# Patient Record
Sex: MALE | Race: WHITE | NOT HISPANIC OR LATINO | Employment: OTHER | URBAN - NONMETROPOLITAN AREA
[De-identification: names, ages, dates, MRNs, and addresses within clinical notes are randomized per-mention and may not be internally consistent; named-entity substitution may affect disease eponyms.]

---

## 2017-06-11 ENCOUNTER — HOSPITAL ENCOUNTER (EMERGENCY)
Facility: HOSPITAL | Age: 62
Discharge: HOME | End: 2017-06-11
Attending: NURSE PRACTITIONER | Admitting: NURSE PRACTITIONER
Payer: MEDICARE

## 2017-06-11 VITALS
SYSTOLIC BLOOD PRESSURE: 110 MMHG | BODY MASS INDEX: 28.48 KG/M2 | OXYGEN SATURATION: 96 % | WEIGHT: 210 LBS | TEMPERATURE: 97.5 F | DIASTOLIC BLOOD PRESSURE: 64 MMHG

## 2017-06-11 DIAGNOSIS — M54.41 ACUTE RIGHT-SIDED LOW BACK PAIN WITH RIGHT-SIDED SCIATICA: ICD-10-CM

## 2017-06-11 LAB
ALBUMIN SERPL-MCNC: 3.7 G/DL (ref 3.4–5)
ALP SERPL-CCNC: 90 U/L (ref 40–150)
ALT SERPL W P-5'-P-CCNC: 21 U/L (ref 0–70)
ANION GAP SERPL CALCULATED.3IONS-SCNC: 6 MMOL/L (ref 3–14)
AST SERPL W P-5'-P-CCNC: 16 U/L (ref 0–45)
BASOPHILS # BLD AUTO: 0.1 10E9/L (ref 0–0.2)
BASOPHILS NFR BLD AUTO: 0.7 %
BILIRUB SERPL-MCNC: 0.4 MG/DL (ref 0.2–1.3)
BUN SERPL-MCNC: 15 MG/DL (ref 7–30)
CALCIUM SERPL-MCNC: 9.3 MG/DL (ref 8.5–10.1)
CHLORIDE SERPL-SCNC: 107 MMOL/L (ref 94–109)
CO2 SERPL-SCNC: 29 MMOL/L (ref 20–32)
CREAT SERPL-MCNC: 0.85 MG/DL (ref 0.66–1.25)
CRP SERPL-MCNC: 5.4 MG/L (ref 0–8)
DIFFERENTIAL METHOD BLD: ABNORMAL
EOSINOPHIL # BLD AUTO: 0.2 10E9/L (ref 0–0.7)
EOSINOPHIL NFR BLD AUTO: 1.5 %
ERYTHROCYTE [DISTWIDTH] IN BLOOD BY AUTOMATED COUNT: 14.4 % (ref 10–15)
GFR SERPL CREATININE-BSD FRML MDRD: ABNORMAL ML/MIN/1.7M2
GLUCOSE SERPL-MCNC: 133 MG/DL (ref 70–99)
HCT VFR BLD AUTO: 45 % (ref 40–53)
HGB BLD-MCNC: 15.6 G/DL (ref 13.3–17.7)
IMM GRANULOCYTES # BLD: 0.1 10E9/L (ref 0–0.4)
IMM GRANULOCYTES NFR BLD: 0.6 %
LYMPHOCYTES # BLD AUTO: 2.9 10E9/L (ref 0.8–5.3)
LYMPHOCYTES NFR BLD AUTO: 29.3 %
MCH RBC QN AUTO: 33.9 PG (ref 26.5–33)
MCHC RBC AUTO-ENTMCNC: 34.7 G/DL (ref 31.5–36.5)
MCV RBC AUTO: 98 FL (ref 78–100)
MONOCYTES # BLD AUTO: 0.8 10E9/L (ref 0–1.3)
MONOCYTES NFR BLD AUTO: 7.7 %
NEUTROPHILS # BLD AUTO: 5.9 10E9/L (ref 1.6–8.3)
NEUTROPHILS NFR BLD AUTO: 60.2 %
NRBC # BLD AUTO: 0 10*3/UL
NRBC BLD AUTO-RTO: 0 /100
PLATELET # BLD AUTO: 192 10E9/L (ref 150–450)
POTASSIUM SERPL-SCNC: 4.4 MMOL/L (ref 3.4–5.3)
PROT SERPL-MCNC: 7.2 G/DL (ref 6.8–8.8)
RBC # BLD AUTO: 4.6 10E12/L (ref 4.4–5.9)
SODIUM SERPL-SCNC: 142 MMOL/L (ref 133–144)
WBC # BLD AUTO: 9.8 10E9/L (ref 4–11)

## 2017-06-11 PROCEDURE — 36415 COLL VENOUS BLD VENIPUNCTURE: CPT | Performed by: NURSE PRACTITIONER

## 2017-06-11 PROCEDURE — 85025 COMPLETE CBC W/AUTO DIFF WBC: CPT | Performed by: NURSE PRACTITIONER

## 2017-06-11 PROCEDURE — 72100 X-RAY EXAM L-S SPINE 2/3 VWS: CPT | Mod: TC

## 2017-06-11 PROCEDURE — 99213 OFFICE O/P EST LOW 20 MIN: CPT | Performed by: NURSE PRACTITIONER

## 2017-06-11 PROCEDURE — 86140 C-REACTIVE PROTEIN: CPT | Performed by: NURSE PRACTITIONER

## 2017-06-11 PROCEDURE — 25000128 H RX IP 250 OP 636: Performed by: NURSE PRACTITIONER

## 2017-06-11 PROCEDURE — 80053 COMPREHEN METABOLIC PANEL: CPT | Performed by: NURSE PRACTITIONER

## 2017-06-11 PROCEDURE — 96372 THER/PROPH/DIAG INJ SC/IM: CPT

## 2017-06-11 PROCEDURE — 99214 OFFICE O/P EST MOD 30 MIN: CPT | Mod: 25

## 2017-06-11 PROCEDURE — 25000131 ZZH RX MED GY IP 250 OP 636 PS 637: Performed by: NURSE PRACTITIONER

## 2017-06-11 RX ORDER — KETOROLAC TROMETHAMINE 30 MG/ML
60 INJECTION, SOLUTION INTRAMUSCULAR; INTRAVENOUS ONCE
Status: COMPLETED | OUTPATIENT
Start: 2017-06-11 | End: 2017-06-11

## 2017-06-11 RX ORDER — NAPROXEN 500 MG/1
500 TABLET ORAL 2 TIMES DAILY WITH MEALS
Qty: 30 TABLET | Refills: 0 | Status: ON HOLD | OUTPATIENT
Start: 2017-06-11 | End: 2020-12-14

## 2017-06-11 RX ORDER — DEXAMETHASONE SODIUM PHOSPHATE 10 MG/ML
10 INJECTION, SOLUTION INTRAMUSCULAR; INTRAVENOUS ONCE
Status: COMPLETED | OUTPATIENT
Start: 2017-06-11 | End: 2017-06-11

## 2017-06-11 RX ORDER — CYCLOBENZAPRINE HCL 10 MG
10 TABLET ORAL 3 TIMES DAILY PRN
Qty: 20 TABLET | Refills: 0 | Status: SHIPPED | OUTPATIENT
Start: 2017-06-11 | End: 2017-06-17

## 2017-06-11 RX ADMIN — DEXAMETHASONE SODIUM PHOSPHATE 10 MG: 10 INJECTION, SOLUTION INTRAMUSCULAR; INTRAVENOUS at 14:16

## 2017-06-11 RX ADMIN — KETOROLAC TROMETHAMINE 60 MG: 30 INJECTION, SOLUTION INTRAMUSCULAR at 13:20

## 2017-06-11 ASSESSMENT — ENCOUNTER SYMPTOMS
VOMITING: 0
FEVER: 0
FATIGUE: 0
CHILLS: 0
NECK STIFFNESS: 0
NECK PAIN: 0
NUMBNESS: 1
CONSTIPATION: 0
BACK PAIN: 1
ABDOMINAL PAIN: 0
NAUSEA: 0
DYSURIA: 0
APPETITE CHANGE: 0
DIARRHEA: 0
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
## 2017-06-11 NOTE — DISCHARGE INSTRUCTIONS
Back Care Tips    Caring for your back  These are things you can do to prevent a recurrence of acute back pain and to reduce symptoms from chronic back pain:    Maintain a healthy weight. If you are overweight, losing weight will help most types of back pain.    Exercise is an important part of recovery from most types of back pain. The back is supported by the muscles behind and in front of the spine. This means both the back muscles and the abdominal muscles must be strengthened to provide better support for your spine.     Swimming and brisk walking are good overall exercises to improve your fitness level.    Practice safe lifting methods (below).    Practice good posture when sitting, standing and walking. Avoid prolonged sitting. This puts more stress on the lower back than standing or walking.    Wear quality shoes with sufficient arch support. Foot and ankle alignment can affect back symptoms. Women should avoid high heels.    Therapeutic massage can help  relax the back muscles without stretching them.    During the first 24 to 72 hours after an acute injury or flare-up of chronic back pain, apply an ice pack to the painful area for 20 minutes and then remove it for 20 minutes over a period of 60 to 90 minutes or several times a day. As a safety precaution, do not use a heating pad at bedtime. Sleeping on a heating pad can lead to skin burns or tissue damage.    Ice and heat therapies can be alternated.  Medications  Talk to your doctor before using medications, especially if you have other medical problems or are taking other medicines.    You may use acetaminophen or ibuprofen to control pain, unless other pain medicine was prescribed. If you have chronic conditions like diabetes, liver or kidney disease, stomach ulcers or gastrointestinal bleeding, or are taking blood thinners, talk with your doctor before taking any meidcations.    Be careful if you are given prescription pain medicines, narcotics, or  medication for muscle spasm. They can cause drowsiness, affect your coordination, reflexes and judgment. Do not drive or operate heavy machinery.  Lumbar stretch  Here is a simple stretching exercise that will help relax muscle spasm and keep your back more limber. If exercise makes your back pain worse, don t do it.    Lie on your back with your knees bent and both feet on the ground.    Slowly raise your left knee to your chest as you flatten your lower back against the floor. Hold for 5 seconds.    Relax and repeat the exercise with your right knee.    Do 10 of these exercises for each leg.  Safe lifting method    Don t bend over at the waist to lift an object off the floor.  Instead, bend your knees and hips in a squat.     Keep your back and head upright    Hold the object close to your body, directly in front of you.    Straighten your legs to lift the object.     Lower the object to the floor in the reverse fashion.    If you must slide something across the floor, push it.  Posture tips  Sitting  Sit in chairs with straight backs or low-back support. rKeep your k nees lower than your hip, with your feet flat on the floor.  When driving, sit up straight. Adjust the seat forward so you are not leaning toward the steering wheel.  A small pillow or rolled towel behind your lower back may help if you are driving long distances.   Standing  When standing for long periods, shift most of your weight to one leg at a time. Alternate legs every few minutes.   Sleeping  The best way to sleep is on your side with your knees bent. Put a low pillow under your head to support your neck in a neutral spine position. Avoid thick pillows that bend your neck to one side. Put a pillow between your legs to further relax your lower back. If you sleep on your back, put pillows under your knees to support your legs in a slightly flexed position. Use a firm mattress. If your mattress sags, replace it, or use a 1/2-inch plywood board  under the mattress to add support.  Follow-up care  Follow up with your health care provider or as directed by our staff.  If X-rays, a CT scan or an MRI scan were taken, they will be reviewed by a radiologist. You will be notified of any new findings that may affect your care.  Call 911  Seek emergency medical care if any of the following occur:    Trouble breathing    Confusion    Very drowsy or trouble breathing    Fainting or loss of consciousness    Rapid or very slow heart rate    Loss of  bwel or bladder control  When to seek medical care  Call your health care provider if any of the following occur:    Pain becomes worse or spreads to your arms or legs    Weakness or numbness in one or both arms or legs    Numbness in the groin area    3827-6931 [employer]. [address] [place] [address]. All rights reserved. This information is not intended as a substitute for professional medical care. Always follow your healthcare professional's instructions.          Possible Causes of Low Back or Leg Pain    The symptoms in your back or leg may be due to pressure on a nerve. This pressure may be caused by a damaged disk or by abnormal bone growth. Either way, you may feel pain, burning, tingling, or numbness. If you have pressure on a nerve that connects to the sciatic nerve, pain may shoot down your leg.    Pressure from the disk  Constant wear and tear can weaken a disk over time and cause back pain. The disk can then be damaged by a sudden movement or injury. If its soft center begins to bulge, the disk may press on a nerve. Or the outside of the disk may tear, and the soft center may squeeze through and pinch a nerve.    Pressure from bone  As a disk wears out, the vertebrae right above and below the disk begin to touch. This can put pressure on a nerve. Often, abnormal bone (called bone spurs) grows where the vertebrae rub against each other. This can cause the foramen or the spinal canal  to narrow (called stenosis) and press against a nerve.    9074-1582 [employer]. [address] [place] [address], [place] [address]. All rights reserved. This information is not intended as a substitute for professional medical care. Always follow your healthcare professional's instructions.

## 2017-06-11 NOTE — ED AVS SNAPSHOT
[facility]    [address] [address]    Phone:  [phone]                                       Cuco Cooper   MRN: [id number]    Department:  [facility]   Date of Visit:  6/11/2017           After Visit Summary Signature Page     I have received my discharge instructions, and my questions have been answered. I have discussed any challenges I see with this plan with the nurse or doctor.    ..........................................................................................................................................  Patient/Patient Representative Signature      ..........................................................................................................................................  Patient Representative Print Name and Relationship to Patient    ..................................................               ................................................  Date                                            Time    ..........................................................................................................................................  Reviewed by Signature/Title    ...................................................              ..............................................  Date                                                            Time
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
## 2017-06-11 NOTE — ED AVS SNAPSHOT
[facility]    [address] [address]    Phone:  [phone]                                       Cuco Cooper   MRN: [id number]    Department:  [facility]   Date of Visit:  6/11/2017           Patient Information     Date Of Birth          1955        Your diagnoses for this visit were:     Acute right-sided low back pain with right-sided sciatica        You were seen by Mandy Mcfarland NP.      Follow-up Information     Follow up with [facility].    Specialty:  EMERGENCY MEDICINE    Why:  As needed, If symptoms worsen, or concerns develop    Contact information:    [address] [address] [phone]    Additional information:    From [facility]: Take [address]. Turn left at [address]. Turn left at the first stoplight on [address]. At the first stop sign, take a right onto [address]. Take a left into the parking lot and continue through until you reach the North enterance of the building.       From [place]: Take [address]. Take the [address] ramp towards [place]. Turn left onto [address]. Take a slight right onto [address] [facility]. Turn left at the first stoplight on [address]. At the first stop sign, take a right onto [address]. Take a left into the parking lot and continue through until you reach the North enterance of the building.       From [place]: Take [address]. Take a right at [address]. At the first stop sign, take a right onto [address]. Take a left into the parking lot and continue through until you reach the North enterance of the building.         Follow up with Alexi Unger MD. Call on 6/12/2017.    Specialty:  Family Practice    Why:  to schedule an appointment for follow up to discuss Physical therapy    Contact information:    [facility]  [address]  [address]  [phone]          Discharge Instructions         Back Care Tips    Caring for your back  These are things you can do to prevent a recurrence of acute back pain and to reduce symptoms from chronic back pain:    Maintain a healthy weight. If you are overweight, losing weight will help most types of back pain.    Exercise is an important part of recovery from most types of back pain. The back is supported by the muscles behind and in front of the spine. This means both the back muscles and the abdominal muscles must be strengthened to provide better support for your spine.     Swimming and brisk walking are good overall exercises to improve your fitness level.    Practice safe lifting methods (below).    Practice good posture when sitting, standing and walking. Avoid prolonged sitting. This puts more stress on the lower back than standing or walking.    Wear quality shoes with sufficient arch support. Foot and ankle alignment can affect back symptoms. Women should avoid high heels.    Therapeutic massage can help  relax the back muscles without stretching them.    During the first 24 to 72 hours after an acute injury or flare-up of chronic back pain, apply an ice pack to the painful area for 20 minutes and then remove it for 20 minutes over a period of 60 to 90 minutes or several times a day. As a safety precaution, do not use a heating pad at bedtime. Sleeping on a heating pad can lead to skin burns or tissue damage.    Ice and heat therapies can be alternated.  Medications  Talk to your doctor before using medications, especially if you have other medical problems or are taking other medicines.    You may use acetaminophen or ibuprofen to control pain, unless other pain medicine was prescribed. If you have chronic conditions like diabetes, liver or kidney disease, stomach ulcers or gastrointestinal bleeding, or are taking blood thinners, talk with your doctor before taking any meidcations.    Be careful if  you are given prescription pain medicines, narcotics, or medication for muscle spasm. They can cause drowsiness, affect your coordination, reflexes and judgment. Do not drive or operate heavy machinery.  Lumbar stretch  Here is a simple stretching exercise that will help relax muscle spasm and keep your back more limber. If exercise makes your back pain worse, don t do it.    Lie on your back with your knees bent and both feet on the ground.    Slowly raise your left knee to your chest as you flatten your lower back against the floor. Hold for 5 seconds.    Relax and repeat the exercise with your right knee.    Do 10 of these exercises for each leg.  Safe lifting method    Don t bend over at the waist to lift an object off the floor.  Instead, bend your knees and hips in a squat.     Keep your back and head upright    Hold the object close to your body, directly in front of you.    Straighten your legs to lift the object.     Lower the object to the floor in the reverse fashion.    If you must slide something across the floor, push it.  Posture tips  Sitting  Sit in chairs with straight backs or low-back support. rKeep your k nees lower than your hip, with your feet flat on the floor.  When driving, sit up straight. Adjust the seat forward so you are not leaning toward the steering wheel.  A small pillow or rolled towel behind your lower back may help if you are driving long distances.   Standing  When standing for long periods, shift most of your weight to one leg at a time. Alternate legs every few minutes.   Sleeping  The best way to sleep is on your side with your knees bent. Put a low pillow under your head to support your neck in a neutral spine position. Avoid thick pillows that bend your neck to one side. Put a pillow between your legs to further relax your lower back. If you sleep on your back, put pillows under your knees to support your legs in a slightly flexed position. Use a firm mattress. If your  mattress sags, replace it, or use a 1/2-inch plywood board under the mattress to add support.  Follow-up care  Follow up with your health care provider or as directed by our staff.  If X-rays, a CT scan or an MRI scan were taken, they will be reviewed by a radiologist. You will be notified of any new findings that may affect your care.  Call 911  Seek emergency medical care if any of the following occur:    Trouble breathing    Confusion    Very drowsy or trouble breathing    Fainting or loss of consciousness    Rapid or very slow heart rate    Loss of  bwel or bladder control  When to seek medical care  Call your health care provider if any of the following occur:    Pain becomes worse or spreads to your arms or legs    Weakness or numbness in one or both arms or legs    Numbness in the groin area    3246-0848 [employer]. [address] [place], [place] [id number]. All rights reserved. This information is not intended as a substitute for professional medical care. Always follow your healthcare professional's instructions.          Possible Causes of Low Back or Leg Pain    The symptoms in your back or leg may be due to pressure on a nerve. This pressure may be caused by a damaged disk or by abnormal bone growth. Either way, you may feel pain, burning, tingling, or numbness. If you have pressure on a nerve that connects to the sciatic nerve, pain may shoot down your leg.    Pressure from the disk  Constant wear and tear can weaken a disk over time and cause back pain. The disk can then be damaged by a sudden movement or injury. If its soft center begins to bulge, the disk may press on a nerve. Or the outside of the disk may tear, and the soft center may squeeze through and pinch a nerve.    Pressure from bone  As a disk wears out, the vertebrae right above and below the disk begin to touch. This can put pressure on a nerve. Often, abnormal bone (called bone spurs) grows where the vertebrae rub against  each other. This can cause the foramen or the spinal canal to narrow (called stenosis) and press against a nerve.    7901-9902 [employer]. [address] [place] [address], [place] [address]. All rights reserved. This information is not intended as a substitute for professional medical care. Always follow your healthcare professional's instructions.             Review of your medicines      START taking        Dose / Directions Last dose taken    cyclobenzaprine 10 MG tablet   Commonly known as:  FLEXERIL   Dose:  10 mg   Quantity:  20 tablet        Take 1 tablet (10 mg) by mouth 3 times daily as needed for muscle spasms   Refills:  0        naproxen 500 MG tablet   Commonly known as:  NAPROSYN   Dose:  500 mg   Quantity:  30 tablet        Take 1 tablet (500 mg) by mouth 2 times daily (with meals)   Refills:  0          Our records show that you are taking the medicines listed below. If these are incorrect, please call your family doctor or clinic.        Dose / Directions Last dose taken    ABILIFY PO   Dose:  5 mg        Take 5 mg by mouth daily   Refills:  0        ACTOS PO   Dose:  15 mg        Take 15 mg by mouth daily.   Refills:  0        CELEXA PO   Dose:  60 mg        Take 60 mg by mouth daily.   Refills:  0        DEPAKOTE ER PO   Dose:  1000 mg        Take 1,000 mg by mouth Last rx filled in July 2105   Refills:  0        FOLIC ACID PO   Dose:  1 mg        Take 1 mg by mouth daily   Refills:  0        FUROSEMIDE PO   Dose:  20 mg        Take 20 mg by mouth daily   Refills:  0        KEPPRA PO   Dose:  500 mg        Take 500 mg by mouth 2 times daily   Refills:  0        lisinopril 20 MG tablet   Commonly known as:  PRINIVIL/ZESTRIL   Dose:  20 mg   Quantity:  90 tablet        Take 1 tablet by mouth daily.   Refills:  3        OMEPRAZOLE PO   Dose:  20 mg        Take 20 mg by mouth every morning   Refills:  0        SEROQUEL PO   Dose:  100 mg        Take 100 mg by mouth At Bedtime.   Refills:  0      "   sucralfate 1 GM tablet   Commonly known as:  CARAFATE   Dose:  1 g        Take 1 g by mouth 4 times daily   Refills:  0        ZOCOR PO   Dose:  20 mg        Take 20 mg by mouth every evening   Refills:  0          STOP taking        Dose Reason for stopping Comments    PREDNISONE PO                      Prescriptions were sent or printed at these locations (2 Prescriptions)                   [facility] [address]   [address] [address]    Telephone:  [phone]   Fax:  [phone]   Hours:                  E-Prescribed (2 of 2)         naproxen (NAPROSYN) 500 MG tablet               cyclobenzaprine (FLEXERIL) 10 MG tablet                Procedures and tests performed during your visit     CBC with platelets differential    CRP inflammation    Comprehensive metabolic panel    Lumbar spine XR, 2-3 views      Orders Needing Specimen Collection     None      Pending Results     Date and Time Order Name Status Description    6/11/2017 1320 Lumbar spine XR, 2-3 views Preliminary             Pending Culture Results     No orders found from 6/9/2017 to 6/12/2017.            Thank you for choosing [facility]       Thank you for choosing [facility] for your care. Our goal is always to provide you with excellent care. Hearing back from our patients is one way we can continue to improve our services. Please take a few minutes to complete the written survey that you may receive in the mail after you visit with us. Thank you!        [employer] Information     [employer] lets you send messages to your doctor, view your test results, renew your prescriptions, schedule appointments and more. To sign up, go to [email or web address] . Click on \"Log in\" on the left side of the screen, which will take you to the Welcome page. Then click on \"Sign up Now\" on the right side of the page.     You will be asked to enter the access code listed below, as well as some personal information. Please follow " the directions to create your username and password.     Your access code is: [email or web address]  Expires: 2017  3:15 PM     Your access code will  in 90 days. If you need help or a new code, please call your [facility] clinic or [phone].        Care EveryWhere ID     This is your Care EveryWhere ID. This could be used by other organizations to access your [facility] medical records  [id number]        After Visit Summary       This is your record. Keep this with you and show to your community pharmacist(s) and doctor(s) at your next visit.

## 2017-06-13 NOTE — PROGRESS NOTES
Lumbar Spine XR report routed to PCP, Dr. ARABELLA Unger at [facility].   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

## 2017-06-18 ENCOUNTER — HOSPITAL ENCOUNTER (EMERGENCY)
Facility: HOSPITAL | Age: 62
Discharge: HOME | End: 2017-06-18
Attending: NURSE PRACTITIONER | Admitting: NURSE PRACTITIONER
Payer: MEDICARE

## 2017-06-18 VITALS — OXYGEN SATURATION: 98 % | SYSTOLIC BLOOD PRESSURE: 122 MMHG | DIASTOLIC BLOOD PRESSURE: 77 MMHG | TEMPERATURE: 97.8 F

## 2017-06-18 DIAGNOSIS — M54.50 ACUTE BILATERAL LOW BACK PAIN WITHOUT SCIATICA: ICD-10-CM

## 2017-06-18 PROCEDURE — 99213 OFFICE O/P EST LOW 20 MIN: CPT | Performed by: NURSE PRACTITIONER

## 2017-06-18 PROCEDURE — 99211 OFF/OP EST MAY X REQ PHY/QHP: CPT

## 2017-06-18 RX ORDER — CYCLOBENZAPRINE HCL 10 MG
10 TABLET ORAL 3 TIMES DAILY PRN
Qty: 20 TABLET | Refills: 0 | Status: SHIPPED | OUTPATIENT
Start: 2017-06-18 | End: 2017-06-24

## 2017-06-18 NOTE — DISCHARGE INSTRUCTIONS
Exercises to Strengthen Your Lower Back  Strong lower back and abdominal muscles work together to support your spine. The exercises below will help strengthen the lower back. It is important that you begin exercising slowly and increase levels gradually.  Always begin any exercise program with stretching. If you feel pain while doing any of these exercises, stop and talk to your doctor about a more specific exercise program that better suits your condition.   Low back stretch  The point of stretching is to make you more flexible and increase your range of motion. Stretch only as much as you are able. Stretch slowly. Do not push your stretch to the limit. If at any point you feel pain while stretching, this is your (temporary) limit.    Lie on your back with your knees bent and both feet on the ground.    Slowly raise your left knee to your chest as you flatten your lower back against the floor. Hold for 5 seconds.    Relax and repeat the exercise with your right knee.    Do 10 of these exercises for each leg.    Repeat hugging both knees to your chest at the same time.  Building lower back strength  Start your exercise routine with 10 to 30 minutes a day, 1 to 3 times a day.  Initial exercises  Lying on your back:  1. Ankle pumps: Move your foot up and down, towards your head, and then away. Repeat 10 times with each foot.  2. Heel slides: Slowly bend your knee, drawing the heel of your foot towards you. Then slide your heel/foot from you, straightening your knee. Do not lift your foot off the floor (this is not a leg lift).  3. Abdominal contraction: Bend your knees and put your hands on your stomach. Tighten your stomach muscles. Hold for 5 seconds, then relax. Repeat 10 times.  4. Straight leg raise: Bend one leg at the knee and keep the other leg straight. Tighten your stomach muscles. Slowly lift your straight leg 6 to 12 inches off the floor and hold for up to 5 seconds. Repeat 10 times on each  side.  Standin. Wall squats: Stand with your back against the wall. Move your feet about 12 inches away from the wall. Tighten your stomach muscles, and slowly bend your knees until they are at about a 45 degree angle. Do not go down too far. Hold about 5 seconds. Then slowly return to your starting position. Repeat 10 times.  2. Heel raises: Stand facing the wall. Slowly raise the heels of your feet up and down, while keeping your toes on the floor. If you have trouble balancing, you can touch the wall with your hands. Repeat 10 times.  More advanced exercises  When you feel comfortable enough, try these exercises.  1. Kneeling lumbar extension: Begin on your hands and knees. At the same time, raise and straighten your right arm and left leg until they are parallel to the ground. Hold for 2 seconds and come back slowly to a starting position. Repeat with left arm and right leg, alternating 10 times.  2. Prone lumbar extension: Lie face down, arms extended overhead, palms on the floor. At the same time, raise your right arm and left leg as high as comfortably possible. Hold for 10 seconds and slowly return to start. Repeat with left arm and right leg, alternating 10 times. Gradually build up to 20 times. (Advanced: Repeat this exercise raising both arms and both legs a few inches off the floor at the same time. Hold for 5 seconds and release.)  3. Pelvic tilt: Lie on the floor on your back with your knees bent at 90 degrees. Your feet should be flat on the floor. Inhale, exhale, then slowly contract your abdominal muscles bringing your navel toward your spine. Let your pelvis rock back until your lower back is flat on the floor. Hold for 10 seconds while breathing smoothly.  4. Abdominal crunch: Perform a pelvic tilt (above) flattening your lower back against the floor. Holding the tension in your abdominal muscles, take another breath and raise your shoulder blades off the ground (this is not a full sit-up).  Keep your head in line with your body (don t bend your neck forward). Hold for 2 seconds, then slowly lower.  Date Last Reviewed: 6/1/2016 2000-2017 [employer]. [address] [place] [address]. All rights reserved. This information is not intended as a substitute for professional medical care. Always follow your healthcare professional's instructions.          Possible Causes of Low Back or Leg Pain    The symptoms in your back or leg may be due to pressure on a nerve. This pressure may be caused by a damaged disk or by abnormal bone growth. Either way, you may feel pain, burning, tingling, or numbness. If you have pressure on a nerve that connects to the sciatic nerve, pain may shoot down your leg.    Pressure from the disk  Constant wear and tear can weaken a disk over time and cause back pain. The disk can then be damaged by a sudden movement or injury. If its soft center begins to bulge, the disk may press on a nerve. Or the outside of the disk may tear, and the soft center may squeeze through and pinch a nerve.    Pressure from bone  As a disk wears out, the vertebrae right above and below the disk begin to touch. This can put pressure on a nerve. Often, abnormal bone (called bone spurs) grows where the vertebrae rub against each other. This can cause the foramen or the spinal canal to narrow (called stenosis) and press against a nerve.  Date Last Reviewed: 10/4/2015    1618-5283 [employer]. [address] [place] [address]. All rights reserved. This information is not intended as a substitute for professional medical care. Always follow your healthcare professional's instructions.

## 2017-06-18 NOTE — ED AVS SNAPSHOT
[facility]    [address] [address]    Phone:  [phone]                                       Cuco Cooper   MRN: [id number]    Department:  [facility]   Date of Visit:  6/18/2017           After Visit Summary Signature Page     I have received my discharge instructions, and my questions have been answered. I have discussed any challenges I see with this plan with the nurse or doctor.    ..........................................................................................................................................  Patient/Patient Representative Signature      ..........................................................................................................................................  Patient Representative Print Name and Relationship to Patient    ..................................................               ................................................  Date                                            Time    ..........................................................................................................................................  Reviewed by Signature/Title    ...................................................              ..............................................  Date                                                            Time

## 2017-06-18 NOTE — ED AVS SNAPSHOT
[facility]    [address] [address]    Phone:  [phone]                                       Cuco Cooper   MRN: [id number]    Department:  [facility]   Date of Visit:  6/18/2017           Patient Information     Date Of Birth          1955        Your diagnoses for this visit were:     Acute bilateral low back pain without sciatica        You were seen by Krystin Bustos NP.      Follow-up Information     Follow up with Alexi Unger MD In 1 day.    Specialty:  Family Practice    Contact information:    [facility]  [address] [address] [phone]          Follow up with [facility].    Specialty:  EMERGENCY MEDICINE    Why:  As needed, If symptoms worsen    Contact information:    [address] [address] [phone]    Additional information:    From [facility]: Take [address]. Turn left at [address]. Turn left at the first stoplight on [address]. At the first stop sign, take a right onto [address]. Take a left into the parking lot and continue through until you reach the North enterance of the building.       From [place]: Take [address]. Take the [address] ramp towards [place]. Turn left onto [address]. Take a slight right onto [address]. Turn left at the first stoplight on [address]. At the first stop sign, take a right onto [address]. Take a left into the parking lot and continue through until you reach the North enterance of the building.       From [place]: Take [address]. Take a right at [address]. At the first stop sign, take a right onto [address]. Take a left into the parking lot and continue through until you reach the North enterance of the building.         Discharge Instructions         Exercises to Strengthen Your Lower Back  Strong lower back and abdominal muscles work  together to support your spine. The exercises below will help strengthen the lower back. It is important that you begin exercising slowly and increase levels gradually.  Always begin any exercise program with stretching. If you feel pain while doing any of these exercises, stop and talk to your doctor about a more specific exercise program that better suits your condition.   Low back stretch  The point of stretching is to make you more flexible and increase your range of motion. Stretch only as much as you are able. Stretch slowly. Do not push your stretch to the limit. If at any point you feel pain while stretching, this is your (temporary) limit.    Lie on your back with your knees bent and both feet on the ground.    Slowly raise your left knee to your chest as you flatten your lower back against the floor. Hold for 5 seconds.    Relax and repeat the exercise with your right knee.    Do 10 of these exercises for each leg.    Repeat hugging both knees to your chest at the same time.  Building lower back strength  Start your exercise routine with 10 to 30 minutes a day, 1 to 3 times a day.  Initial exercises  Lying on your back:  1. Ankle pumps: Move your foot up and down, towards your head, and then away. Repeat 10 times with each foot.  2. Heel slides: Slowly bend your knee, drawing the heel of your foot towards you. Then slide your heel/foot from you, straightening your knee. Do not lift your foot off the floor (this is not a leg lift).  3. Abdominal contraction: Bend your knees and put your hands on your stomach. Tighten your stomach muscles. Hold for 5 seconds, then relax. Repeat 10 times.  4. Straight leg raise: Bend one leg at the knee and keep the other leg straight. Tighten your stomach muscles. Slowly lift your straight leg 6 to 12 inches off the floor and hold for up to 5 seconds. Repeat 10 times on each side.  Standin. Wall squats: Stand with your back against the wall. Move your feet about 12  inches away from the wall. Tighten your stomach muscles, and slowly bend your knees until they are at about a 45 degree angle. Do not go down too far. Hold about 5 seconds. Then slowly return to your starting position. Repeat 10 times.  2. Heel raises: Stand facing the wall. Slowly raise the heels of your feet up and down, while keeping your toes on the floor. If you have trouble balancing, you can touch the wall with your hands. Repeat 10 times.  More advanced exercises  When you feel comfortable enough, try these exercises.  1. Kneeling lumbar extension: Begin on your hands and knees. At the same time, raise and straighten your right arm and left leg until they are parallel to the ground. Hold for 2 seconds and come back slowly to a starting position. Repeat with left arm and right leg, alternating 10 times.  2. Prone lumbar extension: Lie face down, arms extended overhead, palms on the floor. At the same time, raise your right arm and left leg as high as comfortably possible. Hold for 10 seconds and slowly return to start. Repeat with left arm and right leg, alternating 10 times. Gradually build up to 20 times. (Advanced: Repeat this exercise raising both arms and both legs a few inches off the floor at the same time. Hold for 5 seconds and release.)  3. Pelvic tilt: Lie on the floor on your back with your knees bent at 90 degrees. Your feet should be flat on the floor. Inhale, exhale, then slowly contract your abdominal muscles bringing your navel toward your spine. Let your pelvis rock back until your lower back is flat on the floor. Hold for 10 seconds while breathing smoothly.  4. Abdominal crunch: Perform a pelvic tilt (above) flattening your lower back against the floor. Holding the tension in your abdominal muscles, take another breath and raise your shoulder blades off the ground (this is not a full sit-up). Keep your head in line with your body (don t bend your neck forward). Hold for 2 seconds, then  slowly lower.  Date Last Reviewed: 6/1/2016 2000-2017 [employer]. [address] [place] [address]277. All rights reserved. This information is not intended as a substitute for professional medical care. Always follow your healthcare professional's instructions.          Possible Causes of Low Back or Leg Pain    The symptoms in your back or leg may be due to pressure on a nerve. This pressure may be caused by a damaged disk or by abnormal bone growth. Either way, you may feel pain, burning, tingling, or numbness. If you have pressure on a nerve that connects to the sciatic nerve, pain may shoot down your leg.    Pressure from the disk  Constant wear and tear can weaken a disk over time and cause back pain. The disk can then be damaged by a sudden movement or injury. If its soft center begins to bulge, the disk may press on a nerve. Or the outside of the disk may tear, and the soft center may squeeze through and pinch a nerve.    Pressure from bone  As a disk wears out, the vertebrae right above and below the disk begin to touch. This can put pressure on a nerve. Often, abnormal bone (called bone spurs) grows where the vertebrae rub against each other. This can cause the foramen or the spinal canal to narrow (called stenosis) and press against a nerve.  Date Last Reviewed: 10/4/2015    6670-2739 [employer]. [address] [place] [address]41. All rights reserved. This information is not intended as a substitute for professional medical care. Always follow your healthcare professional's instructions.             Review of your medicines      START taking        Dose / Directions Last dose taken    * cyclobenzaprine 10 MG tablet   Commonly known as:  FLEXERIL   Dose:  10 mg   Quantity:  20 tablet        Take 1 tablet (10 mg) by mouth 3 times daily as needed for muscle spasms   Refills:  0        * Notice:  This list has 1 medication(s) that are the same as other  medications prescribed for you. Read the directions carefully, and ask your doctor or other care provider to review them with you.      Our records show that you are taking the medicines listed below. If these are incorrect, please call your family doctor or clinic.        Dose / Directions Last dose taken    ABILIFY PO   Dose:  5 mg        Take 5 mg by mouth daily   Refills:  0        ACTOS PO   Dose:  15 mg        Take 15 mg by mouth daily.   Refills:  0        CELEXA PO   Dose:  60 mg        Take 60 mg by mouth daily.   Refills:  0        DEPAKOTE ER PO   Dose:  1000 mg        Take 1,000 mg by mouth Last rx filled in July 2105   Refills:  0        FOLIC ACID PO   Dose:  1 mg        Take 1 mg by mouth daily   Refills:  0        FUROSEMIDE PO   Dose:  20 mg        Take 20 mg by mouth daily   Refills:  0        KEPPRA PO   Dose:  500 mg        Take 500 mg by mouth 2 times daily   Refills:  0        lisinopril 20 MG tablet   Commonly known as:  PRINIVIL/ZESTRIL   Dose:  20 mg   Quantity:  90 tablet        Take 1 tablet by mouth daily.   Refills:  3        naproxen 500 MG tablet   Commonly known as:  NAPROSYN   Dose:  500 mg   Quantity:  30 tablet        Take 1 tablet (500 mg) by mouth 2 times daily (with meals)   Refills:  0        OMEPRAZOLE PO   Dose:  20 mg        Take 20 mg by mouth every morning   Refills:  0        SEROQUEL PO   Dose:  100 mg        Take 100 mg by mouth At Bedtime.   Refills:  0        sucralfate 1 GM tablet   Commonly known as:  CARAFATE   Dose:  1 g        Take 1 g by mouth 4 times daily   Refills:  0        ZOCOR PO   Dose:  20 mg        Take 20 mg by mouth every evening   Refills:  0          ASK your doctor about these medications        Dose / Directions Last dose taken    * cyclobenzaprine 10 MG tablet   Commonly known as:  FLEXERIL   Dose:  10 mg   Quantity:  20 tablet   Ask about: Should I take this medication?        Take 1 tablet (10 mg) by mouth 3 times daily as needed for muscle  "spasms   Refills:  0        * Notice:  This list has 1 medication(s) that are the same as other medications prescribed for you. Read the directions carefully, and ask your doctor or other care provider to review them with you.            Prescriptions were sent or printed at these locations (1 Prescription)                   [facility] [address] [id number]    [address] [place] [address]    Telephone:  [phone]   Fax:  [phone]   Hours:                  E-Prescribed (1 of 1)         cyclobenzaprine (FLEXERIL) 10 MG tablet                Orders Needing Specimen Collection     None      Pending Results     No orders found from 2017 to 2017.            Pending Culture Results     No orders found from 2017 to 2017.            Thank you for choosing [facility]       Thank you for choosing [facility] for your care. Our goal is always to provide you with excellent care. Hearing back from our patients is one way we can continue to improve our services. Please take a few minutes to complete the written survey that you may receive in the mail after you visit with us. Thank you!        [employer] Information     [employer] lets you send messages to your doctor, view your test results, renew your prescriptions, schedule appointments and more. To sign up, go to [email or web address] . Click on \"Log in\" on the left side of the screen, which will take you to the Welcome page. Then click on \"Sign up Now\" on the right side of the page.     You will be asked to enter the access code listed below, as well as some personal information. Please follow the directions to create your username and password.     Your access code is: [email or web address]  Expires: 2017  3:15 PM     Your access code will  in 90 days. If you need help or a new code, please call your [facility] clinic or [phone].        Care EveryWhere ID     This is your Care EveryWhere ID. This could be used by other organizations " to access your [place] medical records  [id number]        After Visit Summary       This is your record. Keep this with you and show to your community pharmacist(s) and doctor(s) at your next visit.
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
## 2017-06-18 NOTE — ED NOTES
Patient presents with muscle spasm in lower back.  Patient is seeing primary on Monday at 1145.  Patient was seen in urgent care for this problem on the 11th of this month.

## 2017-06-22 ASSESSMENT — ENCOUNTER SYMPTOMS
BACK PAIN: 1
NUMBNESS: 0
FEVER: 0
WEAKNESS: 0
GASTROINTESTINAL NEGATIVE: 1
RESPIRATORY NEGATIVE: 1
CONSTITUTIONAL NEGATIVE: 1
CONSTIPATION: 0
EYES NEGATIVE: 1
NEUROLOGICAL NEGATIVE: 1
MYALGIAS: 1
CARDIOVASCULAR NEGATIVE: 1
COUGH: 0

## 2019-06-14 ENCOUNTER — HOSPITAL ENCOUNTER (EMERGENCY)
Facility: HOSPITAL | Age: 64
Discharge: HOME | End: 2019-06-14
Attending: NURSE PRACTITIONER | Admitting: NURSE PRACTITIONER
Payer: MEDICARE

## 2019-06-14 ENCOUNTER — APPOINTMENT (OUTPATIENT)
Dept: GENERAL RADIOLOGY | Facility: HOSPITAL | Age: 64
End: 2019-06-14
Attending: NURSE PRACTITIONER
Payer: MEDICARE

## 2019-06-14 VITALS
SYSTOLIC BLOOD PRESSURE: 121 MMHG | RESPIRATION RATE: 18 BRPM | TEMPERATURE: 99 F | OXYGEN SATURATION: 93 % | WEIGHT: 210 LBS | BODY MASS INDEX: 28.48 KG/M2 | DIASTOLIC BLOOD PRESSURE: 77 MMHG

## 2019-06-14 DIAGNOSIS — Z29.14 ENCOUNTER FOR PROPHYLACTIC ADMINISTRATION OF RABIES IMMUNE GLOBULIN: ICD-10-CM
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
## 2019-06-14 DIAGNOSIS — S61.412A LACERATION OF LEFT HAND WITHOUT FOREIGN BODY, INITIAL ENCOUNTER: ICD-10-CM

## 2019-06-14 DIAGNOSIS — S61.452A DOG BITE, HAND, LEFT, INITIAL ENCOUNTER: ICD-10-CM

## 2019-06-14 PROCEDURE — 73130 X-RAY EXAM OF HAND: CPT | Mod: TC,LT

## 2019-06-14 PROCEDURE — G0463 HOSPITAL OUTPT CLINIC VISIT: HCPCS | Mod: 25

## 2019-06-14 PROCEDURE — 12042 INTMD RPR N-HF/GENIT2.6-7.5: CPT

## 2019-06-14 PROCEDURE — 96372 THER/PROPH/DIAG INJ SC/IM: CPT

## 2019-06-14 PROCEDURE — 25000132 ZZH RX MED GY IP 250 OP 250 PS 637: Mod: GY | Performed by: NURSE PRACTITIONER

## 2019-06-14 PROCEDURE — 90471 IMMUNIZATION ADMIN: CPT

## 2019-06-14 PROCEDURE — A9270 NON-COVERED ITEM OR SERVICE: HCPCS | Mod: GY | Performed by: NURSE PRACTITIONER

## 2019-06-14 PROCEDURE — 25000128 H RX IP 250 OP 636: Performed by: NURSE PRACTITIONER

## 2019-06-14 PROCEDURE — 90675 RABIES VACCINE IM: CPT | Performed by: NURSE PRACTITIONER

## 2019-06-14 PROCEDURE — 25000128 H RX IP 250 OP 636

## 2019-06-14 PROCEDURE — 12042 INTMD RPR N-HF/GENIT2.6-7.5: CPT | Performed by: NURSE PRACTITIONER

## 2019-06-14 PROCEDURE — 99213 OFFICE O/P EST LOW 20 MIN: CPT | Mod: 25 | Performed by: NURSE PRACTITIONER

## 2019-06-14 PROCEDURE — 90375 RABIES IG IM/SC: CPT

## 2019-06-14 RX ORDER — HYDROCODONE BITARTRATE AND ACETAMINOPHEN 5; 325 MG/1; MG/1
1 TABLET ORAL EVERY 4 HOURS PRN
Qty: 6 TABLET | Refills: 0 | Status: ON HOLD | OUTPATIENT
Start: 2019-06-14 | End: 2020-12-14

## 2019-06-14 RX ORDER — AMOXICILLIN AND CLAVULANATE POTASSIUM 875; 125 MG/1; MG/1
1 TABLET, FILM COATED ORAL ONCE
Status: DISCONTINUED | OUTPATIENT
Start: 2019-06-14 | End: 2019-06-14 | Stop reason: HOSPADM

## 2019-06-14 RX ORDER — IBUPROFEN 800 MG/1
800 TABLET, FILM COATED ORAL ONCE
Status: DISCONTINUED | OUTPATIENT
Start: 2019-06-14 | End: 2019-06-14 | Stop reason: HOSPADM

## 2019-06-14 RX ORDER — IBUPROFEN 800 MG/1
800 TABLET, FILM COATED ORAL EVERY 8 HOURS PRN
Qty: 30 TABLET | Refills: 0 | Status: SHIPPED | OUTPATIENT
Start: 2019-06-14 | End: 2019-06-22

## 2019-06-14 RX ADMIN — AMOXICILLIN AND CLAVULANATE POTASSIUM 1 TABLET: 875; 125 TABLET, FILM COATED ORAL at 19:05

## 2019-06-14 RX ADMIN — RABIES VACCINE 1 ML: KIT at 18:58

## 2019-06-14 NOTE — ED AVS SNAPSHOT
[facility]  [address] [address]  Phone:  [phone]                                    Cuco Cooper   MRN: [id number]    Department:  [facility]   Date of Visit:  6/14/2019           After Visit Summary Signature Page    I have received my discharge instructions, and my questions have been answered. I have discussed any challenges I see with this plan with the nurse or doctor.    ..........................................................................................................................................  Patient/Patient Representative Signature      ..........................................................................................................................................  Patient Representative Print Name and Relationship to Patient    ..................................................               ................................................  Date                                   Time    ..........................................................................................................................................  Reviewed by Signature/Title    ...................................................              ..............................................  Date                                               Time          22EPIC Rev 08/18

## 2019-06-14 NOTE — ED PROVIDER NOTES
History     Chief Complaint   Patient presents with     Dog Bite     HPI  Cuco Cooper is a 64 year old male who presents today ambulatory to the urgent care for an evaluation of a dog bite to the dorsal surface of the left hand.  He is left-hand dominant.  He states he was riding his 3 ochoa out in the woods came across a dog, tried to pet the dog bit him.  Does not know the dog, does not know the vaccination status of the dog, and does not feel like he can find the dog again.  He notes the bite happened in the woods close [place] his [place] acres [place] land that he lives [place], but he has never seen the dog before.  He states the dog looked mangy and like it had been stray for a while.  She is up-to-date with tetanus.  He has never been vaccinated for rabies before.      Allergies:  Allergies   Allergen Reactions     Blood Transfusion Related (Informational Only) Other (See Comments)     Patient has a history of a warm autoantibody.  A delay in compatible RBCs may occur.     Sulfa Drugs        Problem List:    Patient Active Problem List    Diagnosis Date Noted     Seizure disorder (H) 10/29/2015     Priority: High     Class: Acute     Pneumonia, community acquired 10/29/2015     Priority: High     Class: Acute     Diabetes mellitus, type II (H) 10/29/2015     Priority: Medium     Elevated brain natriuretic peptide (BNP) level 10/29/2015     Priority: Medium        Past Medical History:    Past Medical History:   Diagnosis Date     Asthma      Bipolar affective disorder (H)      Cannabis abuse      Chronic lymphatic leukemia (H)      DM (diabetes mellitus) (H)      Dyslipidemia      Hematemesis      HTN (hypertension)      Seizure disorder (H)      Seizures (H)      Status epilepticus (H)      TBI (traumatic brain injury) (H)        Past Surgical History:    Past Surgical History:   Procedure Laterality Date     C REPAIR SHOULDER CAPSULE,ANTERIOR      Left     C TOTAL HIP ARTHROPLASTY      Right     FOREARM SURGERY       Left     TONSILLECTOMY         Family History:    Family History   Problem Relation Age of Onset     Other Cancer Mother        Social History:  Marital Status:   [4]  Social History     Tobacco Use     Smoking status: Current Every Day Smoker     Packs/day: 1.00     Types: Cigarettes     Smokeless tobacco: Never Used   Substance Use Topics     Alcohol use: No     Alcohol/week: 0.0 oz     Drug use: Yes     Types: Marijuana     Comment: marijuana        Medications:      amoxicillin-clavulanate (AUGMENTIN) 875-125 MG tablet   ARIPiprazole (ABILIFY PO)   Citalopram Hydrobromide (CELEXA PO)   Divalproex Sodium (DEPAKOTE ER PO)   FOLIC ACID PO   FUROSEMIDE PO   HYDROcodone-acetaminophen (NORCO) 5-325 MG tablet   ibuprofen (ADVIL/MOTRIN) 800 MG tablet   LevETIRAcetam (KEPPRA PO)   lisinopril (PRINIVIL,ZESTRIL) 20 MG tablet   naproxen (NAPROSYN) 500 MG tablet   OMEPRAZOLE PO   Pioglitazone HCl (ACTOS PO)   QUEtiapine Fumarate (SEROQUEL PO)   Simvastatin (ZOCOR PO)         Review of Systems   Constitutional: Negative for appetite change, chills, fatigue and fever.   Musculoskeletal: Positive for arthralgias.   Skin: Positive for wound.       Physical Exam   BP: 121/77  Heart Rate: 75  Temp: 99  F (37.2  C)  Resp: 18  Weight: 95.3 kg (210 lb)  SpO2: 93 %      Physical Exam   Constitutional: He appears well-developed. He is cooperative. He does not appear ill.   HENT:   Head: Normocephalic and atraumatic.   Cardiovascular: Normal rate.   Pulmonary/Chest: Effort normal.   Musculoskeletal:        Right hand: He exhibits tenderness, bony tenderness, laceration (5 cm laceration across the dorsal suface of the hand diagonally, from over the 1st metacarpal to 4th metacarpal.  The 2nd extensor tendon in visualized in the wound, but is intact.  No FB noted) and swelling (generalized around the wound). He exhibits normal range of motion (He has full ROM of all fingers and good  strength, strength is WNL and overcome  opposed  pressure), normal two-point discrimination, normal capillary refill and no deformity. Normal sensation noted. Normal strength noted.   Neurological: He is alert.   Nursing note and vitals reviewed.      ED Course        Laceration repair  Date/Time: 6/14/2019 7:30 PM  Performed by: Mandy Mcfarland NP  Authorized by: Mandy Mcfarland NP   Consent: Verbal consent obtained. Written consent not obtained.  Risks and benefits: risks, benefits and alternatives were discussed  Consent given by: patient  Required items: required blood products, implants, devices, and special equipment available  Patient identity confirmed: verbally with patient and arm band  Body area: upper extremity  Location details: left hand  Laceration length: 5 cm  Foreign bodies: no foreign bodies  Tendon involvement: showing in wound, but intact.  Anesthesia: local infiltration    Anesthesia:  Local Anesthetic: lidocaine 2% without epinephrine  Anesthetic total: 10 mL    Sedation:  Patient sedated: no    Preparation: Patient was prepped and draped in the usual sterile fashion.  Irrigation solution: sterile water.  Irrigation method: syringe  Amount of cleaning: extensive  Debridement: none  Skin closure: 5-0 nylon  Number of sutures: 6  Technique: simple  Approximation: loose  Approximation difficulty: simple  Dressing: 2 x 2 sterile gauze covered by coban.  Patient tolerance: Patient tolerated the procedure well with no immediate complications        Medications   rabies vaccine, PCEC (chick embryo derived) (RABAVERT) injection 1 mL (1 mL Intramuscular Given 6/14/19 1858)   amoxicillin-clavulanate (AUGMENTIN) 875-125 MG per tablet 1 tablet (1 tablet Oral Given 6/14/19 1905)     Rabies immunoglobulin 1905 units ordered, 10 ml infused around the wound, 2.7 ml given IM right buttocks.\    Results for orders placed or performed during the hospital encounter of 06/14/19   XR Hand Left G/E 3 Views    Narrative    EXAM:XR HAND LT G/E 3  "VW     CLINICAL HISTORY: Patient Age  64 years Additional clinical info: dog  bite, r/o FB and fracture     COMPARISON: None      TECHNIQUE: 3 views      Impression    IMPRESSION: No fracture nor foreign body identified in the left hand.  Marked soft tissue swelling over the metacarpals. 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 Old fracture  deformity of the distal tuft of the fifth finger.    MAINOR GREER MD       Assessments & Plan (with Medical Decision Making)     I have reviewed the nursing notes.    I have reviewed the findings, diagnosis, plan and need for follow up with the patient.  ASSESSMENT / PLAN:  (S61.452A,  W54.0XXA) Dog bite, hand, left, initial encounter  Comment: unknown dog with unknown vaccination status, stray dog, unable to track down  Plan:  Rabies immunoglobulin as given above, Rabies vaccinations started  Follow up here for rabies vaccine on 6/17, 6/21, 6/28  Augmentin as prescribed, first dose given here  Lortab as prescribed, advised will give 2 days worth of pain medications, no further refills here - do not drive or participate in activities that require mental alertness while taking  Ibuprofen as prescribed - take consistently for pain  Keep clean and dry x 24 hours, then may shower as usual. Do not soak or swim   Follow up here for wound check on Monday, can do at the same time as rabies vaccine  Call tomorrow to make a follow up appointment for suture removal in 7-10 days  Watch for signs and symptoms of infection: Increasing redness, pain, warmth, fever, chills, malodor, increased drainage, and follow up here or with PCP if any arise  Patient verbally educated and discharge instructions given, verbalizes understanding, and all questions answered to the best of my ability.  Return to ED/UC if symptoms increase or concerns develop such as those discussed and listed on the \"When to go the Emergency Room\" portion of your discharge instructions.     (Z29.14) " Encounter for prophylactic administration of rabies immune globulin    (S61.412A) Laceration of left hand without foreign body, initial encounter  Comment: closed with 6 sutures  Plan: See above       Medication List      Started    amoxicillin-clavulanate 875-125 MG tablet  Commonly known as:  AUGMENTIN  1 tablet, Oral, 2 TIMES DAILY     HYDROcodone-acetaminophen 5-325 MG tablet  Commonly known as:  NORCO  1 tablet, Oral, EVERY 4 HOURS PRN     ibuprofen 800 MG tablet  Commonly known as:  ADVIL/MOTRIN  800 mg, Oral, EVERY 8 HOURS PRN            Final diagnoses:   Dog bite, hand, left, initial encounter   Encounter for prophylactic administration of rabies immune globulin   Laceration of left hand without foreign body, initial encounter - closed with 6 sutures       6/14/2019   [facility]     Mandy Mcfarland NP  06/15/19 0520

## 2019-06-14 NOTE — DISCHARGE INSTRUCTIONS
Follow up for Rabies Vaccinations on 6/17, 6/21, and 6/28  Watch for fever, chills, increased redness, smelly drainage, induration, increased pain, increased drainage  Keep dressing on tonight, may change dressing tomorrow evening  Take antibiotics as prescribed  Ibuprofen 800 mg every 8 hours for pain  Lortab as prescribed for pain - do not drive or participate in activities that require mental alertness while taking

## 2019-06-14 NOTE — ED NOTES
Pt states he was bit in the left hand by a dog while walking in the woods. Dog is unknown to him. Incident occurred 2 hours ago.

## 2019-06-14 NOTE — ED TRIAGE NOTES
Patient arrives for evaluation of dog bite to left hand. Patient was walking in woods when a dog came and bit him. Patient does not know the dog and was trying to pet it. Laceration noted to the top of left hand 3-4 cm. Reporting pain 9/10 to hand.

## 2019-06-15 ASSESSMENT — ENCOUNTER SYMPTOMS
WOUND: 1
APPETITE CHANGE: 0
CHILLS: 0
ARTHRALGIAS: 1
FATIGUE: 0
FEVER: 0

## 2019-06-17 ENCOUNTER — HOSPITAL ENCOUNTER (EMERGENCY)
Facility: HOSPITAL | Age: 64
Discharge: HOME | End: 2019-06-17
Attending: NURSE PRACTITIONER | Admitting: NURSE PRACTITIONER
Payer: MEDICARE

## 2019-06-17 VITALS
TEMPERATURE: 97.6 F | BODY MASS INDEX: 28.48 KG/M2 | DIASTOLIC BLOOD PRESSURE: 67 MMHG | HEIGHT: 72 IN | SYSTOLIC BLOOD PRESSURE: 119 MMHG | RESPIRATION RATE: 16 BRPM | OXYGEN SATURATION: 95 % | HEART RATE: 75 BPM

## 2019-06-17 DIAGNOSIS — Z51.89 VISIT FOR WOUND CHECK: ICD-10-CM

## 2019-06-17 DIAGNOSIS — Z23 ENCOUNTER FOR IMMUNIZATION: ICD-10-CM

## 2019-06-17 PROCEDURE — 99024 POSTOP FOLLOW-UP VISIT: CPT | Mod: Z6 | Performed by: NURSE PRACTITIONER

## 2019-06-17 PROCEDURE — 96372 THER/PROPH/DIAG INJ SC/IM: CPT

## 2019-06-17 PROCEDURE — 25000128 H RX IP 250 OP 636: Performed by: NURSE PRACTITIONER

## 2019-06-17 PROCEDURE — G0463 HOSPITAL OUTPT CLINIC VISIT: HCPCS | Mod: 25

## 2019-06-17 PROCEDURE — 90471 IMMUNIZATION ADMIN: CPT

## 2019-06-17 PROCEDURE — 90675 RABIES VACCINE IM: CPT | Performed by: NURSE PRACTITIONER

## 2019-06-17 RX ORDER — CEFTRIAXONE SODIUM 1 G
1 VIAL (EA) INJECTION ONCE
Status: COMPLETED | OUTPATIENT
Start: 2019-06-17 | End: 2019-06-17

## 2019-06-17 RX ADMIN — CEFTRIAXONE SODIUM 1 G: 1 INJECTION, POWDER, FOR SOLUTION INTRAMUSCULAR; INTRAVENOUS at 10:30

## 2019-06-17 RX ADMIN — RABIES VACCINE 1 ML: KIT at 10:12

## 2019-06-17 ASSESSMENT — ENCOUNTER SYMPTOMS
WEAKNESS: 0
FEVER: 0
NUMBNESS: 0
NECK STIFFNESS: 0
COUGH: 0
TROUBLE SWALLOWING: 0
NECK PAIN: 0
ACTIVITY CHANGE: 0
WOUND: 1
VOMITING: 0
PSYCHIATRIC NEGATIVE: 1
APPETITE CHANGE: 0
CHILLS: 0
DYSURIA: 0

## 2019-06-17 NOTE — ED TRIAGE NOTES
Pt has not been taking abx as prescribed, and has not changed the dressing to his hand since his initial visit when it was placed here.

## 2019-06-17 NOTE — ED AVS SNAPSHOT
[facility]  [address] [address]  Phone:  [phone]                                    Cuco Cooper   MRN: [id number]    Department:  [facility]   Date of Visit:  6/17/2019           After Visit Summary Signature Page    I have received my discharge instructions, and my questions have been answered. I have discussed any challenges I see with this plan with the nurse or doctor.    ..........................................................................................................................................  Patient/Patient Representative Signature      ..........................................................................................................................................  Patient Representative Print Name and Relationship to Patient    ..................................................               ................................................  Date                                   Time    ..........................................................................................................................................  Reviewed by Signature/Title    ...................................................              ..............................................  Date                                               Time          22EPIC Rev 08/18

## 2019-06-17 NOTE — DISCHARGE INSTRUCTIONS
Take antibiotics as ordered.   Eat a yogurt daily while taking antibiotics.   Take tylenol and/or ibuprofen for pain. Follow dosing on package.   Take Norco for pain not helped by tylenol and ibuprofen. Do not drive or participate in activities that require alertness when taking.   Rabies vaccine needed in 4 days.   Follow up with PCP with any increase in symptoms or concerns.   Return to urgent care or emergency department with any increase in symptoms or concerns.

## 2019-06-17 NOTE — ED PROVIDER NOTES
History     Chief Complaint   Patient presents with     Wound Check     Dog bite seen and evaluated on 6/14. Patient here for dressing change and possible rabies vacc.     The history is provided by the patient. No  was used.     Cuco Cooper is a 64 year old male who presents for rabies vaccine and wound check. He was bit on his left hand by a stray dog on 6-14-19 and was treated prophylacticly with rabies immunoglobulin and rabavert. Denies fever, chills, or night sweats. Eating and drinking well. Bowel and bladder are working well.     He was treated with 7 days of Augmentin. He has 9 tablets left of his antibiotics as he has the pill bottle with him.     He is diabetic, but doesn't check his blood sugar.       Allergies:  Allergies   Allergen Reactions     Blood Transfusion Related (Informational Only) Other (See Comments)     Patient has a history of a warm autoantibody.  A delay in compatible RBCs may occur.     Sulfa Drugs        Problem List:    Patient Active Problem List    Diagnosis Date Noted     Seizure disorder (H) 10/29/2015     Priority: High     Class: Acute     Pneumonia, community acquired 10/29/2015     Priority: High     Class: Acute     Diabetes mellitus, type II (H) 10/29/2015     Priority: Medium     Elevated brain natriuretic peptide (BNP) level 10/29/2015     Priority: Medium        Past Medical History:    Past Medical History:   Diagnosis Date     Asthma      Bipolar affective disorder (H)      Cannabis abuse      Chronic lymphatic leukemia (H)      DM (diabetes mellitus) (H)      Dyslipidemia      Hematemesis      HTN (hypertension)      Seizure disorder (H)      Seizures (H)      Status epilepticus (H)      TBI (traumatic brain injury) (H)        Past Surgical History:    Past Surgical History:   Procedure Laterality Date     C REPAIR SHOULDER CAPSULE,ANTERIOR      Left     C TOTAL HIP ARTHROPLASTY      Right     FOREARM SURGERY      Left     TONSILLECTOMY          Family History:    Family History   Problem Relation Age of Onset     Other Cancer Mother        Social History:  Marital Status:   [4]  Social History     Tobacco Use     Smoking status: Current Every Day Smoker     Packs/day: 1.00     Types: Cigarettes     Smokeless tobacco: Never Used   Substance Use Topics     Alcohol use: No     Alcohol/week: 0.0 oz     Drug use: Yes     Types: Marijuana     Comment: marijuana        Medications:      amoxicillin-clavulanate (AUGMENTIN) 875-125 MG tablet   ARIPiprazole (ABILIFY PO)   Citalopram Hydrobromide (CELEXA PO)   Divalproex Sodium (DEPAKOTE ER PO)   FOLIC ACID PO   FUROSEMIDE PO   HYDROcodone-acetaminophen (NORCO) 5-325 MG tablet   ibuprofen (ADVIL/MOTRIN) 800 MG tablet   LevETIRAcetam (KEPPRA PO)   lisinopril (PRINIVIL,ZESTRIL) 20 MG tablet   naproxen (NAPROSYN) 500 MG tablet   OMEPRAZOLE PO   Pioglitazone HCl (ACTOS PO)   QUEtiapine Fumarate (SEROQUEL PO)   Simvastatin (ZOCOR PO)         Review of Systems   Constitutional: Negative for activity change, appetite change, chills and fever.   HENT: Negative for trouble swallowing.    Respiratory: Negative for cough.    Gastrointestinal: Negative for vomiting.   Genitourinary: Negative for dysuria.   Musculoskeletal: Negative for neck pain and neck stiffness.   Skin: Positive for wound.        Wound to left hand from dog bite.    Neurological: Negative for weakness and numbness.   Psychiatric/Behavioral: Negative.        Physical Exam   BP: 119/67  Pulse: 75  Temp: 97.6  F (36.4  C)  Resp: 16  Height: 182.9 cm (6')  SpO2: 95 %      Physical Exam   Constitutional: He is oriented to person, place, and time. He appears well-developed and well-nourished. No distress.   HENT:   Head: Normocephalic.   Mouth/Throat: Oropharynx is clear and moist.   Neck: Normal range of motion. Neck supple.   Cardiovascular: Normal rate, regular rhythm, normal heart sounds and intact distal pulses.   No murmur  heard.  Pulmonary/Chest: Effort normal. No stridor. No respiratory distress. He has no wheezes. He has no rales.   Abdominal: Soft. He exhibits no distension.   Musculoskeletal: Normal range of motion. He exhibits tenderness. He exhibits no edema or deformity.   CMS and ROM intact to left upper extremity. Left radial pulse +2. Extension and flexion intact to all digits on left hand.    Neurological: He is alert and oriented to person, place, and time. He exhibits normal muscle tone.   Skin: Skin is warm and dry. Capillary refill takes less than 2 seconds. No rash noted. He is not diaphoretic. There is erythema.   Sutured laceration to dorsal side of hand. Stitches intact. Edges are well approximated. Erythema and warmth to the touch to left hand. Clear drainage from incision.    Psychiatric: He has a normal mood and affect. His behavior is normal.   Nursing note and vitals reviewed.      ED Course      Procedures    Medications   cefTRIAXone (ROCEPHIN) injection 1 g (has no administration in time range)   rabies vaccine, PCEC (chick embryo derived) (RABAVERT) injection 1 mL (1 mL Intramuscular Given 6/17/19 1012)     Monitored for 20 minutes and tolerated well.     Assessments & Plan (with Medical Decision Making)     Consulted with ALVARADO Craig to discuss living arrangement and see if further resources are needed. He needs to change dressing at minimum daily on left hand. Gini arranged for Vinny MIRAMONTES RN with [facility] to make a visit to his home for wound check. He will come back to  for further rabies vaccines as directed (6/21 & 6/28).    He has a history of a TBI and has difficulty remembering things.     Left hand wound is infected. No fever here today. He reports taking Augmentin as directed. He picked his prescription up on 6-15-19 and has 9 tablets left. This does equal out that he is taking Augmentin as directed. Rocephin 1 gram IM administered in  today.     Discussed plan of care. He  verbalized understanding. All questions answered.     I have reviewed the nursing notes.    I have reviewed the findings, diagnosis, plan and need for follow up with the patient.  Discharged in stable condition.        Medication List      There are no discharge medications for this visit.         Final diagnoses:   Visit for wound check   Encounter for immunization       Continue Augmentin as directed.   Eat a yogurt daily while taking antibiotics.   Take tylenol and/or ibuprofen for pain. Follow dosing on package.   Take Norco for pain not helped by tylenol and ibuprofen. Do not drive or participate in activities that require alertness when taking.   Rabies vaccine needed in 4 days.   Follow up with PCP with any increase in symptoms or concerns.   Return to urgent care or emergency department with any increase in symptoms or concerns.     RENY Baptiste  6/17/2019  9:41 AM  [facility]       Juana Teresa NP  06/17/19 2245

## 2019-06-17 NOTE — ED TRIAGE NOTES
C/o needing dressing change from a dog bite and another dose of rabies vaccination. Pt confused about written script that he was given for pain medication. Pt instructed to bring written script to pharmacy after his visit today so he can have more pain medications, pt has been using his take home starter package of medication that was given during his initial visit.

## 2019-06-21 ENCOUNTER — HOSPITAL ENCOUNTER (EMERGENCY)
Facility: HOSPITAL | Age: 64
Discharge: HOME | End: 2019-06-21
Attending: PHYSICIAN ASSISTANT | Admitting: PHYSICIAN ASSISTANT
Payer: MEDICARE

## 2019-06-21 VITALS
DIASTOLIC BLOOD PRESSURE: 73 MMHG | RESPIRATION RATE: 16 BRPM | OXYGEN SATURATION: 95 % | HEART RATE: 80 BPM | SYSTOLIC BLOOD PRESSURE: 124 MMHG | TEMPERATURE: 98.2 F

## 2019-06-21 PROCEDURE — G0463 HOSPITAL OUTPT CLINIC VISIT: HCPCS | Mod: 25

## 2019-06-21 PROCEDURE — 90471 IMMUNIZATION ADMIN: CPT

## 2019-06-21 PROCEDURE — 25000128 H RX IP 250 OP 636: Performed by: PHYSICIAN ASSISTANT

## 2019-06-21 PROCEDURE — 90675 RABIES VACCINE IM: CPT | Performed by: PHYSICIAN ASSISTANT

## 2019-06-21 RX ADMIN — RABIES VACCINE 1 ML: KIT at 10:48

## 2019-06-21 NOTE — ED TRIAGE NOTES
"Pt is needing his next dose of rabies vaccine. Pt's hand appears better than the last visit where the whole hand was very red, and swollen, and hot to touch. Minimal area of redness around site, sutures are intact, and no swelling noted to hand. Pt states he has been cleaning it everyday like instructed and taking his oral abx, states his hand \"feels better\" compared to the last visit.  "

## 2019-06-25 ENCOUNTER — HOSPITAL ENCOUNTER (EMERGENCY)
Facility: HOSPITAL | Age: 64
Discharge: HOME | End: 2019-06-25
Attending: NURSE PRACTITIONER | Admitting: NURSE PRACTITIONER
Payer: MEDICARE

## 2019-06-25 VITALS
TEMPERATURE: 98.9 F | SYSTOLIC BLOOD PRESSURE: 140 MMHG | OXYGEN SATURATION: 96 % | DIASTOLIC BLOOD PRESSURE: 103 MMHG | RESPIRATION RATE: 16 BRPM | HEART RATE: 106 BPM
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
## 2019-06-25 PROCEDURE — G0463 HOSPITAL OUTPT CLINIC VISIT: HCPCS

## 2019-06-25 PROCEDURE — 99213 OFFICE O/P EST LOW 20 MIN: CPT | Mod: Z6 | Performed by: NURSE PRACTITIONER

## 2019-06-25 RX ORDER — DOXYCYCLINE 100 MG/1
200 CAPSULE ORAL ONCE
Qty: 2 CAPSULE | Refills: 0 | Status: SHIPPED | OUTPATIENT
Start: 2019-06-25 | End: 2019-06-25

## 2019-06-25 ASSESSMENT — ENCOUNTER SYMPTOMS
NECK STIFFNESS: 0
CHILLS: 0
PSYCHIATRIC NEGATIVE: 1
WOUND: 0
ACTIVITY CHANGE: 0
FEVER: 0
NECK PAIN: 0
COUGH: 0
APPETITE CHANGE: 0
WEAKNESS: 0
DYSURIA: 0

## 2019-06-25 NOTE — ED AVS SNAPSHOT
[facility]  [address] [address]  Phone:  [phone]                                    Cuco Cooper   MRN: [id number]    Department:  [facility]   Date of Visit:  6/25/2019           After Visit Summary Signature Page    I have received my discharge instructions, and my questions have been answered. I have discussed any challenges I see with this plan with the nurse or doctor.    ..........................................................................................................................................  Patient/Patient Representative Signature      ..........................................................................................................................................  Patient Representative Print Name and Relationship to Patient    ..................................................               ................................................  Date                                   Time    ..........................................................................................................................................  Reviewed by Signature/Title    ...................................................              ..............................................  Date                                               Time          22EPIC Rev 08/18

## 2019-06-25 NOTE — ED PROVIDER NOTES
History     Chief Complaint   Patient presents with     Insect Bite     c/o tic bite 2-3 days ago and his dtr told him to come to the hospital because he has lymes disease     The history is provided by the patient. No  was used.     Cuco Cooper is a 64 year old male who presents with a tick bite to left thigh. Tick was on skin for 36 hours or less and was not engorged. He didn't look at the size of the tick before he disposed of it. Denies fever, chills, or night sweats. Eating and drinking well. Bowel and bladder are working well. He's taken Augmentin in past 30 days for a dog bite.       Allergies:  Allergies   Allergen Reactions     Blood Transfusion Related (Informational Only) Other (See Comments)     Patient has a history of a warm autoantibody.  A delay in compatible RBCs may occur.     Sulfa Drugs        Problem List:    Patient Active Problem List    Diagnosis Date Noted     Seizure disorder (H) 10/29/2015     Priority: High     Class: Acute     Pneumonia, community acquired 10/29/2015     Priority: High     Class: Acute     Diabetes mellitus, type II (H) 10/29/2015     Priority: Medium     Elevated brain natriuretic peptide (BNP) level 10/29/2015     Priority: Medium        Past Medical History:    Past Medical History:   Diagnosis Date     Asthma      Bipolar affective disorder (H)      Cannabis abuse      Chronic lymphatic leukemia (H)      DM (diabetes mellitus) (H)      Dyslipidemia      Hematemesis      HTN (hypertension)      Seizure disorder (H)      Seizures (H)      Status epilepticus (H)      TBI (traumatic brain injury) (H)        Past Surgical History:    Past Surgical History:   Procedure Laterality Date     C REPAIR SHOULDER CAPSULE,ANTERIOR      Left     C TOTAL HIP ARTHROPLASTY      Right     FOREARM SURGERY      Left     TONSILLECTOMY         Family History:    Family History   Problem Relation Age of Onset     Other Cancer Mother        Social History:  Marital  Status:   [4]  Social History     Tobacco Use     Smoking status: Current Every Day Smoker     Packs/day: 1.00     Types: Cigarettes     Smokeless tobacco: Never Used   Substance Use Topics     Alcohol use: No     Alcohol/week: 0.0 oz     Drug use: Yes     Types: Marijuana     Comment: marijuana        Medications:      ARIPiprazole (ABILIFY PO)   Citalopram Hydrobromide (CELEXA PO)   Divalproex Sodium (DEPAKOTE ER PO)   doxycycline hyclate (VIBRAMYCIN) 100 MG capsule   FOLIC ACID PO   FUROSEMIDE PO   HYDROcodone-acetaminophen (NORCO) 5-325 MG tablet   LevETIRAcetam (KEPPRA PO)   lisinopril (PRINIVIL,ZESTRIL) 20 MG tablet   naproxen (NAPROSYN) 500 MG tablet   OMEPRAZOLE PO   Pioglitazone HCl (ACTOS PO)   QUEtiapine Fumarate (SEROQUEL PO)   Simvastatin (ZOCOR PO)         Review of Systems   Constitutional: Negative for activity change, appetite change, chills and fever.   Respiratory: Negative for cough.    Genitourinary: Negative for dysuria.   Musculoskeletal: Negative for neck pain and neck stiffness.   Skin: Negative for rash and wound.        Tick bite to left thigh.    Neurological: Negative for weakness.   Psychiatric/Behavioral: Negative.        Physical Exam   BP: (!) 140/103  Temp: 98.9  F (37.2  C)  Resp: 16  SpO2: 96 %  Heart Rate: 106    Physical Exam   Constitutional: He is oriented to person, place, and time. He appears well-developed and well-nourished. No distress.   HENT:   Head: Normocephalic.   Mouth/Throat: Oropharynx is clear and moist.   Neck: Normal range of motion. Neck supple.   Cardiovascular: Normal rate, regular rhythm and normal heart sounds.   Pulmonary/Chest: Effort normal. No respiratory distress.   Abdominal: Soft. He exhibits no distension.   Musculoskeletal: Normal range of motion.        Legs:  Neurological: He is alert and oriented to person, place, and time. He exhibits normal muscle tone.   Skin: Skin is warm and dry. Capillary refill takes less than 2 seconds. No rash  noted. He is not diaphoretic. There is erythema.   0.5 cm area of erythema to left anterior upper thigh. Skin has ecchymosis from bandage being on skin. No drainage. No warmth to the touch. No enlargement palpated to left inguinal lymph nodes.    Psychiatric: He has a normal mood and affect. His behavior is normal.   Nursing note and vitals reviewed.      ED Course      Procedures      Assessments & Plan (with Medical Decision Making)     Discussed plan of care. He verbalized understanding. All questions answered.     I have reviewed the nursing notes.    I have reviewed the findings, diagnosis, plan and need for follow up with the patient.  Discharged in stable condition.        Medication List      Started    doxycycline hyclate 100 MG capsule  Commonly known as:  VIBRAMYCIN  200 mg, Oral, ONCE            Final diagnoses:   Tick bite     Take both pills at once.   Eat a yogurt today.   Watch tick bite site.   Follow up with PCP with any increase in symptoms or concerns.   Return to urgent care or emergency department with any increase in symptoms or concerns.     RENY Baptiste  6/25/2019  4:29 PM  [facility]       Juana Teresa NP  06/25/19 1745       Juana Teresa NP  06/25/19 1745

## 2019-06-25 NOTE — DISCHARGE INSTRUCTIONS
Take both pills at once.   Eat a yogurt today.   Watch tick bite site.   Follow up with PCP with any increase in symptoms or concerns.   Return to urgent care or emergency department with any increase in symptoms or concerns.

## 2019-06-28 ENCOUNTER — HOSPITAL ENCOUNTER (EMERGENCY)
Facility: HOSPITAL | Age: 64
Discharge: HOME | End: 2019-06-28
Attending: NURSE PRACTITIONER | Admitting: NURSE PRACTITIONER
Payer: MEDICARE

## 2019-06-28 VITALS
SYSTOLIC BLOOD PRESSURE: 127 MMHG | RESPIRATION RATE: 18 BRPM | HEART RATE: 102 BPM | TEMPERATURE: 98.8 F | DIASTOLIC BLOOD PRESSURE: 94 MMHG | OXYGEN SATURATION: 96 %

## 2019-06-28 DIAGNOSIS — Z23 ENCOUNTER FOR ADMINISTRATION OF VACCINE: ICD-10-CM

## 2019-06-28 DIAGNOSIS — Z48.02 ENCOUNTER FOR REMOVAL OF SUTURES: ICD-10-CM

## 2019-06-28 PROCEDURE — 25000128 H RX IP 250 OP 636: Performed by: NURSE PRACTITIONER

## 2019-06-28 PROCEDURE — 90675 RABIES VACCINE IM: CPT | Performed by: NURSE PRACTITIONER

## 2019-06-28 PROCEDURE — 99213 OFFICE O/P EST LOW 20 MIN: CPT | Mod: Z6 | Performed by: NURSE PRACTITIONER

## 2019-06-28 PROCEDURE — G0463 HOSPITAL OUTPT CLINIC VISIT: HCPCS | Mod: 25

## 2019-06-28 PROCEDURE — 90471 IMMUNIZATION ADMIN: CPT

## 2019-06-28 RX ADMIN — RABIES VACCINE 1 ML: KIT at 14:30

## 2019-06-28 ASSESSMENT — ENCOUNTER SYMPTOMS
ACTIVITY CHANGE: 0
WOUND: 1
APPETITE CHANGE: 0
WEAKNESS: 0
PSYCHIATRIC NEGATIVE: 1
COUGH: 0
FEVER: 0
DYSURIA: 0
TROUBLE SWALLOWING: 0
CHILLS: 0

## 2019-06-28 NOTE — ED PROVIDER NOTES
History     Chief Complaint   Patient presents with     Suture Removal     Suture removal for dog bite wound and rabies vaccination.     The history is provided by the patient. No  was used.     Cuco Cooper is a 64 year old male who presents for suture removal from left hand and rabies vaccine. He was bit by a stray dog on his left hand on 6-14-19 and had initial treatment done at this facility. He's been feeling well. Denies fever, chills, or night sweats. Eating and drinking well. Bowel and bladder are working well.     He is due for his 4th injection of RABAVERT today.    Allergies:  Allergies   Allergen Reactions     Blood Transfusion Related (Informational Only) Other (See Comments)     Patient has a history of a warm autoantibody.  A delay in compatible RBCs may occur.     Sulfa Drugs        Problem List:    Patient Active Problem List    Diagnosis Date Noted     Seizure disorder (H) 10/29/2015     Priority: High     Class: Acute     Pneumonia, community acquired 10/29/2015     Priority: High     Class: Acute     Diabetes mellitus, type II (H) 10/29/2015     Priority: Medium     Elevated brain natriuretic peptide (BNP) level 10/29/2015     Priority: Medium        Past Medical History:    Past Medical History:   Diagnosis Date     Asthma      Bipolar affective disorder (H)      Cannabis abuse      Chronic lymphatic leukemia (H)      DM (diabetes mellitus) (H)      Dyslipidemia      Hematemesis      HTN (hypertension)      Seizure disorder (H)      Seizures (H)      Status epilepticus (H)      TBI (traumatic brain injury) (H)        Past Surgical History:    Past Surgical History:   Procedure Laterality Date     C REPAIR SHOULDER CAPSULE,ANTERIOR      Left     C TOTAL HIP ARTHROPLASTY      Right     FOREARM SURGERY      Left     TONSILLECTOMY         Family History:    Family History   Problem Relation Age of Onset     Other Cancer Mother        Social History:  Marital Status:    [4]  Social History     Tobacco Use     Smoking status: Current Every Day Smoker     Packs/day: 1.00     Types: Cigarettes     Smokeless tobacco: Never Used   Substance Use Topics     Alcohol use: No     Alcohol/week: 0.0 oz     Drug use: Yes     Types: Marijuana     Comment: marijuana        Medications:      ARIPiprazole (ABILIFY PO)   Citalopram Hydrobromide (CELEXA PO)   Divalproex Sodium (DEPAKOTE ER PO)   FOLIC ACID PO   FUROSEMIDE PO   HYDROcodone-acetaminophen (NORCO) 5-325 MG tablet   LevETIRAcetam (KEPPRA PO)   lisinopril (PRINIVIL,ZESTRIL) 20 MG tablet   naproxen (NAPROSYN) 500 MG tablet   OMEPRAZOLE PO   Pioglitazone HCl (ACTOS PO)   QUEtiapine Fumarate (SEROQUEL PO)   Simvastatin (ZOCOR PO)         Review of Systems   Constitutional: Negative for activity change, appetite change, chills and fever.   HENT: Negative for trouble swallowing.    Respiratory: Negative for cough.    Genitourinary: Negative for dysuria.   Skin: Positive for wound.        Sutures in place in left hand.    Neurological: Negative for weakness.   Psychiatric/Behavioral: Negative.        Physical Exam   BP: 127/94  Pulse: 102  Temp: 98.8  F (37.1  C)  Resp: 18  SpO2: 96 %      Physical Exam   Constitutional: He is oriented to person, place, and time. He appears well-developed and well-nourished. No distress.   HENT:   Head: Normocephalic.   Mouth/Throat: Oropharynx is clear and moist.   Neck: Normal range of motion. Neck supple.   Cardiovascular: Normal rate, regular rhythm and normal heart sounds.   No murmur heard.  Pulmonary/Chest: Effort normal. No stridor. No respiratory distress. He has no wheezes. He has no rales.   Abdominal: Soft. He exhibits no distension.   Musculoskeletal: Normal range of motion.   Neurological: He is alert and oriented to person, place, and time. He exhibits normal muscle tone.   Skin: Skin is warm and dry. Capillary refill takes less than 2 seconds. No rash noted. He is not diaphoretic. No erythema.    Incision C/D/I without drainage or warmth to the touch.    Psychiatric: He has a normal mood and affect. His behavior is normal.   Nursing note and vitals reviewed.      ED Course     Suture removal  Performed by: Juana Teresa NP  Authorized by: Juana Teresa NP   Consent: Verbal consent obtained.  Risks and benefits: risks, benefits and alternatives were discussed  Consent given by: patient  Patient understanding: patient states understanding of the procedure being performed  Patient identity confirmed: verbally with patient  Body area: upper extremity  Location details: left hand  Wound Appearance: clean  Sutures removed: 6.  Post-removal: Steri-Strips applied  Facility: sutures placed in this facility  Patient tolerance: Patient tolerated the procedure well with no immediate complications        Medications   rabies vaccine, PCEC (chick embryo derived) (RABAVERT) injection 1 mL (1 mL Intramuscular Given 6/28/19 1430)     Monitored for 20 minutes and tolerated well.     Assessments & Plan (with Medical Decision Making)     Discussed plan of care. He verbalized understanding. All questions answered.     I have reviewed the nursing notes.    I have reviewed the findings, diagnosis, plan and need for follow up with the patient.  Discharged in stable condition.        Medication List      There are no discharge medications for this visit.         Final diagnoses:   Encounter for removal of sutures   Encounter for administration of vaccine     Keep steri strips on hand until they fall off.   Watch for signs of infection.   We administered your last vaccine for rabies today.   Follow up with PCP with any increase in symptoms or concerns.   Return to urgent care or emergency department with any increase in symptoms or concerns.     RENY Baptiste  6/28/2019  2:04 PM  [facility]       Juana Teresa NP  06/28/19 3451

## 2019-06-28 NOTE — DISCHARGE INSTRUCTIONS
Keep steri strips on hand until they fall off.   Watch for signs of infection.   We administered your last vaccine for rabies today.   Follow up with PCP with any increase in symptoms or concerns.   Return to urgent care or emergency department with any increase in symptoms or concerns.

## 2019-06-28 NOTE — ED AVS SNAPSHOT
[facility]  [address] [address]  Phone:  [phone]                                    Cuco Cooper   MRN: [id number]    Department:  [facility]   Date of Visit:  6/28/2019           After Visit Summary Signature Page    I have received my discharge instructions, and my questions have been answered. I have discussed any challenges I see with this plan with the nurse or doctor.    ..........................................................................................................................................  Patient/Patient Representative Signature      ..........................................................................................................................................  Patient Representative Print Name and Relationship to Patient    ..................................................               ................................................  Date                                   Time    ..........................................................................................................................................  Reviewed by Signature/Title    ...................................................              ..............................................  Date                                               Time          22EPIC Rev 08/18

## 2019-06-28 NOTE — ED NOTES
Six sutures to pt's left hand removed after betadine applied to site. Rabies vaccine given to right deltoid. Pt tolerated well.

## 2020-12-13 ENCOUNTER — HOSPITAL ENCOUNTER (INPATIENT)
Facility: HOSPITAL | Age: 65
LOS: 5 days | Discharge: ANOTHER HOSPITAL | DRG: 840 | End: 2020-12-18
Attending: PHYSICIAN ASSISTANT | Admitting: INTERNAL MEDICINE
Payer: MEDICARE

## 2020-12-13 ENCOUNTER — APPOINTMENT (OUTPATIENT)
Dept: GENERAL RADIOLOGY | Facility: HOSPITAL | Age: 65
DRG: 840 | End: 2020-12-13
Attending: PHYSICIAN ASSISTANT
Payer: MEDICARE

## 2020-12-13 DIAGNOSIS — Z91.89 AT HIGH RISK FOR INFECTION DUE TO CHEMOTHERAPY: ICD-10-CM

## 2020-12-13 DIAGNOSIS — J15.9 COMMUNITY ACQUIRED BACTERIAL PNEUMONIA: ICD-10-CM

## 2020-12-13 DIAGNOSIS — S21.201A: Primary | ICD-10-CM

## 2020-12-13 DIAGNOSIS — C91.10 CLL (CHRONIC LYMPHOCYTIC LEUKEMIA) (H): ICD-10-CM

## 2020-12-13 DIAGNOSIS — D64.9 ANEMIA: ICD-10-CM

## 2020-12-13 DIAGNOSIS — Z92.21 AT HIGH RISK FOR INFECTION DUE TO CHEMOTHERAPY: ICD-10-CM

## 2020-12-13 LAB
ALBUMIN SERPL-MCNC: 2.1 G/DL (ref 3.4–5)
ALBUMIN UR-MCNC: 10 MG/DL
ALP SERPL-CCNC: 235 U/L (ref 40–150)
ALT SERPL W P-5'-P-CCNC: 21 U/L (ref 0–70)
ANION GAP SERPL CALCULATED.3IONS-SCNC: 8 MMOL/L (ref 3–14)
ANISOCYTOSIS BLD QL SMEAR: SLIGHT
APPEARANCE UR: CLEAR
AST SERPL W P-5'-P-CCNC: 40 U/L (ref 0–45)
BACTERIA #/AREA URNS HPF: ABNORMAL /HPF
BASOPHILS # BLD AUTO: 0 10E9/L (ref 0–0.2)
BASOPHILS NFR BLD AUTO: 0 %
BILIRUB SERPL-MCNC: 0.5 MG/DL (ref 0.2–1.3)
BILIRUB UR QL STRIP: NEGATIVE
BLD PROD TYP BPU: NORMAL
BLD PROD TYP BPU: NORMAL
BLD UNIT ID BPU: 0
BLD UNIT ID BPU: 0
BLOOD PRODUCT CODE: NORMAL
BLOOD PRODUCT CODE: NORMAL
BPU ID: NORMAL
BPU ID: NORMAL
BUN SERPL-MCNC: 17 MG/DL (ref 7–30)
CALCIUM SERPL-MCNC: 8.3 MG/DL (ref 8.5–10.1)
CHLORIDE SERPL-SCNC: 104 MMOL/L (ref 94–109)
CO2 SERPL-SCNC: 26 MMOL/L (ref 20–32)
COLOR UR AUTO: YELLOW
CREAT SERPL-MCNC: 0.89 MG/DL (ref 0.66–1.25)
DIFFERENTIAL METHOD BLD: ABNORMAL
EOSINOPHIL # BLD AUTO: 0 10E9/L (ref 0–0.7)
EOSINOPHIL NFR BLD AUTO: 0 %
ERYTHROCYTE [DISTWIDTH] IN BLOOD BY AUTOMATED COUNT: 18.2 % (ref 10–15)
FLUAV+FLUBV RNA SPEC QL NAA+PROBE: NEGATIVE
FLUAV+FLUBV RNA SPEC QL NAA+PROBE: NEGATIVE
GFR SERPL CREATININE-BSD FRML MDRD: 89 ML/MIN/{1.73_M2}
GLUCOSE BLDC GLUCOMTR-MCNC: 127 MG/DL (ref 70–99)
GLUCOSE SERPL-MCNC: 141 MG/DL (ref 70–99)
GLUCOSE UR STRIP-MCNC: NEGATIVE MG/DL
HCT VFR BLD AUTO: 22.5 % (ref 40–53)
HGB BLD-MCNC: 6.6 G/DL (ref 13.3–17.7)
HGB UR QL STRIP: ABNORMAL
KETONES UR STRIP-MCNC: NEGATIVE MG/DL
LABORATORY COMMENT REPORT: NORMAL
LACTATE BLD-SCNC: 1.9 MMOL/L (ref 0.7–2)
LEUKOCYTE ESTERASE UR QL STRIP: NEGATIVE
LYMPHOCYTES # BLD AUTO: 35 10E9/L (ref 0.8–5.3)
LYMPHOCYTES NFR BLD AUTO: 92 %
MCH RBC QN AUTO: 31.3 PG (ref 26.5–33)
MCHC RBC AUTO-ENTMCNC: 29.3 G/DL (ref 31.5–36.5)
MCV RBC AUTO: 107 FL (ref 78–100)
MONOCYTES # BLD AUTO: 0 10E9/L (ref 0–1.3)
MONOCYTES NFR BLD AUTO: 0 %
MUCOUS THREADS #/AREA URNS LPF: PRESENT /LPF
NEUTROPHILS # BLD AUTO: 3 10E9/L (ref 1.6–8.3)
NEUTROPHILS NFR BLD AUTO: 8 %
NITRATE UR QL: NEGATIVE
PH UR STRIP: 6.5 PH (ref 4.7–8)
PLATELET # BLD AUTO: 445 10E9/L (ref 150–450)
PLATELET # BLD EST: ABNORMAL 10*3/UL
POTASSIUM SERPL-SCNC: 3.8 MMOL/L (ref 3.4–5.3)
PROCALCITONIN SERPL-MCNC: 0.08 NG/ML
PROT SERPL-MCNC: 7.1 G/DL (ref 6.8–8.8)
RBC # BLD AUTO: 2.11 10E12/L (ref 4.4–5.9)
RBC #/AREA URNS AUTO: 7 /HPF (ref 0–2)
RBC MORPH BLD: ABNORMAL
RSV RNA SPEC QL NAA+PROBE: NEGATIVE
SARS-COV-2 RNA SPEC QL NAA+PROBE: NEGATIVE
SODIUM SERPL-SCNC: 138 MMOL/L (ref 133–144)
SOURCE: ABNORMAL
SP GR UR STRIP: 1.03 (ref 1–1.03)
SPECIMEN SOURCE: NORMAL
SQUAMOUS #/AREA URNS AUTO: 0 /HPF (ref 0–1)
TRANSFUSION STATUS PATIENT QL: NORMAL
UROBILINOGEN UR STRIP-MCNC: 2 MG/DL (ref 0–2)
WBC # BLD AUTO: 38 10E9/L (ref 4–11)
WBC #/AREA URNS AUTO: 2 /HPF (ref 0–5)

## 2020-12-13 PROCEDURE — 258N000003 HC RX IP 258 OP 636: Performed by: PHYSICIAN ASSISTANT

## 2020-12-13 PROCEDURE — 120N000001 HC R&B MED SURG/OB

## 2020-12-13 PROCEDURE — 84145 PROCALCITONIN (PCT): CPT | Performed by: PHYSICIAN ASSISTANT

## 2020-12-13 PROCEDURE — 99223 1ST HOSP IP/OBS HIGH 75: CPT | Mod: AI | Performed by: INTERNAL MEDICINE

## 2020-12-13 PROCEDURE — 85025 COMPLETE CBC W/AUTO DIFF WBC: CPT | Performed by: PHYSICIAN ASSISTANT

## 2020-12-13 PROCEDURE — 86901 BLOOD TYPING SEROLOGIC RH(D): CPT | Performed by: PHYSICIAN ASSISTANT

## 2020-12-13 PROCEDURE — 96367 TX/PROPH/DG ADDL SEQ IV INF: CPT

## 2020-12-13 PROCEDURE — 99285 EMERGENCY DEPT VISIT HI MDM: CPT | Mod: 25

## 2020-12-13 PROCEDURE — 86850 RBC ANTIBODY SCREEN: CPT | Performed by: PHYSICIAN ASSISTANT

## 2020-12-13 PROCEDURE — 99285 EMERGENCY DEPT VISIT HI MDM: CPT | Performed by: PHYSICIAN ASSISTANT

## 2020-12-13 PROCEDURE — 250N000011 HC RX IP 250 OP 636: Performed by: PHYSICIAN ASSISTANT

## 2020-12-13 PROCEDURE — 96365 THER/PROPH/DIAG IV INF INIT: CPT

## 2020-12-13 PROCEDURE — 87636 SARSCOV2 & INF A&B AMP PRB: CPT | Performed by: PHYSICIAN ASSISTANT

## 2020-12-13 PROCEDURE — 80053 COMPREHEN METABOLIC PANEL: CPT | Performed by: PHYSICIAN ASSISTANT

## 2020-12-13 PROCEDURE — P9016 RBC LEUKOCYTES REDUCED: HCPCS | Performed by: PHYSICIAN ASSISTANT

## 2020-12-13 PROCEDURE — C9803 HOPD COVID-19 SPEC COLLECT: HCPCS

## 2020-12-13 PROCEDURE — 86900 BLOOD TYPING SEROLOGIC ABO: CPT | Performed by: PHYSICIAN ASSISTANT

## 2020-12-13 PROCEDURE — 86922 COMPATIBILITY TEST ANTIGLOB: CPT | Performed by: PHYSICIAN ASSISTANT

## 2020-12-13 PROCEDURE — 87040 BLOOD CULTURE FOR BACTERIA: CPT | Performed by: PHYSICIAN ASSISTANT

## 2020-12-13 PROCEDURE — 81001 URINALYSIS AUTO W/SCOPE: CPT | Performed by: PHYSICIAN ASSISTANT

## 2020-12-13 PROCEDURE — 83605 ASSAY OF LACTIC ACID: CPT | Performed by: PHYSICIAN ASSISTANT

## 2020-12-13 PROCEDURE — 71045 X-RAY EXAM CHEST 1 VIEW: CPT

## 2020-12-13 PROCEDURE — 999N001017 HC STATISTIC GLUCOSE BY METER IP

## 2020-12-13 PROCEDURE — 250N000013 HC RX MED GY IP 250 OP 250 PS 637: Performed by: INTERNAL MEDICINE

## 2020-12-13 PROCEDURE — 30233N1 TRANSFUSION OF NONAUTOLOGOUS RED BLOOD CELLS INTO PERIPHERAL VEIN, PERCUTANEOUS APPROACH: ICD-10-PCS | Performed by: INTERNAL MEDICINE

## 2020-12-13 PROCEDURE — 250N000011 HC RX IP 250 OP 636: Performed by: INTERNAL MEDICINE

## 2020-12-13 RX ORDER — HYDROCODONE BITARTRATE AND ACETAMINOPHEN 5; 325 MG/1; MG/1
1 TABLET ORAL EVERY 4 HOURS PRN
Status: DISCONTINUED | OUTPATIENT
Start: 2020-12-13 | End: 2020-12-14

## 2020-12-13 RX ORDER — LIDOCAINE 40 MG/G
CREAM TOPICAL
Status: DISCONTINUED | OUTPATIENT
Start: 2020-12-13 | End: 2020-12-18 | Stop reason: HOSPADM

## 2020-12-13 RX ORDER — NALOXONE HYDROCHLORIDE 0.4 MG/ML
0.4 INJECTION, SOLUTION INTRAMUSCULAR; INTRAVENOUS; SUBCUTANEOUS
Status: DISCONTINUED | OUTPATIENT
Start: 2020-12-13 | End: 2020-12-18 | Stop reason: HOSPADM

## 2020-12-13 RX ORDER — CEFTRIAXONE SODIUM 2 G/50ML
2 INJECTION, SOLUTION INTRAVENOUS ONCE
Status: COMPLETED | OUTPATIENT
Start: 2020-12-13 | End: 2020-12-13

## 2020-12-13 RX ORDER — QUETIAPINE FUMARATE 100 MG/1
100 TABLET, FILM COATED ORAL AT BEDTIME
Status: DISCONTINUED | OUTPATIENT
Start: 2020-12-13 | End: 2020-12-18 | Stop reason: HOSPADM

## 2020-12-13 RX ORDER — PIOGLITAZONEHYDROCHLORIDE 15 MG/1
15 TABLET ORAL DAILY
Status: DISCONTINUED | OUTPATIENT
Start: 2020-12-14 | End: 2020-12-18 | Stop reason: HOSPADM

## 2020-12-13 RX ORDER — NALOXONE HYDROCHLORIDE 0.4 MG/ML
0.2 INJECTION, SOLUTION INTRAMUSCULAR; INTRAVENOUS; SUBCUTANEOUS
Status: DISCONTINUED | OUTPATIENT
Start: 2020-12-13 | End: 2020-12-18 | Stop reason: HOSPADM

## 2020-12-13 RX ORDER — CITALOPRAM HYDROBROMIDE 20 MG/1
60 TABLET ORAL DAILY
Status: DISCONTINUED | OUTPATIENT
Start: 2020-12-14 | End: 2020-12-18 | Stop reason: HOSPADM

## 2020-12-13 RX ORDER — POLYETHYLENE GLYCOL 3350 17 G/17G
17 POWDER, FOR SOLUTION ORAL DAILY PRN
Status: DISCONTINUED | OUTPATIENT
Start: 2020-12-13 | End: 2020-12-18 | Stop reason: HOSPADM

## 2020-12-13 RX ORDER — ARIPIPRAZOLE 5 MG/1
5 TABLET ORAL DAILY
Status: DISCONTINUED | OUTPATIENT
Start: 2020-12-14 | End: 2020-12-14

## 2020-12-13 RX ORDER — ONDANSETRON 2 MG/ML
4 INJECTION INTRAMUSCULAR; INTRAVENOUS EVERY 6 HOURS PRN
Status: DISCONTINUED | OUTPATIENT
Start: 2020-12-13 | End: 2020-12-18 | Stop reason: HOSPADM

## 2020-12-13 RX ORDER — LISINOPRIL 10 MG/1
20 TABLET ORAL DAILY
Status: DISCONTINUED | OUTPATIENT
Start: 2020-12-14 | End: 2020-12-14

## 2020-12-13 RX ORDER — SIMVASTATIN 10 MG
20 TABLET ORAL EVERY EVENING
Status: DISCONTINUED | OUTPATIENT
Start: 2020-12-13 | End: 2020-12-18 | Stop reason: HOSPADM

## 2020-12-13 RX ORDER — CEFEPIME HYDROCHLORIDE 2 G/50ML
2 INJECTION, SOLUTION INTRAVENOUS EVERY 8 HOURS
Status: DISCONTINUED | OUTPATIENT
Start: 2020-12-13 | End: 2020-12-18 | Stop reason: HOSPADM

## 2020-12-13 RX ORDER — BENZOCAINE/MENTHOL 6 MG-10 MG
LOZENGE MUCOUS MEMBRANE 2 TIMES DAILY
Status: DISCONTINUED | OUTPATIENT
Start: 2020-12-13 | End: 2020-12-15

## 2020-12-13 RX ORDER — ONDANSETRON 4 MG/1
4 TABLET, ORALLY DISINTEGRATING ORAL EVERY 6 HOURS PRN
Status: DISCONTINUED | OUTPATIENT
Start: 2020-12-13 | End: 2020-12-18 | Stop reason: HOSPADM

## 2020-12-13 RX ORDER — LEVETIRACETAM 500 MG/1
500 TABLET ORAL 2 TIMES DAILY
Status: DISCONTINUED | OUTPATIENT
Start: 2020-12-13 | End: 2020-12-18 | Stop reason: HOSPADM

## 2020-12-13 RX ADMIN — LEVETIRACETAM 500 MG: 500 TABLET, FILM COATED ORAL at 19:54

## 2020-12-13 RX ADMIN — CEFEPIME HYDROCHLORIDE 2 G: 2 INJECTION, SOLUTION INTRAVENOUS at 22:33

## 2020-12-13 RX ADMIN — CEFTRIAXONE SODIUM 2 G: 2 INJECTION, SOLUTION INTRAVENOUS at 13:03

## 2020-12-13 RX ADMIN — HYDROCODONE BITARTRATE AND ACETAMINOPHEN 1 TABLET: 5; 325 TABLET ORAL at 15:12

## 2020-12-13 RX ADMIN — CEFEPIME HYDROCHLORIDE 2 G: 2 INJECTION, SOLUTION INTRAVENOUS at 15:12

## 2020-12-13 RX ADMIN — HYDROCODONE BITARTRATE AND ACETAMINOPHEN 1 TABLET: 5; 325 TABLET ORAL at 19:54

## 2020-12-13 RX ADMIN — AZITHROMYCIN 500 MG: 500 INJECTION, POWDER, LYOPHILIZED, FOR SOLUTION INTRAVENOUS at 13:38

## 2020-12-13 RX ADMIN — SIMVASTATIN 20 MG: 10 TABLET, FILM COATED ORAL at 21:40

## 2020-12-13 RX ADMIN — QUETIAPINE 100 MG: 100 TABLET, FILM COATED ORAL at 21:40

## 2020-12-13 ASSESSMENT — ENCOUNTER SYMPTOMS
ACTIVITY CHANGE: 1
VOMITING: 0
LIGHT-HEADEDNESS: 1
SHORTNESS OF BREATH: 1
NAUSEA: 1
HEADACHES: 1
APPETITE CHANGE: 1
SLEEP DISTURBANCE: 0
CHILLS: 1
BACK PAIN: 0
RECTAL PAIN: 1
BLOOD IN STOOL: 1
PHOTOPHOBIA: 0
DIARRHEA: 0
ABDOMINAL PAIN: 1
WEAKNESS: 1
SORE THROAT: 0
NECK PAIN: 0
DIZZINESS: 1
SINUS PRESSURE: 0
FEVER: 1
FATIGUE: 1
NECK STIFFNESS: 0
BRUISES/BLEEDS EASILY: 0
COUGH: 1

## 2020-12-13 ASSESSMENT — ACTIVITIES OF DAILY LIVING (ADL)
ADLS_ACUITY_SCORE: 13
ADLS_ACUITY_SCORE: 13

## 2020-12-13 ASSESSMENT — MIFFLIN-ST. JEOR: SCORE: 1637

## 2020-12-13 NOTE — LETTER
[facility]  [address]  [place] [id number] [phone]    FACSIMILE TRANSMITTAL SHEET    TO: Estela   COMPANY: [employer]  FAX NUMBER: [phone]   PHONE NUMBER: [phone]     FROM: CARMEN Camilo  PHONE: [phone]  DATE: 12/16/20      _____URGENT _____REVIEW ONLY _____PLEASE COMMENT____PLEASE REPLY                                        IF YOU DID NOT RECEIVE THE CORRECT NUMBER OF PAGES OR THE FAX DID NOT COME THROUGH CLEARLY, PLEASE CALL THE SENDER     CONFIDENTIALITY STATEMENT: Confidential information that may accompany this transmission contains protected health information under state and federal law and is legally privileged. This information is intended only for the use of the individual or entity named above and may be used only for carrying out treatment, payment or other healthcare operations. The recipient or person responsible for delivering this information is prohibited by law from disclosing this information without proper authorization to any other party, unless required to do so by law or regulation. If you are not the intended recipient, you are hereby notified that any review, dissemination, distribution, or copying of this message is strictly prohibited. If you have received this communication in error, please destroy the materials and contact us immediately by calling the number listed above. No response indicates that the information was received by the appropriate authorized party

## 2020-12-13 NOTE — ED NOTES
Verbal phone report called to Alex VILCHIS MD in room assessing patient. Pt will be transported to the floor off monitor.

## 2020-12-13 NOTE — ED NOTES
"Pt main complaint is of \"huge\" Hemorids, pain with coughing. Was told by daughter to be seen.  Pt is awake and alert, follows commands. Pain 1/10  "

## 2020-12-13 NOTE — PLAN OF CARE
[facility] Inpatient Admission Note:    Patient admitted to 3214/3214-1 at approximately 1500 via cart accompanied by nurse from emergency room . Report received from ARIAS Estevez in SBAR format at 1450 via telephone. Patient transferred to bed via self.. Patient is alert and oriented X 3, reports pain; rates at 10 on 0-10 scale.  Patient oriented to room, unit, hourly rounding, and plan of care. Explained admission packet and patient handbook with patient bill of rights brochure. Will continue to monitor and document as needed.     Inpatient Nursing criteria listed below was met:    Health care directives status obtained and documented: Yes    Care Everywhere authorization obtained No    MRSA swab completed for patient 65 years and older: N/A    Patient identifies a surrogate decision maker: No If yes, who: Contact Information:     If initial lactic acid >2.0, repeat lactic acid drawn within one hour of arrival to unit: NA. If no, state reason:     Vaccination assessment and education completed: Yes   Vaccinations received prior to admission: Pneumovax yes  Influenza(seasonal)  YES      Clergy visit ordered if patient requests: N/A    Skin issues/needs documented: Yes    Isolation Patient: no Education given, correct sign in place and documentation row added to PCS:  No    Fall Prevention Yes: Care plan updated, education given and documented, sticker and magnet in place: Yes    Care Plan initiated: Yes    Education Documented (including assessment): Yes    Patient has discharge needs : No If yes, please explain:

## 2020-12-13 NOTE — ED NOTES
Spoke with Daughter Lashell, verbal consent from patient to provide update. Update provided to family.  Pt remains awake and alert and will be admitted to the floor. MD here seeing patient.

## 2020-12-13 NOTE — PLAN OF CARE
Pt A&O x4, VSS on RA. C/o pain in left upper back and rectum, prn norco given. Pt up SBA in room. HRR and lungs have coarse crackles. Pt does have a congested cough. BS active, denies any N&V. IV rocephin, azithromax and cefepime in use. Pts hgb was 6.6 on admit, 2 units of PRBCs ordered and the first unit is currently running and pt is tolerating it well. No other significant events. Pt is resting.

## 2020-12-13 NOTE — ED PROVIDER NOTES
History     Chief Complaint   Patient presents with     Chest Wall Pain     Rectal/perineal Pain     The history is provided by the patient.     Cuco Cooper is a 65 year old male who presented to the emergency department via wheelchair for evaluation of approximate 1 month history of fatigue, weakness, cough, dyspnea, rectal pain, and abdominal discomfort.  Reports left-sided anterior chest discomfort with coughing and breathing.  Denies any cough production.  On arrival he did have a fever.  Reported some intermittent chills but no fevers at home.  Reports intermittent hematochezia as well.  He does smoke.  History of CLL. no significant headaches.  No known Covid exposure.  Denies any lower urinary tract symptoms.    Allergies:  Allergies   Allergen Reactions     Blood Transfusion Related (Informational Only) Other (See Comments)     Patient has a history of a warm autoantibody.  A delay in compatible RBCs may occur.     Sulfa Drugs        Problem List:    Patient Active Problem List    Diagnosis Date Noted     Seizure disorder (H) 10/29/2015     Priority: High     Class: Acute     Pneumonia, community acquired 10/29/2015     Priority: High     Class: Acute     Anemia 12/13/2020     Priority: Medium     CLL (chronic lymphocytic leukemia) (H) 12/13/2020     Priority: Medium     Community acquired bacterial pneumonia 12/13/2020     Priority: Medium     At high risk for infection due to chemotherapy 12/13/2020     Priority: Medium     Diabetes mellitus, type II (H) 10/29/2015     Priority: Medium     Elevated brain natriuretic peptide (BNP) level 10/29/2015     Priority: Medium        Past Medical History:    Past Medical History:   Diagnosis Date     Asthma      Bipolar affective disorder (H)      Cannabis abuse      Chronic lymphatic leukemia (H)      DM (diabetes mellitus) (H)      Dyslipidemia      Hematemesis      HTN (hypertension)      Seizure disorder (H)      Seizures (H)      Status epilepticus (H)       TBI (traumatic brain injury) (H)        Past Surgical History:    Past Surgical History:   Procedure Laterality Date     C REPAIR SHOULDER CAPSULE,ANTERIOR      Left     C TOTAL HIP ARTHROPLASTY      Right     FOREARM SURGERY      Left     TONSILLECTOMY         Family History:    Family History   Problem Relation Age of Onset     Other Cancer Mother        Social History:  Marital Status:   [4]  Social History     Tobacco Use     Smoking status: Current Every Day Smoker     Packs/day: 1.00     Types: Cigarettes     Smokeless tobacco: Never Used   Substance Use Topics     Alcohol use: No     Alcohol/week: 0.0 standard drinks     Drug use: Yes     Types: Marijuana     Comment: marijuana        Medications:         ARIPiprazole (ABILIFY PO)       Citalopram Hydrobromide (CELEXA PO)       Divalproex Sodium (DEPAKOTE ER PO)       FOLIC ACID PO       FUROSEMIDE PO       HYDROcodone-acetaminophen (NORCO) 5-325 MG tablet       LevETIRAcetam (KEPPRA PO)       lisinopril (PRINIVIL,ZESTRIL) 20 MG tablet       naproxen (NAPROSYN) 500 MG tablet       OMEPRAZOLE PO       Pioglitazone HCl (ACTOS PO)       QUEtiapine Fumarate (SEROQUEL PO)       Simvastatin (ZOCOR PO)          Review of Systems   Constitutional: Positive for activity change, appetite change, chills, fatigue and fever.   HENT: Negative for congestion, sinus pressure and sore throat.    Eyes: Negative for photophobia.   Respiratory: Positive for cough and shortness of breath.    Cardiovascular: Positive for chest pain.   Gastrointestinal: Positive for abdominal pain, blood in stool, nausea and rectal pain. Negative for diarrhea and vomiting.   Genitourinary: Negative.    Musculoskeletal: Negative for back pain, neck pain and neck stiffness.   Skin: Negative.    Allergic/Immunologic: Positive for immunocompromised state.   Neurological: Positive for dizziness, weakness, light-headedness and headaches.   Hematological: Does not bruise/bleed easily.    Psychiatric/Behavioral: Negative for sleep disturbance.       Physical Exam   BP: 140/87  Pulse: 98  Temp: (!) 101.5  F (38.6  C)  Resp: 16  Weight: 86.2 kg (190 lb)  SpO2: 95 %      Physical Exam  Vitals signs and nursing note reviewed.   Constitutional:       General: He is not in acute distress.     Appearance: Normal appearance. He is normal weight. He is ill-appearing. He is not toxic-appearing or diaphoretic.   Cardiovascular:      Rate and Rhythm: Normal rate and regular rhythm.   Pulmonary:      Effort: Pulmonary effort is normal.      Comments: Bilateral expiratory rhonchi.  Chest:      Chest wall: No tenderness.   Abdominal:      General: There is no distension.      Palpations: Abdomen is soft.      Tenderness: There is no guarding.      Comments: Reports generalized mild tenderness upon deep palpation.  There is no focality.  There is no guarding.  There is no distention.  There is no evidence of acute surgical abdomen.   Musculoskeletal:      Right lower leg: No edema.      Left lower leg: No edema.   Skin:     General: Skin is warm and dry.      Capillary Refill: Capillary refill takes less than 2 seconds.   Neurological:      General: No focal deficit present.      Mental Status: He is alert and oriented to person, place, and time.   Psychiatric:         Mood and Affect: Mood normal.         ED Course     ED Course as of Dec 13 1333   Sun Dec 13, 2020   1224 Hemoglobin 6.        Procedures               Critical Care time:  none               Results for orders placed or performed during the hospital encounter of 12/13/20 (from the past 24 hour(s))   CBC with platelets differential   Result Value Ref Range    WBC 38.0 (H) 4.0 - 11.0 10e9/L    RBC Count 2.11 (L) 4.4 - 5.9 10e12/L    Hemoglobin 6.6 (LL) 13.3 - 17.7 g/dL    Hematocrit 22.5 (L) 40.0 - 53.0 %     (H) 78 - 100 fl    MCH 31.3 26.5 - 33.0 pg    MCHC 29.3 (L) 31.5 - 36.5 g/dL    RDW 18.2 (H) 10.0 - 15.0 %    Platelet Count 445 150 -  450 10e9/L    Diff Method Manual Differential     % Neutrophils 8.0 %    % Lymphocytes 92.0 %    % Monocytes 0.0 %    % Eosinophils 0.0 %    % Basophils 0.0 %    Absolute Neutrophil 3.0 1.6 - 8.3 10e9/L    Absolute Lymphocytes 35.0 (H) 0.8 - 5.3 10e9/L    Absolute Monocytes 0.0 0.0 - 1.3 10e9/L    Absolute Eosinophils 0.0 0.0 - 0.7 10e9/L    Absolute Basophils 0.0 0.0 - 0.2 10e9/L    Anisocytosis Slight     RBC Morphology Consistent with reported results     Platelet Estimate       Automated count confirmed.  Platelet morphology is normal.   Comprehensive metabolic panel   Result Value Ref Range    Sodium 138 133 - 144 mmol/L    Potassium 3.8 3.4 - 5.3 mmol/L    Chloride 104 94 - 109 mmol/L    Carbon Dioxide 26 20 - 32 mmol/L    Anion Gap 8 3 - 14 mmol/L    Glucose 141 (H) 70 - 99 mg/dL    Urea Nitrogen 17 7 - 30 mg/dL    Creatinine 0.89 0.66 - 1.25 mg/dL    GFR Estimate 89 >60 mL/min/[1.73_m2]    GFR Estimate If Black >90 >60 mL/min/[1.73_m2]    Calcium 8.3 (L) 8.5 - 10.1 mg/dL    Bilirubin Total 0.5 0.2 - 1.3 mg/dL    Albumin 2.1 (L) 3.4 - 5.0 g/dL    Protein Total 7.1 6.8 - 8.8 g/dL    Alkaline Phosphatase 235 (H) 40 - 150 U/L    ALT 21 0 - 70 U/L    AST 40 0 - 45 U/L   Lactic acid whole blood   Result Value Ref Range    Lactic Acid 1.9 0.7 - 2.0 mmol/L   Procalcitonin   Result Value Ref Range    Procalcitonin 0.08 ng/ml   ABO/Rh type and screen   Result Value Ref Range    ABO O     RH(D) Pos     Antibody Screen Neg     Test Valid Only At [facility]        Specimen Expires 12/16/2020    XR Chest Port 1 View    Narrative    PROCEDURE:  XR CHEST PORT 1 VW    HISTORY: chest pain and fever. .    COMPARISON:  10/31/2015    FINDINGS:    The cardiomediastinal contours are partially obscured. There is  calcific aortic atherosclerosis.   There is new consolidative opacity at the left lung base associated  with some pleural fluid. Mild patchy right medial base opacity is also  questioned. No pneumothorax.       Impression    IMPRESSION:  Left pleural effusion. Left greater than right base  pulmonary opacities. Left lower lobe/multifocal pneumonia with a left  parapneumonic effusion is in the differential. Recommend follow-up.      CELESTE TURK MD   Symptomatic Influenza A/B & SARS-CoV2 (COVID-19) Virus PCR Multiplex    Specimen: Nasopharyngeal   Result Value Ref Range    Flu A/B & SARS-COV-2 PCR Source Nasopharyngeal     SARS-CoV-2 PCR Result NEGATIVE     Influenza A PCR Negative NEG^Negative    Influenza B PCR Negative NEG^Negative    Respiratory Syncytial Virus PCR Negative NEG^Negative    Flu A/B & SARS-CoV-2 PCR Comment       Testing was performed using the Xpert Xpress SARS-CoV2/Flu/RSV Assay on the [employer]   GeneXpert Instrument. Additional information about the Emergency Use Authorization (EUA)   assay can be found via the Lab Guide.     Blood culture    Specimen: Blood   Result Value Ref Range    Specimen Description Blood     Special Requests FULL AER BOTTLE, 1 ML IN IRAM BOTTLE     Culture Micro PENDING        Medications   azithromycin (ZITHROMAX) 500 mg in sodium chloride 0.9 % 250 mL intermittent infusion (has no administration in time range)   cefTRIAXone IN D5W (ROCEPHIN) intermittent infusion 2 g (2 g Intravenous New Bag 12/13/20 1303)       Assessments & Plan (with Medical Decision Making)   Findings as above.  Current oral chemotherapy for CLL.  White count has drastically reduced from April of this year.  Hemoglobin 6.6.  Fever, weakness, and fatigue.  Chest x-ray consistent with focal findings of possible effusion versus pneumonia.  He does have left-sided chest discomfort, fever, and respiratory symptoms.  This would obviously argue towards infectious process.  He would fit any reasonable indication for admission.  Started on Rocephin and azithromycin after blood cultures.  COVID-19 negative.  Patient was discussed with and graciously accepted by Dr. Markham.     This document was prepared using  a combination of typing and voice generated software.  While every attempt was made for accuracy, spelling and grammatical errors may exist.    I have reviewed the nursing notes.    I have reviewed the findings, diagnosis, plan and need for follow up with the patient.       New Prescriptions    No medications on file       Final diagnoses:   Community acquired bacterial pneumonia   Anemia   CLL (chronic lymphocytic leukemia) (H)   At high risk for infection due to chemotherapy       12/13/2020   [facility]     Christiane Holm PA-C  12/13/20 [id number]

## 2020-12-13 NOTE — H&P
[facility]    History and Physical  Hospitalist       Date of Admission:  12/13/2020  Date of Service (when I saw the patient): 12/13/20    Assessment & Plan   Cuco Cooper is a 65 year old male with history of CLL currently on ibrutinib who presents with fatigue, weakness, cough, and fever.  Chest x-ray suggests left lower lobe pneumonia.    Community-acquired pneumonia: Complicated by immunocompromise, patient is on ibrutinib for CLL.  WBC has historically been as high as 200 K, ibrutinib started earlier 2020, patient's leukocytosis has decreased, now 38K.  Very likely dysfunctional blood cells however, resulting in immunocompromise.  -We will treat with cefepime as well as azithromycin  -Blood cultures pending  -Patient does not have respiratory failure currently, satting high 90s on room air    Anemia: Complicated by CLL and ibrutinib.  Most likely related to bone marrow suppression by either/both of these entities.  Hemoglobin on 10/27/2020 was 8.7.  Currently hemoglobin 6.6, no history of clinical blood loss.  -We will transfuse 2 units packed red blood cells    CLL: Currently on ibrutinib via Dr. Sarah with [facility] hematology/oncology.  -Stable, currently no issues    Hypertension: BP and heart rate currently within normal limits and stable  -Home medications include Lasix 20 mg daily, lisinopril 20 mg, will continue as able    Seizure disorder: Patient states he has not had a seizure in a long time.  -Continue outpatient     Non-insulin-dependent diabetes mellitus type 2: Patient is on Actos.  -Continue as able  -Sliding scale coverage as needed    Bipolar disorder: Patient is on Abilify, Celexa, and Seroquel  -Continue psych meds as able    DVT Prophylaxis: Pneumatic Compression Devices    Code Status: DNR    Disposition: Expected discharge in 2-3 days once clinically improved.    Sean Markham MD    Primary Care Physician   Alexi Unger        Chief Complaint   Pleuritic chest pain,  weakness, fatigue, rectal pain    History is obtained from the patient    History of Present Illness   Cuco Cooper is a 65 year old male with history of CLL currently on ibrutinib via Dr. Sarah with [facility], seizure disorder on Keppra, non-insulin-dependent diabetes mellitus, bipolar disorder who presents with left-sided pleuritic chest pain, occasional coughing, weakness and fatigue that has been progressive for about a month.  He also complains of rectal pain associated with hemorrhoid.  He does complain of chills, and he had a measured fever of 101.5 in the emergency department.  His chest pain is worsened with coughing, however his cough has been nonproductive.  He denies any sick contacts.  He lives with daughter and son-in-law, both of which are healthy.  Chest x-ray in emergency department shows left-sided infiltrate with associated small effusion.  No evidence of hypoxia however.  COVID-19 swab is negative, as well as influenza a, B, and RSV.  Patient is also anemic at 6.6, last hemoglobin available in care everywhere from October is greater than 8.  No history of blood loss.  With history of CLL that is active, patient will be admitted for likely pneumonia in the setting of immunocompromise.      Past Medical History    I have reviewed this patient's medical history and updated it with pertinent information if needed.   Past Medical History:   Diagnosis Date     Asthma      Bipolar affective disorder (H)      Cannabis abuse      Chronic lymphatic leukemia (H)      DM (diabetes mellitus) (H)      Dyslipidemia      Hematemesis      HTN (hypertension)      Seizure disorder (H)      Seizures (H)      Status epilepticus (H)      TBI (traumatic brain injury) (H)        Past Surgical History   I have reviewed this patient's surgical history and updated it with pertinent information if needed.  Past Surgical History:   Procedure Laterality Date     C REPAIR SHOULDER CAPSULE,ANTERIOR      Left     C TOTAL HIP  ARTHROPLASTY      Right     FOREARM SURGERY      Left     TONSILLECTOMY         Prior to Admission Medications   Prior to Admission Medications   Prescriptions Last Dose Informant Patient Reported? Taking?   ARIPiprazole (ABILIFY PO)   Yes No   Sig: Take 5 mg by mouth daily   Citalopram Hydrobromide (CELEXA PO)   Yes No   Sig: Take 60 mg by mouth daily.   Divalproex Sodium (DEPAKOTE ER PO)   Yes No   Sig: Take 1,000 mg by mouth Last rx filled in July 2105   FOLIC ACID PO   Yes No   Sig: Take 1 mg by mouth daily   FUROSEMIDE PO   Yes No   Sig: Take 20 mg by mouth daily   HYDROcodone-acetaminophen (NORCO) 5-325 MG tablet   No No   Sig: Take 1 tablet by mouth every 4 hours as needed for pain   LevETIRAcetam (KEPPRA PO)   Yes No   Sig: Take 500 mg by mouth 2 times daily   OMEPRAZOLE PO   Yes No   Sig: Take 20 mg by mouth every morning   Pioglitazone HCl (ACTOS PO)   Yes No   Sig: Take 15 mg by mouth daily.   QUEtiapine Fumarate (SEROQUEL PO)   Yes No   Sig: Take 100 mg by mouth At Bedtime.   Simvastatin (ZOCOR PO)   Yes No   Sig: Take 20 mg by mouth every evening    lisinopril (PRINIVIL,ZESTRIL) 20 MG tablet   No No   Sig: Take 1 tablet by mouth daily.   naproxen (NAPROSYN) 500 MG tablet   No No   Sig: Take 1 tablet (500 mg) by mouth 2 times daily (with meals)      Facility-Administered Medications: None     Allergies   Allergies   Allergen Reactions     Blood Transfusion Related (Informational Only) Other (See Comments)     Patient has a history of a warm autoantibody.  A delay in compatible RBCs may occur.     Sulfa Drugs        Social History   I have reviewed this patient's social history and updated it with pertinent information if needed. Cuco Cooper  reports that he has been smoking cigarettes. He has been smoking about 1.00 pack per day. He has never used smokeless tobacco. He reports current drug use. Drug: Marijuana. He reports that he does not drink alcohol.    Family History   I have reviewed this  patient's family history and updated it with pertinent information if needed.   Family History   Problem Relation Age of Onset     Other Cancer Mother        Review of Systems   The 10 point Review of Systems is negative other than noted in the HPI or here.     Physical Exam   Temp: (!) 101.5  F (38.6  C) Temp src: Tympanic BP: 118/71 Pulse: 73   Resp: 12 SpO2: 99 % O2 Device: None (Room air)    Vital Signs with Ranges  Temp:  [101.5  F (38.6  C)] 101.5  F (38.6  C)  Pulse:  [73-98] 73  Resp:  [11-18] 12  BP: (116-140)/(65-87) 118/71  SpO2:  [95 %-99 %] 99 %  190 lbs 0 oz    Constitutional: AA, NAD, disheveled, unkempt  Eyes: PERRLA, no injection, no icterus  HEENT: atraumatic, normocephalic  Respiratory: relatively CTA b/l  Cardiovascular: S1 S2 RRR  GI: soft, NT, ND, + bowel sounds  Lymph/Hematologic: no palpable lymphadenopathy  Skin: no rashes, no lesions  Musculoskeletal: No edema, good tone, no deformities  Neurologic: oriented x 3, no focal deficits  Psychiatric: bizarre affect    Data   Data reviewed today:  I personally reviewed imaging reports.  Recent Labs   Lab 12/13/20  1205   WBC 38.0*   HGB 6.6*   *         POTASSIUM 3.8   CHLORIDE 104   CO2 26   BUN 17   CR 0.89   ANIONGAP 8   IVON 8.3*   *   ALBUMIN 2.1*   PROTTOTAL 7.1   BILITOTAL 0.5   ALKPHOS 235*   ALT 21   AST 40     Lactic Acid   Date Value Ref Range Status   12/13/2020 1.9 0.7 - 2.0 mmol/L Final   10/31/2015 1.1 0.4 - 2.0 mmol/L Final   10/29/2015 4.4 (H) 0.4 - 2.0 mmol/L Final       Recent Results (from the past 24 hour(s))   XR Chest Port 1 View    Narrative    PROCEDURE:  XR CHEST PORT 1 VW    HISTORY: chest pain and fever. .    COMPARISON:  10/31/2015    FINDINGS:    The cardiomediastinal contours are partially obscured. There is  calcific aortic atherosclerosis.   There is new consolidative opacity at the left lung base associated  with some pleural fluid. Mild patchy right medial base opacity is  also  questioned. No pneumothorax.      Impression    IMPRESSION:  Left pleural effusion. Left greater than right base  pulmonary opacities. Left lower lobe/multifocal pneumonia with a left  parapneumonic effusion is in the differential. Recommend follow-up.      CELESTE TURK MD

## 2020-12-14 ENCOUNTER — APPOINTMENT (OUTPATIENT)
Dept: OCCUPATIONAL THERAPY | Facility: HOSPITAL | Age: 65
DRG: 840 | End: 2020-12-14
Attending: INTERNAL MEDICINE
Payer: MEDICARE

## 2020-12-14 ENCOUNTER — APPOINTMENT (OUTPATIENT)
Dept: PHYSICAL THERAPY | Facility: HOSPITAL | Age: 65
DRG: 840 | End: 2020-12-14
Attending: INTERNAL MEDICINE
Payer: MEDICARE

## 2020-12-14 LAB
ANION GAP SERPL CALCULATED.3IONS-SCNC: 5 MMOL/L (ref 3–14)
BUN SERPL-MCNC: 17 MG/DL (ref 7–30)
CALCIUM SERPL-MCNC: 7.8 MG/DL (ref 8.5–10.1)
CHLORIDE SERPL-SCNC: 109 MMOL/L (ref 94–109)
CO2 SERPL-SCNC: 26 MMOL/L (ref 20–32)
CREAT SERPL-MCNC: 0.73 MG/DL (ref 0.66–1.25)
ERYTHROCYTE [DISTWIDTH] IN BLOOD BY AUTOMATED COUNT: 19.8 % (ref 10–15)
GFR SERPL CREATININE-BSD FRML MDRD: >90 ML/MIN/{1.73_M2}
GLUCOSE BLDC GLUCOMTR-MCNC: 100 MG/DL (ref 70–99)
GLUCOSE BLDC GLUCOMTR-MCNC: 115 MG/DL (ref 70–99)
GLUCOSE BLDC GLUCOMTR-MCNC: 95 MG/DL (ref 70–99)
GLUCOSE SERPL-MCNC: 121 MG/DL (ref 70–99)
HCT VFR BLD AUTO: 25.7 % (ref 40–53)
HGB BLD-MCNC: 7.5 G/DL (ref 13.3–17.7)
HGB BLD-MCNC: 7.9 G/DL (ref 13.3–17.7)
LACTATE BLD-SCNC: 0.7 MMOL/L (ref 0.7–2)
MCH RBC QN AUTO: 30.9 PG (ref 26.5–33)
MCHC RBC AUTO-ENTMCNC: 30.7 G/DL (ref 31.5–36.5)
MCV RBC AUTO: 100 FL (ref 78–100)
PLATELET # BLD AUTO: 286 10E9/L (ref 150–450)
POTASSIUM SERPL-SCNC: 3.8 MMOL/L (ref 3.4–5.3)
RBC # BLD AUTO: 2.56 10E12/L (ref 4.4–5.9)
SODIUM SERPL-SCNC: 140 MMOL/L (ref 133–144)
WBC # BLD AUTO: 24.3 10E9/L (ref 4–11)

## 2020-12-14 PROCEDURE — 83605 ASSAY OF LACTIC ACID: CPT | Performed by: INTERNAL MEDICINE

## 2020-12-14 PROCEDURE — 85027 COMPLETE CBC AUTOMATED: CPT | Performed by: INTERNAL MEDICINE

## 2020-12-14 PROCEDURE — 36415 COLL VENOUS BLD VENIPUNCTURE: CPT | Performed by: INTERNAL MEDICINE

## 2020-12-14 PROCEDURE — 97166 OT EVAL MOD COMPLEX 45 MIN: CPT | Mod: GO

## 2020-12-14 PROCEDURE — 258N000003 HC RX IP 258 OP 636: Performed by: INTERNAL MEDICINE

## 2020-12-14 PROCEDURE — 99232 SBSQ HOSP IP/OBS MODERATE 35: CPT | Performed by: INTERNAL MEDICINE

## 2020-12-14 PROCEDURE — 250N000013 HC RX MED GY IP 250 OP 250 PS 637: Performed by: INTERNAL MEDICINE

## 2020-12-14 PROCEDURE — 999N001017 HC STATISTIC GLUCOSE BY METER IP

## 2020-12-14 PROCEDURE — 120N000001 HC R&B MED SURG/OB

## 2020-12-14 PROCEDURE — 250N000011 HC RX IP 250 OP 636: Performed by: INTERNAL MEDICINE

## 2020-12-14 PROCEDURE — 97161 PT EVAL LOW COMPLEX 20 MIN: CPT | Mod: GP | Performed by: PHYSICAL THERAPIST

## 2020-12-14 PROCEDURE — 97530 THERAPEUTIC ACTIVITIES: CPT | Mod: GP | Performed by: PHYSICAL THERAPIST

## 2020-12-14 PROCEDURE — 80048 BASIC METABOLIC PNL TOTAL CA: CPT | Performed by: INTERNAL MEDICINE

## 2020-12-14 PROCEDURE — 85018 HEMOGLOBIN: CPT | Performed by: INTERNAL MEDICINE

## 2020-12-14 RX ORDER — ALBUTEROL SULFATE 0.83 MG/ML
2.5 SOLUTION RESPIRATORY (INHALATION) EVERY 4 HOURS PRN
COMMUNITY

## 2020-12-14 RX ORDER — ACETAMINOPHEN 325 MG/1
650 TABLET ORAL EVERY 4 HOURS PRN
Status: DISCONTINUED | OUTPATIENT
Start: 2020-12-14 | End: 2020-12-18 | Stop reason: HOSPADM

## 2020-12-14 RX ORDER — LISINOPRIL 20 MG/1
20 TABLET ORAL DAILY
Status: ON HOLD | COMMUNITY
Start: 2020-08-15 | End: 2020-12-14

## 2020-12-14 RX ORDER — PROCHLORPERAZINE MALEATE 10 MG
10 TABLET ORAL EVERY 6 HOURS PRN
COMMUNITY
Start: 2020-01-27

## 2020-12-14 RX ADMIN — OMEPRAZOLE 20 MG: 20 CAPSULE, DELAYED RELEASE ORAL at 10:22

## 2020-12-14 RX ADMIN — PIOGLITAZONE 15 MG: 15 TABLET ORAL at 10:22

## 2020-12-14 RX ADMIN — CEFEPIME HYDROCHLORIDE 2 G: 2 INJECTION, SOLUTION INTRAVENOUS at 16:56

## 2020-12-14 RX ADMIN — HYDROCODONE BITARTRATE AND ACETAMINOPHEN 1 TABLET: 5; 325 TABLET ORAL at 03:24

## 2020-12-14 RX ADMIN — CEFEPIME HYDROCHLORIDE 2 G: 2 INJECTION, SOLUTION INTRAVENOUS at 06:43

## 2020-12-14 RX ADMIN — LEVETIRACETAM 500 MG: 500 TABLET, FILM COATED ORAL at 21:21

## 2020-12-14 RX ADMIN — SIMVASTATIN 20 MG: 10 TABLET, FILM COATED ORAL at 21:21

## 2020-12-14 RX ADMIN — AZITHROMYCIN 250 MG: 500 INJECTION, POWDER, LYOPHILIZED, FOR SOLUTION INTRAVENOUS at 14:51

## 2020-12-14 RX ADMIN — QUETIAPINE 100 MG: 100 TABLET, FILM COATED ORAL at 21:21

## 2020-12-14 RX ADMIN — LEVETIRACETAM 500 MG: 500 TABLET, FILM COATED ORAL at 10:22

## 2020-12-14 RX ADMIN — CITALOPRAM HYDROBROMIDE 60 MG: 20 TABLET ORAL at 10:21

## 2020-12-14 ASSESSMENT — ACTIVITIES OF DAILY LIVING (ADL)
ADLS_ACUITY_SCORE: 13
ADLS_ACUITY_SCORE: 16

## 2020-12-14 NOTE — PLAN OF CARE
Spoke with daughter who read off all medications from their pill bottles. Deleted lisinopril which was listed as active on his [facility] medlist, but per daughter it was stopped due to low blood pressure by Dr. Sarah which I did find in chart review and removed it from his PTA medlist (not ordered)    Discontinued Medications  - documented as of this encounter  Medication Sig Discontinue Reason Start Date End Date   lisinopril (PRINIVIL, ZESTRIL) 20 MG tablet   Take 1 Tab by mouth one time a day.   03/20/2013 10/27/2020     She stated she isn't sure if he took yesterday's doses at home so I marked all last times as past week.     No other concerns/issues.     Bhavna Jacobs on 12/14/2020 at 3:06 PM

## 2020-12-14 NOTE — PROGRESS NOTES
Inpatient Occupational Therapy Evaluation    Name: Cuco Cooper MRN# [id number]   Age: 65 year old YOB: 1955     Date of Consultation: 2020  Consultation is requested by: Dr. Markham  Specific Consultation Request: weakness  Primary care provider: Alexi Unger    General Information:   Onset Date: 2020    History of Current Problem/Admission: Anemia [D64.9]  CLL (chronic lymphocytic leukemia) (H) [C91.10]  Community acquired bacterial pneumonia [J15.9]  At high risk for infection due to chemotherapy [Z91.89, Z92.21]    Prior Level of Function: Pt previously independent in ADLs, states he gets occasional assist from daughter for LB dressing.  Ambulation: 0 - Independent   Transferrin - Independent   Toiletin - Independent    Bathin - Independent   Dressin - Independent   Eatin - Independent   Communication: 0 - Understands/communicates without difficulty  Swallowin - swallows foods/liquids without difficulty  Cognition: 0 - no cognitive issues reported    Fall history within the last 6 months: No    Current Living Situation: Pt lives with his daughter, son-in-law, their baby, and a friend named Gagan. 0 steps to enter home, 0 steps inside home. Bathroom has a shower/tub combo, regular toilet, no grab bars.     Current Equipment Used at Home: none     Patient & Family Goals: return home     Past Medical History:   Past Medical History:   Diagnosis Date     Asthma      Bipolar affective disorder (H)      Cannabis abuse      Chronic lymphatic leukemia (H)      DM (diabetes mellitus) (H)      Dyslipidemia      Hematemesis      HTN (hypertension)      Seizure disorder (H)      Seizures (H)      Status epilepticus (H)      TBI (traumatic brain injury) (H)        Past Surgical History:  Past Surgical History:   Procedure Laterality Date     C REPAIR SHOULDER CAPSULE,ANTERIOR      Left     C TOTAL HIP ARTHROPLASTY      Right     FOREARM SURGERY      Left      TONSILLECTOMY         Medications:   Current Facility-Administered Medications   Medication     azithromycin (ZITHROMAX) 250 mg in sodium chloride 0.9 % 250 mL intermittent infusion     ceFEPIme (MAXIPIME) 2-5 GM-%(50ML) intermittent infusion 2 g     citalopram (celeXA) tablet 60 mg     hydrocortisone (CORTAID) 1 % cream     levETIRAcetam (KEPPRA) tablet 500 mg     lidocaine (LMX4) kit     lidocaine 1 % 0.1-1 mL     melatonin tablet 1 mg     naloxone (NARCAN) injection 0.2 mg    Or     naloxone (NARCAN) injection 0.4 mg    Or     naloxone (NARCAN) injection 0.2 mg    Or     naloxone (NARCAN) injection 0.4 mg     omeprazole (priLOSEC) CR capsule 20 mg     ondansetron (ZOFRAN-ODT) ODT tab 4 mg    Or     ondansetron (ZOFRAN) injection 4 mg     pioglitazone (ACTOS) tablet 15 mg     polyethylene glycol (MIRALAX) Packet 17 g     QUEtiapine (SEROquel) tablet 100 mg     simvastatin (ZOCOR) tablet 20 mg     sodium chloride (PF) 0.9% PF flush 3 mL     sodium chloride (PF) 0.9% PF flush 3 mL       Weight Bearing Status: N/A     Cognitive Status Examination:  Orientation: lethargic  Level of Consciousness: became more alert as session progressed  Follows Commands and Answers Questions: 50% of the time  Personal Safety and Judgement: Impulsive  Memory: Difficult assessing. Pt very lethargic at start of eval needing most questions repeated. Unknown if this was cognition related or just being tired    Pain:   Currently in pain? No    Occupational Therapy Evaluation:   Integumentary/Edema: WNL  Range of Motion: BUE's WFL   Strength: Unable to perform MMT due to impulsiveness   Muscle Tone Assessment: no issues observed  Coordination: NT    Mobility:   Bed Mobility: Supine to sit SBA  Transfer Skills: SBA sit to stand from EOB  Toilet Transfer: SBA-CGA due to extremely low height of toilet   Balance: Fair without support. Pt ambulated into the BR using FWW with cues and SBA-CGA. Pt did not use walker when he left BR     ADLs:    Lower Body Dressing: SBA to Mod (I) donning socks  Toileting: Independent urinating, but CGA when standing to pull up underpants   Grooming: Pt did not stop at sink to wash his h-ands    Pt on RA, SpO2 after transferring supine to sit 88-90%. Then after using the BR, 90-91%.    IADLs:   Previous Responsibilities of the Patient: Medication Management, Finances  and Driving   Comments: Family/friends completes the remaining IADLs     Activities of Daily Living Analysis:   Impairments Contributing to Impaired Activities of Daily Living: Balance impaired , Cognition impaired , strength decreased and activity tolerance decreased    Occupational Therapy Interventions: ADL Retraining , cognition , strengthening , progressive activity/exercise and risk factor education     Clinical Impressions:  Criteria for Skilled Therapeutic Intervention Met: Yes, treatment indicated  OT Diagnosis: Deficits with ADLs  Influenced by the following impairments: impaired balance, decreased cognition/impulsive, weakness, deconditioning  Functional limitations due to impairment: Decreased safety with ADLs  Clinical presentation: Evolving/Changing  Clinical presentation rationale: Clinical judgement  Clinical Decision making (complexity): Moderate Complexity  Frequency: 5 times/week  Predicted Duration of Therapy Intervention (days/wks): 5 days    Anticipated Discharge Disposition: Home with Assist and Home with Home Therapy  Anticipated Equipment Needs at Discharge: tbd  Risks and Benefits of therapy have been explained: Yes  Patient, Family & other staff in agreement with plan of care: Yes  Clinical Impression Comments: Pt very impulsive but mainly required SBA for ADLs/functional mobility. He needs encouragement to get out of bed and sit in the chair. He does demonstrate decreased endurance/activity tolerance. Pt likely able to return home with assist and possible home OT depending on progress during hospital stay. Plan to treat pt and  will continue making discharge recommendations.     Total Eval Time: 20

## 2020-12-14 NOTE — PROGRESS NOTES
12/14/20 7078   Signing Clinician's Name / Credentials   Signing clinician's name / credentials Bev Tomas DPLISA   Quick Adds   Rehab Discipline PT   Therapeutic Activity   Minutes of Treatment 12 minutes   Symptoms Noted During/After Treatment Fatigue;Shortness of breath   Treatment Detail After brief attempt to urinate, pt impulsively transferred up off of toilet and ambulated without AD back to chair in room, significant dyspnea noted.  After approx 1 minute, pt impulsively stood back up and ambulated back into bathroom, pt straining to try to have BM.  Pt unsuccessfull and again ambulated back to room chair due to dyspnea, all mobility completed with CGA -SBA for safety, when asked about going for additional walk pt refused due to fatigue.  Pt wanting to get back to bed but encouraged to stay up, discussed it being good for his lungs.  Clip alarm on and call light in place.   PT Discharge Planning    PT Discharge Recommendation (DC Rec) home with home care physical therapy;home with outpatient physical therapy   PT Rationale for DC Rec Pt demonstrates impulsivity during evaluation and assessment limited due to his frequent trips to bathroom but not able to tolerate further once finished.  Pt appears relatively steady on feet with assistive device and some increased instability without however assessment limited to short distance to/from bathroom.  Will need further assessment but anticipate home with home PT vs outpatient PT may be appropriate, may require an assistive device for safety upon discharge if pt agreeable.     PT Brief overview of current status  After brief attempt to urinate, pt impulsively transferred up off of toilet and ambulated without AD back to chair in room, significant dyspnea noted.  After approx 1 minute, pt impulsively stood back up and ambulated back into bathroom, pt straining to try to have BM.  Pt unsuccessfull and again ambulated back to room chair due to dyspnea, all mobility  completed with CGA -SBA for safety, when asked about going for additional walk pt refused due to fatigue.  Pt wanting to get back to bed but encouraged to stay up, discussed it being good for his lungs.  Clip alarm on and call light in place.   Additional Documentation   Rehab Comments impulsive, limited assessment due to frequent need to use bathroom   PT Plan Further assess mobility outside of room   Total Session Time   Total Session Time (minutes) 12 minutes

## 2020-12-14 NOTE — PLAN OF CARE
Prior to Admission Medication Reconciliation:     Medications added:   [] None  [x] As listed below:    albuterol    Medications deleted:   [] None  [x] As listed below:    Folic acid    Furosemide    omeprazole- ordered    abilify- ordered    norco- ordered    naproxen    Changes made to existing medications:   [] None  [x] As listed below: per [facility] and pharmacy records    Citalopram from 60 mg (ordered) to 40 mg    Divalproex er from 750 mg BID to 1000 mg BID    Quetiapine from 100 mg (ordered) to 200 mg    Last times/dates taken verified with patient:  [] Yes- completed myself  [] Nurse completed no changes made  [x] Unable to review with patient:  [] Nurse completed/changes made:     Allergy modifications:    [x]Did not review  []Patient verified NKA  []Patient verified current existing allergies: no changes made  []New allergies: listed below    Medication reconciliation sources:   []Patient  []Patient family member/emergency contact: **  [x][facility] Report Review:  [x]Albuterol sulfate 2.5 mg q4h prn   [x]Citalopram 40 mg daily  [x]Divalproex er 500 mg 1000 mg BID  [x]levetiracetam 500 mg q12h   [x]Lisinopril 20 mg daily   [x]Pioglitazone 15 mg daily   [x]Quetiapine 200 mg daily   [x]Simvastatin 20 mg qpm     []Epic Chart Review  [x]Care Everywhere review:  Medication Sig Dispensed Refills Start Date End Date Status   simvastatin (ZOCOR) 20 MG tablet   TAKE ONE TABLET BY MOUTH EVERY DAY 30 Tab   11 09/18/2012   Active   citalopram (CELEXA) 40 MG tablet   Take 1.5 Tabs by mouth one time a day. 135 Tab   1 11/26/2012   Active   divalproex ER (DEPAKOTE-ER) 250 MG 24 hour tablet   TAKE THREE TABLETS BY MOUTH TWICE DAILY-SWALLOW WHOLE, DO NOT CRUSH OR CHEW 180 Tab   10 01/02/2013   Active   QUEtiapine (SEROQUEL) 100 MG tablet   Take 1 Tab by mouth at bedtime. 90 Tab   1 04/01/2013   Active   ONE TOUCH ULTRA TEST STRIPS strip   USE ONE STRIP EVERY  Strip   1 05/25/2013   Active   levETIRAcetam (KEPPRA)  500 MG tablet   Take 1 Tab by mouth two times a day. Do not crush.   0 02/03/2015   Active   pioglitazone (ACTOS) 15 MG tablet   Take 1 Tab by mouth one time a day.   0 10/19/2015   Active   aspirin EC 81 MG tablet   Take 1 Tab by mouth one time a day. Do not split or crush. 100 Tab   0 01/30/2018   Active   prochlorperazine (Compazine) 10 MG tablet    Indications: CLL (chronic lymphocytic leukemia) (HCC) Take 1 Tab by mouth every six hours as needed for Nausea. 30 Tab   2 01/27/2020   Active   IBRUtinib (Imbruvica) 420 MG tablet    Indications: CLL (chronic lymphocytic leukemia) (HCC) Take 1 Tab = 420 mg total by mouth one time a day 28 Each   1 10/27/2020   Active   IBRUtinib (Imbruvica) 140 MG capsule    Indications: CLL (chronic lymphocytic leukemia) (HCC) Take 3 Caps = 420 mg total by mouth one time a day 84 Cap   0 11/25/2020   Active       [x]Pharmacy med list: [facility]   Name Strength Instructions Last Fill Date QTY/DS Notes   [x] Albuterol  0.083% 1 vial q4h prn  11/27/20 225    [x] Divalproex er 500 mg 2 tabs BID 11/10/20 120    [x] levetiracetam  500 mg 1 tab q12h  11/10/20 180    [x] pioglitazone 15 mg Daily  11/10/20 90    [x] citalopram 40 mg 1 tab daily 10/02/20 90    [x] quetiapine 200 mg Daily  10/2/20 90    [x] simvastatin 20 mg Daily at bedtime  9/8/20 90    [x] lisinopril 20 mg Daily  8/15/20 90    []Pharmacy phone call  []Outside meds dispense report  []Nursing home or Assisted Living MAR:  []Other: **    Pharmacy desired at discharge: [facility]    Is patient on coumadin?  [x]No    Requests for consultation by provider or pharmacist:   [] Patient understands why all of their meds were prescribed and how to take them. No questions.   [x] Fill dates coincide with compliancy for all maintenance meds.   [] Fill dates do not coincide with compliancy with maintenance meds. See notes in PTA medlist about how patient is taking.   [] Patient has questions about the following:    Comments: unable to go  through with patient at this time. Daughter did not answer phone. Fill dates align with compliancy for all maintenance meds aside from lisinopril. Will update last times taken when/if daughter calls back.       Bhavna Jacobs on 12/14/2020 at 8:09 AM       Discrepancies: [x] No []Not Applicable []Yes: listed below    Time spent on medication reconciliation:   []5-20 mins  []20-40 mins  [x]> 40 mins    Issues completing PTA medication reconciliation:  [] On hold for a long time  [] Waited for a call back  [] Fax didn't come through  [] Fax took a long time  [] Other:    Notifying appropriate party of changes/additions/discrepancies:  []No pertinent changes made, notification not necessary.   [x] Notified attending provider via text page  [] Notified attending provider in person  [] Notified pharmacy  [] Notified nurse  [] Attending provider not available, left detailed notes  [] Changes/additions made don't need provider notification because provider has not seen patient or input any orders  [] Changes/additions made don't need provider notification because changes made are to medications not ordered  Medications Prior to Admission   Medication Sig Dispense Refill Last Dose     albuterol (PROVENTIL) (2.5 MG/3ML) 0.083% neb solution Take 2.5 mg by nebulization every 4 hours as needed for shortness of breath / dyspnea or wheezing   Unknown at Unknown time     aspirin (ASA) 81 MG EC tablet Take 81 mg by mouth daily   Unknown at Unknown time     citalopram (CELEXA) 40 MG tablet Take 40 mg by mouth daily    Unknown at Unknown time     divalproex sodium extended-release (DEPAKOTE ER) 500 MG 24 hr tablet Take 1,000 mg by mouth 2 times daily    Unknown at Unknown time     ibrutinib (IMBRUVICA) 140 MG tablet Take 420 mg by mouth daily    Unknown at Unknown time     levETIRAcetam (KEPPRA) 500 MG tablet Take 500 mg by mouth every 12 hours    Unknown at Unknown time     lisinopril (ZESTRIL) 20 MG tablet Take 20 mg by mouth daily    Unknown at Unknown time     pioglitazone (ACTOS) 15 MG tablet Take 15 mg by mouth daily    Unknown at Unknown time     prochlorperazine (COMPAZINE) 10 MG tablet Take 10 mg by mouth every 6 hours as needed   Unknown at Unknown time     QUEtiapine (SEROQUEL) 200 MG tablet Take 200 mg by mouth daily    Unknown at Unknown time     simvastatin (ZOCOR) 20 MG tablet Take 20 mg by mouth every evening    Unknown at Unknown time

## 2020-12-14 NOTE — PROGRESS NOTES
12/14/20 1300   Signing Clinician's Name / Credentials   Signing clinician's name / credentials Marlen Carson, OTR/L   Quick Adds   Rehab Discipline OT   OT Discharge Planning    OT Discharge Recommendation (DC Rec) Home with assist;home with home care occupational therapy   OT Rationale for DC Rec Currently requiring mainly SBA for ADLs. Pt fatigues easy though.   OT Brief overview of current status  SBA supine>sit, sit>stand from EOB, SBA-CGA ambulating to/from BR with and without walker, SBA-CGA on/off toilet, CGA in standing to pull up boxers. SpO2 88-91% on RA. Increased from 88% quickly with rest and instruction in pursed lip breathing   Additional Documentation   Rehab Comments Pt impulsive but mainly completed tasks with SBA. Needs cues to use walker   OT Plan Progress safety and independence in ADLs

## 2020-12-14 NOTE — PHARMACY-MEDICATION REGIMEN REVIEW
Pharmacy Antimicrobial Stewardship Assessment     Current Antimicrobial Therapy:  Anti-infectives (From now, onward)    Start     Dose/Rate Route Frequency Ordered Stop    12/14/20 1500  azithromycin (ZITHROMAX) 250 mg in sodium chloride 0.9 % 250 mL intermittent infusion      250 mg  over 1 Hours Intravenous EVERY 24 HOURS 12/13/20 1440      12/13/20 1500  ceFEPIme (MAXIPIME) 2-5 GM-%(50ML) intermittent infusion 2 g      2 g  over 30 Minutes Intravenous EVERY 8 HOURS 12/13/20 1440            Indication: CAP    Days of Therapy: 2     Pertinent Labs:  Creatinine   Date Value Ref Range Status   12/14/2020 0.73 0.66 - 1.25 mg/dL Final   12/13/2020 0.89 0.66 - 1.25 mg/dL Final     Patient has CLL so WBC may not be helpful for monitoring  WBC   Date Value Ref Range Status   12/14/2020 24.3 (H) 4.0 - 11.0 10e9/L Final   12/13/2020 38.0 (H) 4.0 - 11.0 10e9/L Final     Procalcitonin   Date Value Ref Range Status   12/13/2020 0.08 ng/ml Final     Comment:     0.05-0.24 ng/ml Low risk of systemic bacterial infection. Local bacterial   infection possible.  Recommendation: Assess other clinical features of   infection. Discourage antibiotics unless strong clinical suspicion for serious   infection.       Culture Results:   (12/13/20) COVID = negative  (12/13/20) Blood     Recommendations/Interventions:  1. Obtain sputum culture      Henrry Oliver, [facility]  December 14, 2020

## 2020-12-14 NOTE — PROGRESS NOTES
Inpatient Physical Therapy Evaluation    Name: Cuco Cooper MRN# [id number]   Age: 65 year old YOB: 1955     Date of Consultation: 2020  Consultation is requested by:  Dr. Markham  Specific Consultation Request:  weakness  Primary care provider: Alexi Unger    General Information:   Onset Date: 2020    History of Current Problem/Admission: Anemia [D64.9]  CLL (chronic lymphocytic leukemia) (H) [C91.10]  Community acquired bacterial pneumonia [J15.9]  At high risk for infection due to chemotherapy [Z91.89, Z92.21]    Prior Level of Function: Pt states he was independent with all ADLs at home.  Pt states he ambulates without assistive device.    Ambulation: 0 - Independent   Transferrin - Independent   Toiletin - Independent    Bathin - Independent   Dressin - Independent   Eatin - Independent   Communication: 0 - Understands/communicates without difficulty  Swallowin - swallows foods/liquids without difficulty  Cognition: 0 - no cognitive issues reported    Fall history within the last 6 months: Yes, states maybe 1    Current Living Situation: Patient has 3 stairs to enter the home with rails. Pt lives with his family, states they are home during the days    Current Equipment Used at Home: none     Patient & Family Goals: to go home     Past Medical History:   Past Medical History:   Diagnosis Date     Asthma      Bipolar affective disorder (H)      Cannabis abuse      Chronic lymphatic leukemia (H)      DM (diabetes mellitus) (H)      Dyslipidemia      Hematemesis      HTN (hypertension)      Seizure disorder (H)      Seizures (H)      Status epilepticus (H)      TBI (traumatic brain injury) (H)        Past Surgical History:  Past Surgical History:   Procedure Laterality Date     C REPAIR SHOULDER CAPSULE,ANTERIOR      Left     C TOTAL HIP ARTHROPLASTY      Right     FOREARM SURGERY      Left     TONSILLECTOMY         Medications:   Current  Facility-Administered Medications   Medication     azithromycin (ZITHROMAX) 250 mg in sodium chloride 0.9 % 250 mL intermittent infusion     ceFEPIme (MAXIPIME) 2-5 GM-%(50ML) intermittent infusion 2 g     citalopram (celeXA) tablet 60 mg     hydrocortisone (CORTAID) 1 % cream     levETIRAcetam (KEPPRA) tablet 500 mg     lidocaine (LMX4) kit     lidocaine 1 % 0.1-1 mL     melatonin tablet 1 mg     naloxone (NARCAN) injection 0.2 mg    Or     naloxone (NARCAN) injection 0.4 mg    Or     naloxone (NARCAN) injection 0.2 mg    Or     naloxone (NARCAN) injection 0.4 mg     omeprazole (priLOSEC) CR capsule 20 mg     ondansetron (ZOFRAN-ODT) ODT tab 4 mg    Or     ondansetron (ZOFRAN) injection 4 mg     pioglitazone (ACTOS) tablet 15 mg     polyethylene glycol (MIRALAX) Packet 17 g     QUEtiapine (SEROquel) tablet 100 mg     simvastatin (ZOCOR) tablet 20 mg     sodium chloride (PF) 0.9% PF flush 3 mL     sodium chloride (PF) 0.9% PF flush 3 mL       Weight Bearing Status: FWB LEs     Cognitive Status Examination:  Orientation: disoriented to month (February)  Level of Consciousness: alert  Follows Commands and Answers Questions: 50% of the time  Personal Safety and Judgement: Impulsive  Memory: Short-term memory intact  Comments:     Pain:   Currently in pain? No  Pain Location?   Pain Rating:     Physical Therapy Evaluation:   Integumentary/Edema: unremarkable  ROM: LE AROM WFL  Strength: unable to MMT due to pt's impulsivity however strength appears WFL for mobility  Bed Mobility: sup>sit mod I  Transfers: sit<>stand SBA but impulsive in nature  Gait: Ambulated into bathroom in room with FWW part of way then no assistive device once in bathroom due to pace, SBA-CGA with some instability noted without assistive device.  Dyspnea noted with sats 92% with activity on RA.  Stairs: NT  Balance: fair- without support  Sensory: NT  Coordination: NT    Physical Therapy Interventions: Balance, Bed Mobility, Gait Training ,  Neuro-muscular re-education, Strengthening, Transfer Training, Risk Factor Education, Home Programming Guidelines and Progressive Activity/Exercise     Clinical Impressions:  Criteria for Skilled Therapeutic Intervention Met: Yes, treatment indicated  PT Diagnosis: gait disturbance  Influenced by the following impairments: weakness, impulsivity, impaired balance  Functional limitations due to impairment: decreased safety with transfers, ambulation, and stairs  Clinical presentation: Evolving/Changing  Clinical presentation rationale: clinical judgement  Clinical Decision making (complexity): Moderate Complexity  Frequency: 1-2x/day, 5-6x/week  Predicted Duration of Therapy Intervention (days/wks): 5 days  Anticipated Discharge Disposition: Home with Home Therapy, Home with Outpatient Therapy, but TBD with further assessment of mobility  Anticipated Equipment Needs at Discharge: may benefit from FWW or SEC at discharge  Risks and Benefits of therapy have been explained: Yes  Patient, Family & other staff in agreement with plan of care: Yes  Clinical Impression Comments: Pt demonstrates impulsivity during evaluation and assessment limited due to his frequent trips to bathroom but not able to tolerate further once finished.  Pt appears relatively steady on feet with assistive device and some increased instability without however assessment limited to short distance to/from bathroom.  Will need further assessment but anticipate home with home PT vs outpatient PT may be appropriate, may require an assistive device for safety upon discharge if pt agreeable.      Total Eval Time: 18

## 2020-12-14 NOTE — PROGRESS NOTES
[facility]    Hospitalist Progress Note    Date of Service (when I saw the patient): 12/14/2020    Assessment & Plan     Cuco Cooper is a 65 year old male with history of CLL currently on ibrutinib who presents with fatigue, weakness, cough, and fever.  Chest x-ray suggests left lower lobe pneumonia.     Community-acquired pneumonia: Complicated by immunocompromise, patient is on ibrutinib for CLL.  WBC has historically been as high as 200 K, ibrutinib started earlier 2020, patient's leukocytosis has decreased, now 24K.  Very likely dysfunctional blood cells however, resulting in immunocompromise.  -Continue cefepime as well as azithromycin  -Blood cultures pending, NGTD  -Patient does not have respiratory failure currently, satting high 90s on room air     Anemia: Complicated by CLL and ibrutinib.  Most likely related to bone marrow suppression by either/both of these entities.  Hemoglobin on 10/27/2020 was 8.7.  Currently hemoglobin 6.6, no history of clinical blood loss.  -transfused 2 units packed red blood cells on 12/13  -hgb at 7.9, will monitor     CLL: Currently on ibrutinib via Dr. Sarah with [facility] hematology/oncology.  -Stable, currently no issues     Hypertension: BP and heart rate currently within normal limits and stable  -Home medications include Lasix 20 mg daily, lisinopril 20 mg, will continue as able     Seizure disorder: Patient states he has not had a seizure in a long time.  -Continue outpatient      Non-insulin-dependent diabetes mellitus type 2: Patient is on Actos.  -Continue as able  -Sliding scale coverage as needed     Bipolar disorder: Patient is on Abilify, Celexa, and Seroquel  -Continue psych meds as able     DVT Prophylaxis: Pneumatic Compression Devices     Code Status: DNR     Disposition: Expected discharge in 1-2 days once clinically improved.  PT/OT eval.    Sean Markham MD        Interval History   Patient seen in room.  Patient endorses modest improvement.   No acute events overnight, no new symptoms.  Tolerated transfusion without difficulty.  Persistent weakness.    -Data reviewed today: I reviewed all new labs and imaging results over the last 24 hours. I personally reviewed imaging reports.    Physical Exam   Temp: 99.7  F (37.6  C) Temp src: Tympanic BP: 119/73 Pulse: 85   Resp: 20 SpO2: 92 % O2 Device: None (Room air) Oxygen Delivery: 1/2 LPM  Vitals:    12/13/20 1159 12/13/20 1411   Weight: 86.2 kg (190 lb) 81.9 kg (180 lb 8.9 oz)     Vital Signs with Ranges  Temp:  [98.1  F (36.7  C)-101.5  F (38.6  C)] 99.7  F (37.6  C)  Pulse:  [64-98] 85  Resp:  [11-22] 20  BP: (108-140)/(65-87) 119/73  SpO2:  [89 %-99 %] 92 %    Intake/Output Summary (Last 24 hours) at 12/14/2020 0955  Last data filed at 12/14/2020 0625  Gross per 24 hour   Intake 1250 ml   Output 420 ml   Net 830 ml       Peripheral IV 12/13/20 Right Upper forearm (Active)   Site Assessment WDL 12/14/20 0010   Line Status Saline locked 12/14/20 0010   Phlebitis Scale 0-->no symptoms 12/14/20 0010   Infiltration Scale 0 12/14/20 0010   Number of days: 1       Peripheral IV 12/13/20 Anterior;Left Lower forearm (Active)   Site Assessment [facility] 12/14/20 0010   Line Status Saline locked 12/14/20 0010   Dressing Intervention New dressing  12/13/20 1449   Phlebitis Scale 0-->no symptoms 12/14/20 0010   Infiltration Scale 0 12/14/20 0010   Number of days: 1     Line/device assessment(s) completed for medical necessity    Constitutional: AA, NAD, disheveled, unkempt  Eyes: PERRLA, no injection, no icterus  HEENT: atraumatic, normocephalic  Respiratory: relatively CTA b/l  Cardiovascular: S1 S2 RRR  GI: soft, NT, ND, + bowel sounds  Lymph/Hematologic: no palpable lymphadenopathy  Skin: no rashes, no lesions  Musculoskeletal: No edema, good tone, no deformities  Neurologic: oriented x 3, no focal deficits  Psychiatric: bizarre affect      Medications       ARIPiprazole  5 mg Oral Daily     azithromycin  250 mg  Intravenous Q24H     ceFEPIme (MAXIPIME) IV  2 g Intravenous Q8H     citalopram  60 mg Oral Daily     hydrocortisone   Topical BID     levETIRAcetam  500 mg Oral BID     lisinopril  20 mg Oral Daily     omeprazole  20 mg Oral QAM     pioglitazone  15 mg Oral Daily     QUEtiapine  100 mg Oral At Bedtime     simvastatin  20 mg Oral QPM     sodium chloride (PF)  3 mL Intracatheter Q8H       Data   Recent Labs   Lab 12/14/20  0520 12/14/20  0004 12/13/20  1205   WBC 24.3*  --  38.0*   HGB 7.9* 7.5* 6.6*     --  107*     --  445     --  138   POTASSIUM 3.8  --  3.8   CHLORIDE 109  --  104   CO2 26  --  26   BUN 17  --  17   CR 0.73  --  0.89   ANIONGAP 5  --  8   IVON 7.8*  --  8.3*   *  --  141*   ALBUMIN  --   --  2.1*   PROTTOTAL  --   --  7.1   BILITOTAL  --   --  0.5   ALKPHOS  --   --  235*   ALT  --   --  21   AST  --   --  40     Lactic Acid   Date Value Ref Range Status   12/13/2020 1.9 0.7 - 2.0 mmol/L Final   10/31/2015 1.1 0.4 - 2.0 mmol/L Final   10/29/2015 4.4 (H) 0.4 - 2.0 mmol/L Final       Recent Results (from the past 24 hour(s))   XR Chest Port 1 View    Narrative    PROCEDURE:  XR CHEST PORT 1 VW    HISTORY: chest pain and fever. .    COMPARISON:  10/31/2015    FINDINGS:    The cardiomediastinal contours are partially obscured. There is  calcific aortic atherosclerosis.   There is new consolidative opacity at the left lung base associated  with some pleural fluid. Mild patchy right medial base opacity is also  questioned. No pneumothorax.      Impression    IMPRESSION:  Left pleural effusion. Left greater than right base  pulmonary opacities. Left lower lobe/multifocal pneumonia with a left  parapneumonic effusion is in the differential. Recommend follow-up.      MD Sean DON MD

## 2020-12-14 NOTE — PLAN OF CARE
Tolerated blood transfusion without issue. Hgb recheck at midnight. Placed on 1/2 L O2 to maintain sats.

## 2020-12-14 NOTE — PROGRESS NOTES
"Assessment completed by visit  with Neftaly.    LOC: alert, oriented, soft spoken, responses limited     Dx: PNA   Chronic Disease Management: CLL, anemia, HTN, DMII, bipolar     Lives with: daughterLashell and her family   Living at:  home  Transportation: YES Self or family     Primary PCP: Alexi Unger  Insurance:  Medicare and [facility]   Medicare IMM letter reviewed with Neftaly.    Support System:  Family   Homecare/PCA: not connected   /County Services:   Not connected   : NO      How was the [facility] notification completed: n/a    Health Care Directive: no   Guardian: no   POA: no    Pharmacy: [facility]   Meds management: independent     Adequate Resources for needs (housing, utilities, food/med): YES  Household chores: Lah[employer] and friend, Bill   Work/community/social activity: YES as desired     ADLs: independent  Ambulation:independent  Falls: denies   Nutrition: no concerns  Sleep: no concerns     Equipment used: none      Oxygen supplier: no       Does the supplier have valid oxygen orders: n/a    Mental health: bipolar  Substance abuse: smokes \"a lot\"  Exposure to violence/abuse: denies   Stressors: no concerns     Able to Return to Prior Living Arrangements: YES    Choice of Vendor: to be determined     Barriers: no barriers identified     AYO: low     Plan: return home       "

## 2020-12-14 NOTE — PLAN OF CARE
Face to face report given with opportunity to observe patient.    Report given to ARIAS Vazquez   12/13/2020  6:56 PM

## 2020-12-14 NOTE — PLAN OF CARE
/73 (BP Location: Right arm)   Pulse 85   Temp 99.7  F (37.6  C) (Tympanic)   Resp 20   Ht 1.829 m (6')   Wt 81.9 kg (180 lb 8.9 oz)   SpO2 92%   BMI 24.49 kg/m        Pt A&O x4. Disoriented to time at start of shift. Slow to respond, somewhat garbled at times. Impulsive and does not use call light. Alarm on in bed. Up to bathroom multiple times. Straining to void. Bev urine. ROSEN. Sats 92-95% on RA. Fine crackles to LLL. C/o back pain, norco administered x1. Continues on cefepime. IVs SL. Bed in lowest position and call light in reach.     Face to face report given with opportunity to observe patient.    Report given to Jennifer Mijares RN   12/14/2020  7:46 AM

## 2020-12-14 NOTE — PLAN OF CARE
Face to face report given with opportunity to observe patient.    Report given to Nu Lim RN   12/14/2020  12:03 AM

## 2020-12-14 NOTE — PLAN OF CARE
Reason for hospital stay:  Anemia    Most recent vitals: /75 (BP Location: Right arm)   Pulse 76   Temp 99.9  F (37.7  C) (Tympanic)   Resp 18   Ht 1.829 m (6')   Wt 81.9 kg (180 lb 8.9 oz)   SpO2 92%   BMI 24.49 kg/m      Alert, disoriented to time, impulsive, does not use call light appropriately, stand by asst. Gets up to use bathroom without using call light, yells for for help, has to be reminded to use call light. Complains of pain in back but will not give pain a number rating, shifts position in bed frequently, grimaces with movement, but does not ask for pain relief. Patient reluctant to stay up, wants to lay in bed and rest. Lung sounds clear on right side, fine crackles in left base. Has urinary hesitancy, strains to voids, urine dark yellow to leatha in color. Skin warm and dry, flaky, scattered scabs, and bruises in different stages of healing. Zithromax IV Q 24hrs. Cefepime IV Q 8hrs.     Face to face report given with opportunity to observe patient.    Report given to ARIAS Blandon RN   12/14/2020  3:55 PM

## 2020-12-15 ENCOUNTER — APPOINTMENT (OUTPATIENT)
Dept: OCCUPATIONAL THERAPY | Facility: HOSPITAL | Age: 65
DRG: 840 | End: 2020-12-15
Payer: MEDICARE

## 2020-12-15 ENCOUNTER — APPOINTMENT (OUTPATIENT)
Dept: PHYSICAL THERAPY | Facility: HOSPITAL | Age: 65
DRG: 840 | End: 2020-12-15
Payer: MEDICARE

## 2020-12-15 LAB
ANION GAP SERPL CALCULATED.3IONS-SCNC: 6 MMOL/L (ref 3–14)
BUN SERPL-MCNC: 14 MG/DL (ref 7–30)
CALCIUM SERPL-MCNC: 7.9 MG/DL (ref 8.5–10.1)
CHLORIDE SERPL-SCNC: 110 MMOL/L (ref 94–109)
CO2 SERPL-SCNC: 25 MMOL/L (ref 20–32)
CREAT SERPL-MCNC: 0.74 MG/DL (ref 0.66–1.25)
ERYTHROCYTE [DISTWIDTH] IN BLOOD BY AUTOMATED COUNT: 19.4 % (ref 10–15)
GFR SERPL CREATININE-BSD FRML MDRD: >90 ML/MIN/{1.73_M2}
GLUCOSE BLDC GLUCOMTR-MCNC: 132 MG/DL (ref 70–99)
GLUCOSE BLDC GLUCOMTR-MCNC: 136 MG/DL (ref 70–99)
GLUCOSE SERPL-MCNC: 112 MG/DL (ref 70–99)
HCT VFR BLD AUTO: 27 % (ref 40–53)
HGB BLD-MCNC: 8.2 G/DL (ref 13.3–17.7)
MCH RBC QN AUTO: 30.8 PG (ref 26.5–33)
MCHC RBC AUTO-ENTMCNC: 30.4 G/DL (ref 31.5–36.5)
MCV RBC AUTO: 102 FL (ref 78–100)
PLATELET # BLD AUTO: 300 10E9/L (ref 150–450)
POTASSIUM SERPL-SCNC: 3.7 MMOL/L (ref 3.4–5.3)
RBC # BLD AUTO: 2.66 10E12/L (ref 4.4–5.9)
SODIUM SERPL-SCNC: 141 MMOL/L (ref 133–144)
WBC # BLD AUTO: 24.8 10E9/L (ref 4–11)

## 2020-12-15 PROCEDURE — 258N000003 HC RX IP 258 OP 636: Performed by: INTERNAL MEDICINE

## 2020-12-15 PROCEDURE — 80048 BASIC METABOLIC PNL TOTAL CA: CPT | Performed by: INTERNAL MEDICINE

## 2020-12-15 PROCEDURE — 120N000001 HC R&B MED SURG/OB

## 2020-12-15 PROCEDURE — 85027 COMPLETE CBC AUTOMATED: CPT | Performed by: INTERNAL MEDICINE

## 2020-12-15 PROCEDURE — 250N000013 HC RX MED GY IP 250 OP 250 PS 637: Performed by: INTERNAL MEDICINE

## 2020-12-15 PROCEDURE — 36415 COLL VENOUS BLD VENIPUNCTURE: CPT | Performed by: INTERNAL MEDICINE

## 2020-12-15 PROCEDURE — 97110 THERAPEUTIC EXERCISES: CPT | Mod: GO

## 2020-12-15 PROCEDURE — 99232 SBSQ HOSP IP/OBS MODERATE 35: CPT | Performed by: NURSE PRACTITIONER

## 2020-12-15 PROCEDURE — 250N000013 HC RX MED GY IP 250 OP 250 PS 637: Performed by: NURSE PRACTITIONER

## 2020-12-15 PROCEDURE — 250N000011 HC RX IP 250 OP 636: Performed by: INTERNAL MEDICINE

## 2020-12-15 PROCEDURE — 999N001017 HC STATISTIC GLUCOSE BY METER IP

## 2020-12-15 PROCEDURE — 97110 THERAPEUTIC EXERCISES: CPT | Mod: GP

## 2020-12-15 PROCEDURE — 97530 THERAPEUTIC ACTIVITIES: CPT | Mod: GP

## 2020-12-15 RX ADMIN — ACETAMINOPHEN 650 MG: 325 TABLET, FILM COATED ORAL at 14:03

## 2020-12-15 RX ADMIN — PIOGLITAZONE 15 MG: 15 TABLET ORAL at 08:52

## 2020-12-15 RX ADMIN — CITALOPRAM HYDROBROMIDE 60 MG: 20 TABLET ORAL at 08:52

## 2020-12-15 RX ADMIN — CEFEPIME HYDROCHLORIDE 2 G: 2 INJECTION, SOLUTION INTRAVENOUS at 17:33

## 2020-12-15 RX ADMIN — OMEPRAZOLE 20 MG: 20 CAPSULE, DELAYED RELEASE ORAL at 08:52

## 2020-12-15 RX ADMIN — ACETAMINOPHEN 650 MG: 325 TABLET, FILM COATED ORAL at 20:46

## 2020-12-15 RX ADMIN — CEFEPIME HYDROCHLORIDE 2 G: 2 INJECTION, SOLUTION INTRAVENOUS at 00:41

## 2020-12-15 RX ADMIN — AZITHROMYCIN 250 MG: 500 INJECTION, POWDER, LYOPHILIZED, FOR SOLUTION INTRAVENOUS at 13:34

## 2020-12-15 RX ADMIN — LEVETIRACETAM 500 MG: 500 TABLET, FILM COATED ORAL at 08:52

## 2020-12-15 RX ADMIN — ANORECTAL OINTMENT: 15.7; .44; 24; 20.6 OINTMENT TOPICAL at 15:52

## 2020-12-15 RX ADMIN — LEVETIRACETAM 500 MG: 500 TABLET, FILM COATED ORAL at 20:46

## 2020-12-15 RX ADMIN — CEFEPIME HYDROCHLORIDE 2 G: 2 INJECTION, SOLUTION INTRAVENOUS at 08:56

## 2020-12-15 RX ADMIN — QUETIAPINE 100 MG: 100 TABLET, FILM COATED ORAL at 20:46

## 2020-12-15 RX ADMIN — ACETAMINOPHEN 650 MG: 325 TABLET, FILM COATED ORAL at 00:01

## 2020-12-15 RX ADMIN — SIMVASTATIN 20 MG: 10 TABLET, FILM COATED ORAL at 20:46

## 2020-12-15 ASSESSMENT — ACTIVITIES OF DAILY LIVING (ADL)
ADLS_ACUITY_SCORE: 15
ADLS_ACUITY_SCORE: 16
ADLS_ACUITY_SCORE: 15
ADLS_ACUITY_SCORE: 15
ADLS_ACUITY_SCORE: 16
ADLS_ACUITY_SCORE: 15

## 2020-12-15 NOTE — PLAN OF CARE
Face to face report given with opportunity to observe patient.    Report given to Sonia RN and ARIAS Dawson RN   12/15/2020  7:00 AM

## 2020-12-15 NOTE — PHARMACY-MEDICATION REGIMEN REVIEW
Pharmacy Antimicrobial Stewardship Assessment     Current Antimicrobial Therapy:  Anti-infectives (From now, onward)    Start     Dose/Rate Route Frequency Ordered Stop    12/14/20 1400  azithromycin (ZITHROMAX) 250 mg in sodium chloride 0.9 % 250 mL intermittent infusion      250 mg  over 1 Hours Intravenous EVERY 24 HOURS 12/13/20 1440      12/13/20 1500  ceFEPIme (MAXIPIME) 2-5 GM-%(50ML) intermittent infusion 2 g      2 g  over 30 Minutes Intravenous EVERY 8 HOURS 12/13/20 1440               Indication: CAP     Days of Therapy: 3     Pertinent Labs:  Creatinine   Date Value Ref Range Status   12/15/2020 0.74 0.66 - 1.25 mg/dL Final   12/14/2020 0.73 0.66 - 1.25 mg/dL Final   12/13/2020 0.89 0.66 - 1.25 mg/dL Final     Patient has CLL so WBC may not be helpful for monitoring  WBC   Date Value Ref Range Status   12/15/2020 24.8 (H) 4.0 - 11.0 10e9/L Final   12/14/2020 24.3 (H) 4.0 - 11.0 10e9/L Final   12/13/2020 38.0 (H) 4.0 - 11.0 10e9/L Final     Procalcitonin   Date Value Ref Range Status   12/13/2020 0.08 ng/ml Final     Comment:     0.05-0.24 ng/ml Low risk of systemic bacterial infection. Local bacterial   infection possible.  Recommendation: Assess other clinical features of   infection. Discourage antibiotics unless strong clinical suspicion for serious   infection.       Culture Results:   (12/13/20) COVID = negative  (12/13/20) Blood     Recommendations/Interventions:  1. Obtain sputum culture    Henrry Oliver, [facility]  December 15, 2020

## 2020-12-15 NOTE — PROGRESS NOTES
12/15/20 1547   Signing Clinician's Name / Credentials   Signing clinician's name / credentials Gini Gudino PTA   Quick Adds   Rehab Discipline PT   PT Assistant Visit Number 1   Therapeutic Activity   Treatment Detail Pt would not get out of bed to ambulate stated I am tired and refused to get out of bed. Explained to pt the importance on getting out of bed and moving around.  Discussed with pt the doctor wants him up and moving around to get strength and endurance back. Pt refused and stated I do not want to get out of bed this afternoon to walk. Encouraged pt to ask nursing to get him up and walk around with FWW this evening.    Therapeutic Exercise   Minutes of Treatment 15   Symptoms Noted During/After Treatment fatigue   Treatment Detail Supine 10 reps: heel slides, quad sets, ankle pumps glut sets was very fatigued took frequent rest periods.    PT Discharge Planning    PT Discharge Recommendation (DC Rec) home with home care physical therapy   PT Rationale for DC Rec Pt would benefit from home PT for safety assessment and improving activity tolerance   PT Brief overview of current status  Pt supine in bed at start of treatment and needed encouragement to participate. Pt transferred to edge of bed independently. Completed transfers c supervision using FWW. Ambulated 150' c FWW and superivison. Pt was steady and safe with walker but demonstrates decreased activity tolerance. Following ambulated SpO2 95%,  and pt sititng in recliner , OT working with pt   Additional Documentation   PT Plan Progress mobility as tolerated   Total Session Time   Total Session Time (minutes) 15 minutes

## 2020-12-15 NOTE — PLAN OF CARE
VSS with exception of fevers 100.5-101.6. No anti-pyretics available- MD notified at start of shift no new orders placed. Overall unkempt appearance, offered shower or bed bath, patient refused. Disoriented to time, situation. Impulsive behaviors- jumping out of bed, unsure of situation. Alarms on, room near unit station. HR reg, BS hyperactive, LS coarse to bases. No increased respiratory effort, remains on RA. BG as charted. Good appetite and fluid intake. Call light remains in reach.       Face to face report given with opportunity to observe patient.    Report given to ARIAS Parks   12/14/2020  11:30 PM

## 2020-12-15 NOTE — PLAN OF CARE
No acute changes overnight. Impulsive and forgetful; alarms on for safety. Had a 101F temp; PRN tylenol given. Recheck 99.5. Up 5x to void this shift; strains to void and urgency noted. Lungs coarse throughout; nonproductive dry cough noted. Alarms on for safety; call light remains within reach but does not make needs known. Denies pain.

## 2020-12-15 NOTE — PROGRESS NOTES
[facility]    Hospitalist Progress Note    Date of Service (when I saw the patient): 12/15/2020    Assessment & Plan       Community acquired bacterial pneumonia: Left lower opacity plus likely effusion. Symptoms x3-4 weeks prior to presentation. He is still having productive cough. He has not required any oxygen. He has chronic leukocytosis due to CLL, has had a fever, last was 101 yesterday evening. Will continue zithromax and cefepime. Recheck CXR in AM.       Seizure disorder (H): Continue Keppra, denies recent seizure activity.       Hemolytic anemia associated with lymphoproliferative disorder (H): Hgb 6.6 on arrival so received 2 units PRBC's. Hemoglobin has been stable since, no indications of acute blood loss.       Bipolar disorder, unspecified (H): Continue home medications as prescribed.      Diabetes mellitus, type II (H): Controlled on oral Actos, continue same. Will get QID sugars while here, he does not check sugars at home.       CLL (chronic lymphocytic leukemia) (H)    At high risk for infection due to chemotherapy      DVT Prophylaxis: Pneumatic Compression Devices  Code Status: No CPR- Pre-arrest intubation OK    Disposition: Expected discharge in 2-3 days once fevers resolved x24-48 hours.    Astrid Banuelos, CNP    Interval History    C/O continued overall malaise. Denies chest pain this morning, not dyspneic at rest, eating well.      -Data reviewed today: I reviewed all new labs and imaging results over the last 24 hours.     Physical Exam   Temp: 100.3  F (37.9  C) Temp src: Tympanic BP: 134/84 Pulse: 105   Resp: 18 SpO2: 96 % O2 Device: None (Room air)    Vitals:    12/13/20 1159 12/13/20 1411   Weight: 86.2 kg (190 lb) 81.9 kg (180 lb 8.9 oz)     Vital Signs with Ranges  Temp:  [98.5  F (36.9  C)-101.6  F (38.7  C)] 100.3  F (37.9  C)  Pulse:  [] 105  Resp:  [18-20] 18  BP: (103-146)/(53-84) 134/84  SpO2:  [92 %-96 %] 96 %  I/O last 3 completed shifts:  In: -   Out:  1550 [Urine:1550]    Peripheral IV 12/14/20 Anterior;Right Upper forearm (Active)   Site Assessment WDL 12/15/20 1500   Line Status Infusing 12/15/20 1500   Phlebitis Scale 0-->no symptoms 12/15/20 1500   Infiltration Scale 0 12/15/20 1500   Infiltration Site Treatment Method  None 12/15/20 1500   Number of days: 1     Line/device assessment(s) completed for medical necessity    Constitutional: Awake,alert, no acute distress  Respiratory: Diminished bilateral bases, very poor air movement throughout, no crackles or wheezes heard.  Cardiovascular: HRR, no murmurs, rubs,thrills.   GI: Soft,nontender, bowel sounds are positive.   Skin/Integumen: No unusual rashes, open areas or bruising.         Medications       azithromycin  250 mg Intravenous Q24H     ceFEPIme (MAXIPIME) IV  2 g Intravenous Q8H     citalopram  60 mg Oral Daily     levETIRAcetam  500 mg Oral BID     omeprazole  20 mg Oral QAM     pioglitazone  15 mg Oral Daily     QUEtiapine  100 mg Oral At Bedtime     simvastatin  20 mg Oral QPM     sodium chloride (PF)  3 mL Intracatheter Q8H       Data   Recent Labs   Lab 12/15/20  0521 12/14/20  0520 12/14/20  0004 12/13/20  1205   WBC 24.8* 24.3*  --  38.0*   HGB 8.2* 7.9* 7.5* 6.6*   * 100  --  107*    286  --  445    140  --  138   POTASSIUM 3.7 3.8  --  3.8   CHLORIDE 110* 109  --  104   CO2 25 26  --  26   BUN 14 17  --  17   CR 0.74 0.73  --  0.89   ANIONGAP 6 5  --  8   IVON 7.9* 7.8*  --  8.3*   * 121*  --  141*   ALBUMIN  --   --   --  2.1*   PROTTOTAL  --   --   --  7.1   BILITOTAL  --   --   --  0.5   ALKPHOS  --   --   --  235*   ALT  --   --   --  21   AST  --   --   --  40       No results found for this or any previous visit (from the past 24 hour(s)).

## 2020-12-15 NOTE — PLAN OF CARE
Reason for hospital stay:  CAP, Anemia    Most recent vitals: /55 (BP Location: Right arm)   Pulse 89   Temp 99.9  F (37.7  C) (Tympanic)   Resp 20   Ht 1.829 m (6')   Wt 81.9 kg (180 lb 8.9 oz)   SpO2 93%   BMI 24.49 kg/m      A & O, forgetful and impulsive at times, does not always use call light when needed, does not always make needs known. HR 70-80's. Hemoglobin stable- 8.2. Frequent, productive cough, oxygen low 90's on RA, lungs sounds clear in upper lobes, clear but diminished on right, coarse crackles on left. Skin warm dry, scattered bruises and scabs. Saline locked. Cefepime IV, Zithromax IV. Stand by asst. With walker and gait belt. Patient showered today.    Face to face report given with opportunity to observe patient.    Report given to ARIAS Blandon RN   12/15/2020  4:04 PM

## 2020-12-15 NOTE — PROGRESS NOTES
Discussed recommendations for home therapy at discharge.  Neftaly agreeable to this.  [facility] therapy not available until after holidays.  Estela at [facility] looking into therapy's ability to go to Neftaly's address.

## 2020-12-15 NOTE — PROGRESS NOTES
12/15/20 1300   Signing Clinician's Name / Credentials   Signing clinician's name / credentials Marlen Carson, OTR/L   Quick Adds   Rehab Discipline OT   Quick Adds Therapeutic Exercise   Therapeutic Exercise   Minutes of Treatment 23   Symptoms Noted During/After Treatment fatigue;shortness of breath   Treatment Detail Pt sitting in the chair upon OT arrival. He just finished walking with PT. He stated he already performed morning ADLs as he had a shower. Pt declined brushing his teeth at that time. Pt agreeable UB therex to work on endurance/activity tolerance and strength. He completed 2 sets of shoulder flex, punch outs, elbow flex/ext, and forearm pro/sup x 10 reps each, and 1 set of wrist and finger flex/ext x 10 reps each. During second set, pt declined completing any further exercise due to fatigue. He required extra time due to completing exercises slowly. Pt needed rest breaks in between each exercise, too. SpO2 before, during, and after was 94-97% on RA, HR low 100s. Pt transferred sit<>stand from the chair with SBA and OT placed draw sheet and blanket over chair. Pt asked for soda, and OT called cafeteria. Nursing informed.   OT Discharge Planning    OT Discharge Recommendation (DC Rec) Home with assist;home with home care occupational therapy   OT Rationale for DC Rec Currently requiring mainly SBA for ADLs but pt demonstrates decreased endurance/activity tolerance.   OT Brief overview of current status  Pt sitting in the chair upon OT arrival. He just finished walking with PT. He stated he already performed morning ADLs as he had a shower. Pt declined brushing his teeth at that time. Pt agreeable UB therex to work on endurance/activity tolerance and strength. He completed 2 sets of shoulder flex, punch outs, elbow flex/ext, and forearm pro/sup x 10 reps each, and 1 set of wrist and finger flex/ext x 10 reps each. During second set, pt declined completing any further exercise due to fatigue. He  required extra time due to completing exercises slowly. Pt needed rest breaks in between each exercise, too. SpO2 before, during, and after was 94-97% on RA, HR low 100s. Pt transferred sit<>stand from the chair with SBA and OT placed draw sheet and blanket over chair. Pt asked for soda, and OT called cafeteria. Nursing informed.   Additional Documentation   Rehab Comments Impaired activity tolerance   OT Plan Progress endurance and maximize independence in ADLs, as able.   Total Session Time   Total Session Time (minutes) 23 minutes

## 2020-12-16 ENCOUNTER — APPOINTMENT (OUTPATIENT)
Dept: CT IMAGING | Facility: HOSPITAL | Age: 65
DRG: 840 | End: 2020-12-16
Attending: NURSE PRACTITIONER
Payer: MEDICARE

## 2020-12-16 ENCOUNTER — APPOINTMENT (OUTPATIENT)
Dept: PHYSICAL THERAPY | Facility: HOSPITAL | Age: 65
DRG: 840 | End: 2020-12-16
Payer: MEDICARE

## 2020-12-16 ENCOUNTER — APPOINTMENT (OUTPATIENT)
Dept: OCCUPATIONAL THERAPY | Facility: HOSPITAL | Age: 65
DRG: 840 | End: 2020-12-16
Payer: MEDICARE

## 2020-12-16 ENCOUNTER — APPOINTMENT (OUTPATIENT)
Dept: GENERAL RADIOLOGY | Facility: HOSPITAL | Age: 65
DRG: 840 | End: 2020-12-16
Attending: NURSE PRACTITIONER
Payer: MEDICARE

## 2020-12-16 LAB
ANION GAP SERPL CALCULATED.3IONS-SCNC: 5 MMOL/L (ref 3–14)
BUN SERPL-MCNC: 16 MG/DL (ref 7–30)
CALCIUM SERPL-MCNC: 7.8 MG/DL (ref 8.5–10.1)
CHLORIDE SERPL-SCNC: 109 MMOL/L (ref 94–109)
CO2 SERPL-SCNC: 27 MMOL/L (ref 20–32)
CREAT SERPL-MCNC: 0.69 MG/DL (ref 0.66–1.25)
ERYTHROCYTE [DISTWIDTH] IN BLOOD BY AUTOMATED COUNT: 19 % (ref 10–15)
GFR SERPL CREATININE-BSD FRML MDRD: >90 ML/MIN/{1.73_M2}
GLUCOSE BLDC GLUCOMTR-MCNC: 137 MG/DL (ref 70–99)
GLUCOSE BLDC GLUCOMTR-MCNC: 140 MG/DL (ref 70–99)
GLUCOSE BLDC GLUCOMTR-MCNC: 141 MG/DL (ref 70–99)
GLUCOSE BLDC GLUCOMTR-MCNC: 88 MG/DL (ref 70–99)
GLUCOSE SERPL-MCNC: 121 MG/DL (ref 70–99)
HCT VFR BLD AUTO: 25.8 % (ref 40–53)
HGB BLD-MCNC: 7.9 G/DL (ref 13.3–17.7)
MCH RBC QN AUTO: 31 PG (ref 26.5–33)
MCHC RBC AUTO-ENTMCNC: 30.6 G/DL (ref 31.5–36.5)
MCV RBC AUTO: 101 FL (ref 78–100)
PLATELET # BLD AUTO: 306 10E9/L (ref 150–450)
POTASSIUM SERPL-SCNC: 3.6 MMOL/L (ref 3.4–5.3)
RBC # BLD AUTO: 2.55 10E12/L (ref 4.4–5.9)
SODIUM SERPL-SCNC: 141 MMOL/L (ref 133–144)
WBC # BLD AUTO: 20.8 10E9/L (ref 4–11)

## 2020-12-16 PROCEDURE — 250N000013 HC RX MED GY IP 250 OP 250 PS 637: Performed by: INTERNAL MEDICINE

## 2020-12-16 PROCEDURE — 71046 X-RAY EXAM CHEST 2 VIEWS: CPT

## 2020-12-16 PROCEDURE — 97110 THERAPEUTIC EXERCISES: CPT | Mod: GP

## 2020-12-16 PROCEDURE — 120N000001 HC R&B MED SURG/OB

## 2020-12-16 PROCEDURE — 999N001017 HC STATISTIC GLUCOSE BY METER IP

## 2020-12-16 PROCEDURE — 0W9B3ZZ DRAINAGE OF LEFT PLEURAL CAVITY, PERCUTANEOUS APPROACH: ICD-10-PCS | Performed by: RADIOLOGY

## 2020-12-16 PROCEDURE — 71250 CT THORAX DX C-: CPT

## 2020-12-16 PROCEDURE — 97530 THERAPEUTIC ACTIVITIES: CPT | Mod: GO

## 2020-12-16 PROCEDURE — 99222 1ST HOSP IP/OBS MODERATE 55: CPT | Performed by: SURGERY

## 2020-12-16 PROCEDURE — 85027 COMPLETE CBC AUTOMATED: CPT | Performed by: INTERNAL MEDICINE

## 2020-12-16 PROCEDURE — 250N000009 HC RX 250: Performed by: SURGERY

## 2020-12-16 PROCEDURE — 87070 CULTURE OTHR SPECIMN AEROBIC: CPT | Performed by: NURSE PRACTITIONER

## 2020-12-16 PROCEDURE — 74150 CT ABDOMEN W/O CONTRAST: CPT

## 2020-12-16 PROCEDURE — 97530 THERAPEUTIC ACTIVITIES: CPT | Mod: GP

## 2020-12-16 PROCEDURE — 250N000013 HC RX MED GY IP 250 OP 250 PS 637: Performed by: NURSE PRACTITIONER

## 2020-12-16 PROCEDURE — 258N000003 HC RX IP 258 OP 636: Performed by: INTERNAL MEDICINE

## 2020-12-16 PROCEDURE — 99232 SBSQ HOSP IP/OBS MODERATE 35: CPT | Performed by: NURSE PRACTITIONER

## 2020-12-16 PROCEDURE — 250N000011 HC RX IP 250 OP 636: Performed by: INTERNAL MEDICINE

## 2020-12-16 PROCEDURE — 36415 COLL VENOUS BLD VENIPUNCTURE: CPT | Performed by: INTERNAL MEDICINE

## 2020-12-16 PROCEDURE — 80048 BASIC METABOLIC PNL TOTAL CA: CPT | Performed by: INTERNAL MEDICINE

## 2020-12-16 RX ORDER — HYDROCORTISONE ACETATE 25 MG/1
25 SUPPOSITORY RECTAL 2 TIMES DAILY
Status: COMPLETED | OUTPATIENT
Start: 2020-12-16 | End: 2020-12-17

## 2020-12-16 RX ORDER — GINSENG 100 MG
CAPSULE ORAL 2 TIMES DAILY
Status: DISCONTINUED | OUTPATIENT
Start: 2020-12-16 | End: 2020-12-18 | Stop reason: HOSPADM

## 2020-12-16 RX ADMIN — LEVETIRACETAM 500 MG: 500 TABLET, FILM COATED ORAL at 08:33

## 2020-12-16 RX ADMIN — CEFEPIME HYDROCHLORIDE 2 G: 2 INJECTION, SOLUTION INTRAVENOUS at 17:06

## 2020-12-16 RX ADMIN — ACETAMINOPHEN 650 MG: 325 TABLET, FILM COATED ORAL at 04:22

## 2020-12-16 RX ADMIN — HYDROCORTISONE ACETATE 25 MG: 25 SUPPOSITORY RECTAL at 20:35

## 2020-12-16 RX ADMIN — CEFEPIME HYDROCHLORIDE 2 G: 2 INJECTION, SOLUTION INTRAVENOUS at 08:33

## 2020-12-16 RX ADMIN — CEFEPIME HYDROCHLORIDE 2 G: 2 INJECTION, SOLUTION INTRAVENOUS at 01:17

## 2020-12-16 RX ADMIN — CITALOPRAM HYDROBROMIDE 60 MG: 20 TABLET ORAL at 08:32

## 2020-12-16 RX ADMIN — AZITHROMYCIN 250 MG: 500 INJECTION, POWDER, LYOPHILIZED, FOR SOLUTION INTRAVENOUS at 13:46

## 2020-12-16 RX ADMIN — HYDROCORTISONE ACETATE 25 MG: 25 SUPPOSITORY RECTAL at 12:51

## 2020-12-16 RX ADMIN — OMEPRAZOLE 20 MG: 20 CAPSULE, DELAYED RELEASE ORAL at 08:32

## 2020-12-16 RX ADMIN — ACETAMINOPHEN 650 MG: 325 TABLET, FILM COATED ORAL at 08:33

## 2020-12-16 RX ADMIN — PIOGLITAZONE 15 MG: 15 TABLET ORAL at 08:32

## 2020-12-16 RX ADMIN — ACETAMINOPHEN 650 MG: 325 TABLET, FILM COATED ORAL at 17:06

## 2020-12-16 RX ADMIN — ANORECTAL OINTMENT: 15.7; .44; 24; 20.6 OINTMENT TOPICAL at 13:12

## 2020-12-16 RX ADMIN — QUETIAPINE 100 MG: 100 TABLET, FILM COATED ORAL at 20:35

## 2020-12-16 RX ADMIN — BACITRACIN: 500 OINTMENT TOPICAL at 20:35

## 2020-12-16 RX ADMIN — LEVETIRACETAM 500 MG: 500 TABLET, FILM COATED ORAL at 20:35

## 2020-12-16 RX ADMIN — SIMVASTATIN 20 MG: 10 TABLET, FILM COATED ORAL at 20:35

## 2020-12-16 ASSESSMENT — MIFFLIN-ST. JEOR: SCORE: 1649

## 2020-12-16 ASSESSMENT — ACTIVITIES OF DAILY LIVING (ADL)
ADLS_ACUITY_SCORE: 15

## 2020-12-16 NOTE — PLAN OF CARE
Pt calm, cooperative with cares. VS and assessment as charted. Denies pain. T max 100.1- rec'd tylenol per MAR, on RA and lungs coarse t/o. Pt independently positions self in bed. IV SL. . Free from  falls/ injury, call light within reach and bed in lowest position.       Face to face report given with opportunity to observe patient.    Report given to ARIAS Campos RN   12/16/2020  5:30 AM

## 2020-12-16 NOTE — PLAN OF CARE
Face to face report given with opportunity to observe patient.    Report given to Nidia MARTINEZ RN & ARIAS Dumont RN   12/16/2020  7:18 AM

## 2020-12-16 NOTE — PHARMACY
[facility]    Pharmacy      Antimicrobial Stewardship Note     Current antimicrobial therapy:  Anti-infectives (From now, onward)    Start     Dose/Rate Route Frequency Ordered Stop    12/14/20 1400  azithromycin (ZITHROMAX) 250 mg in sodium chloride 0.9 % 250 mL intermittent infusion      250 mg  over 1 Hours Intravenous EVERY 24 HOURS 12/13/20 1440      12/13/20 1500  ceFEPIme (MAXIPIME) 2-5 GM-%(50ML) intermittent infusion 2 g      2 g  over 30 Minutes Intravenous EVERY 8 HOURS 12/13/20 1440            Indication: CAP    Days of Therapy: 4     Pertinent labs:  Creatinine   Creatinine   Date Value Ref Range Status   12/16/2020 0.69 0.66 - 1.25 mg/dL Final   12/15/2020 0.74 0.66 - 1.25 mg/dL Final   12/14/2020 0.73 0.66 - 1.25 mg/dL Final     WBC   WBC   Date Value Ref Range Status   12/16/2020 20.8 (H) 4.0 - 11.0 10e9/L Final   12/15/2020 24.8 (H) 4.0 - 11.0 10e9/L Final   12/14/2020 24.3 (H) 4.0 - 11.0 10e9/L Final     Procalcitonin   Procalcitonin   Date Value Ref Range Status   12/13/2020 0.08 ng/ml Final     Comment:     0.05-0.24 ng/ml Low risk of systemic bacterial infection. Local bacterial   infection possible.  Recommendation: Assess other clinical features of   infection. Discourage antibiotics unless strong clinical suspicion for serious   infection.       CRP   CRP Inflammation   Date Value Ref Range Status   06/11/2017 5.4 0.0 - 8.0 mg/L Final   10/31/2015 5.8 0.0 - 8.0 mg/L Final   08/31/2015 <2.9 0.0 - 8.0 mg/L Final       Culture Results:        Recommendations/Interventions:  1. None at this time.    Mandy Delcid, [facility]  December 16, 2020

## 2020-12-16 NOTE — PROGRESS NOTES
[facility]    Hospitalist Progress Note    Date of Service (when I saw the patient): 12/16/2020    Assessment & Plan       Community acquired bacterial pneumonia: Left lower opacity plus likely effusion. Symptoms x3-4 weeks prior to presentation. He is still having productive cough. He has not required any oxygen. He has chronic leukocytosis due to CLL, has had a fever, last was 101 yesterday evening. Will continue zithromax and cefepime. Recheck CXR in AM.       Seizure disorder (H): Continue Keppra, denies recent seizure activity.       Wound: Right flank 2-3 black eschar noted with wider areas of fluctuance about 3x4 inches consistent with abscess. Eschar had begun to shrink away revealed fibrinous tissue below. Eschar was removed, with some deep palpation did get some thin pustulant material to come out. The area is not particularly painful, patient completely unaware it was there. Culture was taken.Suspect the wider areas will need to be drained as this is possible source of otherwise unfounded fever.     Left upper back pain: Notes x1 month. Not pleuritic or spasmotic  in nature. Tender to touch. He denies any known injury but again is a very poor historian.  Repeat CXR done this morning shows worsening left effusion, consider CT scan but he is not more short of breath, is not hypoxic.       Hemolytic anemia associated with lymphoproliferative disorder (H): Hgb 6.6 on arrival so received 2 units PRBC's. Hemoglobin has been stable since, no indications of acute blood loss.       Bipolar disorder, unspecified (H): Continue home medications as prescribed.      Diabetes mellitus, type II (H): Controlled on oral Actos, continue same. Will get QID sugars while here, he does not check sugars at home.       CLL (chronic lymphocytic leukemia) (H)    At high risk for infection due to chemotherapy      DVT Prophylaxis: Pneumatic Compression Devices  Code Status: No CPR- Pre-arrest intubation  OK    Disposition: Expected discharge in 2-3 days once fevers resolved x24-48 hours.    Astrid Banuelos, CNP    Interval History    C/O continued overall malaise. Denies chest pain this morning, not dyspneic, eating well.      -Data reviewed today: I reviewed all new labs and imaging results over the last 24 hours.     Physical Exam   Temp: 98.3  F (36.8  C) Temp src: Tympanic BP: 112/85 Pulse: 83   Resp: 20 SpO2: 93 % O2 Device: None (Room air)    Vitals:    12/13/20 1159 12/13/20 1411 12/16/20 0548   Weight: 86.2 kg (190 lb) 81.9 kg (180 lb 8.9 oz) 83.1 kg (183 lb 3.2 oz)     Vital Signs with Ranges  Temp:  [98.3  F (36.8  C)-101.4  F (38.6  C)] 98.3  F (36.8  C)  Pulse:  [] 83  Resp:  [20-21] 20  BP: (103-138)/(55-85) 112/85  SpO2:  [91 %-96 %] 93 %  I/O last 3 completed shifts:  In: 1866 [P.O.:480; I.V.:1386]  Out: 1250 [Urine:1250]    Peripheral IV 12/14/20 Anterior;Right Upper forearm (Active)   Site Assessment WDL 12/16/20 0900   Line Status Saline locked 12/16/20 0900   Phlebitis Scale 0-->no symptoms 12/16/20 0900   Infiltration Scale 0 12/15/20 1546   Infiltration Site Treatment Method  None 12/15/20 1500   Number of days: 2     Line/device assessment(s) completed for medical necessity    Constitutional: Awake,alert, no acute distress  Respiratory: Diminished left mid-base, better air movement noted rest of his air fields though still diminished.  Cardiovascular: HRR, no murmurs, rubs,thrills.   GI: Soft,nontender, bowel sounds are positive.   Skin/Integumen: No unusual rashes, open areas or bruising except below.   Other: Right flank eschar removed 2-3sm diameter with fluctuant area below 3-4in in diameter        Medications       azithromycin  250 mg Intravenous Q24H     ceFEPIme (MAXIPIME) IV  2 g Intravenous Q8H     citalopram  60 mg Oral Daily     hydrocortisone  25 mg Rectal BID     levETIRAcetam  500 mg Oral BID     omeprazole  20 mg Oral QAM     pioglitazone  15 mg Oral Daily      QUEtiapine  100 mg Oral At Bedtime     simvastatin  20 mg Oral QPM     sodium chloride (PF)  3 mL Intracatheter Q8H       Data   Recent Labs   Lab 12/16/20  0517 12/15/20  0521 12/14/20  0520 12/13/20  1205 12/13/20  1205   WBC 20.8* 24.8* 24.3*  --  38.0*   HGB 7.9* 8.2* 7.9*   < > 6.6*   * 102* 100  --  107*    300 286  --  445    141 140  --  138   POTASSIUM 3.6 3.7 3.8  --  3.8   CHLORIDE 109 110* 109  --  104   CO2 27 25 26  --  26   BUN 16 14 17  --  17   CR 0.69 0.74 0.73  --  0.89   ANIONGAP 5 6 5  --  8   IVON 7.8* 7.9* 7.8*  --  8.3*   * 112* 121*  --  141*   ALBUMIN  --   --   --   --  2.1*   PROTTOTAL  --   --   --   --  7.1   BILITOTAL  --   --   --   --  0.5   ALKPHOS  --   --   --   --  235*   ALT  --   --   --   --  21   AST  --   --   --   --  40    < > = values in this interval not displayed.       Recent Results (from the past 24 hour(s))   XR Chest 2 Views    Narrative    PROCEDURE: XR CHEST 2 VW 12/16/2020 8:06 AM    HISTORY: recheck left pleural effusion /pneumonia    COMPARISONS: 12/13/2020.    TECHNIQUE: 2 views.    FINDINGS: The amount of fluid in the left pleural space has increased.  There is no fairly large left-sided effusion. There is some adjacent  atelectasis or infiltrate in the left lower lobe.    Heart is stable in size. Mild interstitial prominence is stable. No  right-sided effusion is seen.         Impression    IMPRESSION: Increase in size of left pleural effusion.    DINA GANDHI MD

## 2020-12-16 NOTE — PROGRESS NOTES
12/16/20 1500   Signing Clinician's Name / Credentials   Signing clinician's name / credentials Gini Gudino PTA   Quick Adds   Rehab Discipline PT   PT Assistant Visit Number 3   Therapeutic Activity   Minutes of Treatment 15 minutes   Symptoms Noted During/After Treatment Fatigue;Shortness of breath   Treatment Detail Pt ambulated 100 feet x2 Fww CGA1 with cues when turning around to keep the walker on the ground at all times. Supine to sit SBA1 sit to stand SBA1.    Therapeutic Exercise   Minutes of Treatment 10   Symptoms Noted During/After Treatment fatigue;increased pain   Treatment Detail Seated LAQ'S,marching, heel toe raises, VMO with pillow squeezes.    PT Discharge Planning    PT Rationale for DC Rec Pt would benefit from home PT for safety assessment and improving activity tolerance   PT Brief overview of current status  Pt supine in bed at start of treatment and needed encouragement to participate. Pt transferred to edge of bed independently. Completed transfers c supervision using FWW. Ambulated 150' c FWW and superivison. Pt was steady and safe with walker but demonstrates decreased activity tolerance. Following ambulated SpO2 95%,  and pt sititng in recliner , OT working with pt   Additional Documentation   PT Plan Progress mobility as tolerated   Total Session Time   Total Session Time (minutes) 25 minutes

## 2020-12-16 NOTE — PROGRESS NOTES
12/16/20 1005   Signing Clinician's Name / Credentials   Signing clinician's name / credentials Gini Gudino PTA   Quick Adds   Rehab Discipline PT   PT Assistant Visit Number 2   Therapeutic Activity   Minutes of Treatment 15 minutes   Symptoms Noted During/After Treatment Shortness of breath;Increased pain   Treatment Detail Pt ambulated with FWW w/c behind 80 feet x2 CGa1. Pt attempted to use the bathroom to void but was unable to urinate. Pt was in bed supine to sit SBA1 sit to stand SBA1.    Therapeutic Exercise   Minutes of Treatment 10   Symptoms Noted During/After Treatment fatigue   Treatment Detail Seated exericses 10 reps: LAQ'S heel toe raises, VMO, glut sets.    PT Discharge Planning    PT Discharge Recommendation (DC Rec) home with home care physical therapy   PT Rationale for DC Rec Pt would benefit from home PT for safety assessment and improving activity tolerance   PT Brief overview of current status  Pt supine in bed at start of treatment and needed encouragement to participate. Pt transferred to edge of bed independently. Completed transfers c supervision using FWW. Ambulated 150' c FWW and superivison. Pt was steady and safe with walker but demonstrates decreased activity tolerance. Following ambulated SpO2 95%,  and pt sititng in recliner , OT working with pt   Additional Documentation   PT Plan Progress mobility as tolerated   Total Session Time   Total Session Time (minutes) 25 minutes      INTERNAL MEDICINE RESIDENCY DISCHARGE SUMMARY     [facility] Shameka   71 y o  female  MRN: [id number]  Room/Bed: /-01     [facility]    Encounter: [id number]    Active Problems:    Asthma exacerbation ([address] ) (resolved)    Bipolar disorder     GERD (gastroesophageal reflux disease)    Asthma exacerbation (resolved):  SOB on presentation, likely secondary to asthma exacerbation  Possibly in the setting of influenza versus viral URI  Procalcitonin and chest x-ray negative  Peak flow 240   - continue albuterol PRN  - start prednisone 40 mg daily x 4 days, fluticasone inhaler 2 puffs daily  - patient declines influenza vaccination and pneumovax       Bipolar disorder:  Will controlled this time  - continue home lithium      GERD: chronic stable condition  - continue Kavitha is a 70 y/o female with past medical history of asthma, bipolar disorder, and GERD who presented to the ED for evaluation of SOB and cough  Patient denies history of asthma exacerbation requiring intubation or hospitalization  She reports that she felt well prior to going to bed the day before but then subsequently developed cough, fever, congestion, shortness of breath the following morning  Cough productive of yellow sputum without blood  She tried using her home beta agonist inhaler without significant relief  She does report she smoked a few cigarettes daily during her college years but states that she has not had a cigarette in decades  She has not had a flu shot at this time  She denies nausea, vomiting, diarrhea  States that she feels feverish and has a home temperature of 99°  She does have recent sick contacts at work      In the ED vital signs unremarkable, breathing room air  WBC 10 4  Procalcitonin negative  Chest x-ray without acute issue   Patient was given Atrovent and albuterol neb in the ED was well as IV steroids without significant relief of symptoms  Patient with symptom improvement overnight after multiple breathing treatments  Patient verbalizes wanting to be discharged home  There were no critical events during this hospitalization  Plan to add fluticasone inhaler and a 4 day course of prednisone 40 mg to the patient's regimen on discharge  Discussed the plan of care with the patient and she understands and agrees  She will follow up with the [address] [place] [address] within the next 2 weeks  Physical Exam   Constitutional: She is oriented to person, place, and time  She appears well-developed and well-nourished  No distress  HENT:   Head: Normocephalic and atraumatic  Mouth/Throat: Oropharynx is clear and moist    Eyes: Pupils are equal, round, and reactive to light  EOM are normal    Neck: Normal range of motion  Neck supple  No JVD present  No tracheal deviation present  Cardiovascular: Normal rate, regular rhythm, normal heart sounds and intact distal pulses  No murmur heard  Pulmonary/Chest: Effort normal and breath sounds normal  She has no rales  Minimal faint end-expiratory wheezing, improved after breathing treatment this morning  Abdominal: Soft  Bowel sounds are normal  She exhibits no distension  There is no tenderness  There is no guarding  Musculoskeletal: Normal range of motion  She exhibits no edema  Neurological: She is alert and oriented to person, place, and time  Strength and sensation grossly intact and symmetric  Skin: Skin is warm and dry  Capillary refill takes less than 2 seconds  Nursing note and vitals reviewed        DISCHARGE INFORMATION     PCP at Discharge: mAy Bray MD    Admitting Provider: Ashley Deleon MD  Admission Date: 10/24/2019    Discharge Provider: No att  providers found  Discharge Date: 10/25/2019    Discharge Disposition: Home/Self Care  Discharge Condition: good  Discharge with Lines: no    Discharge Diet: regular diet  Activity Restrictions: none  Test Results Pending at Discharge: TSH, 3rd generation with free T4 reflex    Discharge Diagnoses:  Principal Problem (Resolved): Asthma exacerbation  Active Problems:    Bipolar disorder ([address] )    GERD (gastroesophageal reflux disease)  Resolved Problems:    SOB (shortness of breath)      Consulting Providers:      Diagnostic & Therapeutic Procedures Performed:  Xr Chest 2 Views    Result Date: 10/24/2019  Impression: Stable exam   No acute cardiopulmonary disease  Workstation performed: [id number]       Code Status: Level 1 - Full Code  Advance Directive & Living Will: <no information>  Power of :    POLST:      Medications:  Current Discharge Medication List        Current Discharge Medication List      START taking these medications    Details   albuterol (PROVENTIL HFA,VENTOLIN HFA) 90 mcg/act inhaler Inhale 2 puffs every 4 (four) hours as needed for wheezing  Qty: 18 g, Refills: 2    Comments: Substitution to a formulary equivalent within the same pharmaceutical class is authorized  Associated Diagnoses: Mild intermittent asthma with exacerbation      fluticasone (FLOVENT HFA) 220 mcg/act inhaler Inhale 2 puffs 2 (two) times a day Rinse mouth after use    Qty: 3 Inhaler, Refills: 0    Associated Diagnoses: Mild intermittent asthma with exacerbation      predniSONE 20 mg tablet Take 2 tablets (40 mg total) by mouth daily  Qty: 8 tablet, Refills: 0    Associated Diagnoses: Mild intermittent asthma with exacerbation           Current Discharge Medication List      CONTINUE these medications which have NOT CHANGED    Details   diphenhydrAMINE (BENADRYL) 25 mg tablet Take 25 mg by mouth daily at bedtime      gabapentin (NEURONTIN) 300 mg capsule Take 300 mg by mouth daily      lithium carbonate 300 mg capsule Take 300 mg by mouth daily at bedtime      LORazepam (ATIVAN) 1 mg tablet Take 1 5 mg by mouth daily at bedtime      omeprazole (PriLOSEC) 20 mg delayed release capsule Take 20 mg by mouth daily sertraline (ZOLOFT) 100 mg tablet Take 150 mg by mouth daily      traZODone (DESYREL) 100 mg tablet Take 200 mg by mouth daily at bedtime      ketorolac (TORADOL) 10 mg tablet Take 1 tablet (10 mg total) by mouth every 6 (six) hours as needed for moderate pain for up to 5 days  Qty: 20 tablet, Refills: 0    Associated Diagnoses: Chest pain             Allergies: Allergies   Allergen Reactions    Adderall [Amphetamine-Dextroamphetamine]      Chest pain- was placed on Adderall after son passed away for depression, and she developed chest pain in 1990's; she had full cardiac work up, and was negative, so she has allergy to Adderall    Molds & Smuts     Other      Cats    Sulfa Antibiotics Other (See Comments)       FOLLOW-UP     PCP Outpatient Follow-up:  Anna Yang PA-C   Internal Medicine  Physician Assistant [phone] [address] [place] [address]     Next Steps: Schedule an appointment as soon as possible for a visit in 2 week(s)      Instructions: Please call to make appointment within 2 weeks with Dr Mamie Khanna if possible      [facility]    [phone]             Consulting Providers Follow-up:  none required     Active Issues Requiring Follow-up:   Asthma exacerbation    Discharge Statement:   I spent 30 minutes minutes discharging the patient  This time was spent on the day of discharge  I had direct contact with the patient on the day of discharge  Additional documentation is required if more than 30 minutes were spent on discharge  Portions of the record may have been created with voice recognition software  Occasional wrong word or "sound a like" substitutions may have occurred due to the inherent limitations of voice recognition software    Read the chart carefully and recognize, using context, where substitutions have occurred     ==  [facility]  Internal Medicine Student MS-4

## 2020-12-16 NOTE — CONSULTS
[facility] Surgery Consultation    CC:  Fever, right flank skin lesion     HPI:  This 65 year old year old male is seen at the request of Astrid Banuelos for evaluation of right flank skin lesion. He has known CLL which he is on oral chemotherapy for. He was admitted recently for pneumonia. He denies pain at the site. Bedside debridement performed to remove eschar, there was noted to be some clear drainage noted at that time with some induration.     Past Medical History:   Diagnosis Date     Asthma      Bipolar affective disorder (H)      Cannabis abuse      Chronic lymphatic leukemia (H)      DM (diabetes mellitus) (H)      Dyslipidemia      Hematemesis      HTN (hypertension)      Seizure disorder (H)      Seizures (H)      Status epilepticus (H)      TBI (traumatic brain injury) (H)        Past Surgical History:   Procedure Laterality Date     C REPAIR SHOULDER CAPSULE,ANTERIOR      Left     C TOTAL HIP ARTHROPLASTY      Right     FOREARM SURGERY      Left     TONSILLECTOMY         Allergies   Allergen Reactions     Blood Transfusion Related (Informational Only) Other (See Comments)     Patient has a history of a warm autoantibody.  A delay in compatible RBCs may occur.     Sulfa Drugs        Current Facility-Administered Medications   Medication     acetaminophen (TYLENOL) tablet 650 mg     azithromycin (ZITHROMAX) 250 mg in sodium chloride 0.9 % 250 mL intermittent infusion     ceFEPIme (MAXIPIME) 2-5 GM-%(50ML) intermittent infusion 2 g     citalopram (celeXA) tablet 60 mg     hydrocortisone (ANUSOL-HC) Suppository 25 mg     levETIRAcetam (KEPPRA) tablet 500 mg     lidocaine (LMX4) kit     lidocaine 1 % 0.1-1 mL     melatonin tablet 1 mg     menthol-zinc oxide (CALMOSEPTINE) 0.44-20.6 % ointment OINT     naloxone (NARCAN) injection 0.2 mg    Or     naloxone (NARCAN) injection 0.4 mg    Or     naloxone (NARCAN) injection 0.2 mg    Or     naloxone (NARCAN) injection 0.4 mg     omeprazole (priLOSEC) CR  capsule 20 mg     ondansetron (ZOFRAN-ODT) ODT tab 4 mg    Or     ondansetron (ZOFRAN) injection 4 mg     pioglitazone (ACTOS) tablet 15 mg     polyethylene glycol (MIRALAX) Packet 17 g     QUEtiapine (SEROquel) tablet 100 mg     simvastatin (ZOCOR) tablet 20 mg     sodium chloride (PF) 0.9% PF flush 3 mL     sodium chloride (PF) 0.9% PF flush 3 mL       HABITS:    Social History     Tobacco Use     Smoking status: Current Every Day Smoker     Packs/day: 1.00     Types: Cigarettes     Smokeless tobacco: Never Used   Substance Use Topics     Alcohol use: No     Alcohol/week: 0.0 standard drinks     Drug use: Yes     Types: Marijuana     Comment: marijuana       Family History   Problem Relation Age of Onset     Other Cancer Mother        REVIEW OF SYSTEMS:  Ten point review of systems negative except those mentioned in the HPI.     OBJECTIVE:    /84 (BP Location: Left arm)   Pulse 95   Temp 99.2  F (37.3  C) (Tympanic)   Resp 20   Ht 1.829 m (6')   Wt 83.1 kg (183 lb 3.2 oz)   SpO2 97%   BMI 24.85 kg/m      GENERAL: Generally appears well, in no distress with appropriate affect.  HEENT:   Sclerae anicteric - normocephalic atraumatic   Respiratory:  No acute distress, no splinting   Cardiovascular:  Regular Rate and Rhythm  Abdomen: right flank there is open wound with dry base. Probed at bedside without tracking, mild induration, some erythema.   :  deferred  Extremities:  Extremities normal. No deformities, edema, or skin discoloration.  Skin:  no suspicious lesions or rashes  Neurological: grossly intact  Psych:  Alert, oriented, affect appropriate with normal decision making ability.    IMPRESSION:    65 y.o. male with persistent fever on broad spectrum antibiotics. There was concern about a potential subcutaneous abscess as reason for persistent fevers. I did examine and can not see an obvious infection that is not currently un drained on right flank . He does have a substantial para-pneumonic  effusion on the left side on chest XR. Consider Chest CT and possible IR guided pigtail to try to prevent entrapment of his left lung. Could culture as well for possible source.     PLAN:    Consider large effusion of left lung as source of fever   Will continue to follow   Bacitracin to open wound on right flank     Kedar Tolbert MD,     12/16/2020  3:45 PM

## 2020-12-16 NOTE — PROVIDER NOTIFICATION
Dr. Perez notified of irreg HR upon assessment , pt asymptomatic. No new orders rec'd at this time.

## 2020-12-16 NOTE — PLAN OF CARE
VSS with exception of fever. T-max 101.4. Tylenol given x1. Less confused and impulsive this shift than yesterday but still occasionally jumps up to urinate. Sats low to mid 90s on RA. More frequent congested cough. LS coarse throughout. No increased respiratory effort. HR reg, BS active-hyperactive at times. Good appetite and fluid intake. Skin has many bruises and scabs throughout. Applied calmoseptine to buttocks and rectum for pain/itching with good results. Alarms on, room near unit station, call light in reach.       Face to face report given with opportunity to observe patient.    Report given to ARIAS Brar   12/15/2020  11:16 PM

## 2020-12-16 NOTE — PROGRESS NOTES
12/16/20 1100   Signing Clinician's Name / Credentials   Signing clinician's name / credentials Marlen Carson OTR/L   Quick Adds   Rehab Discipline OT   Therapeutic Activity   Minutes of Treatment 10 minutes   Symptoms Noted During/After Treatment Fatigue;Shortness of breath   Treatment Detail Pt sitting EOB upon OT arrival. He stated he already washed up and got a clean gown. He was agreeable to changing his socks and brushing his teeth. Pt able to manage bilateral socks with TotalA to doff his R sock, pt states he had a hip replacement years back and occasionally has difficulties. His family can assist at home if needed. Pt transferred sit<>stand from EOB with SBA. He ambulated to the BR with the walker with CGA. He stood at the sink with SBA and brushed his teeth/rinse his mouth. He returned to the bed without the walker despite verbal cues with close CGA. Pt sat EOB but then was agreeable to sitting in the chair and he transferred bed>chair again without the walker with CGA-SBA. Pt provided with lunch menu and was left looking through as he wanted a snack. Pt informed the UA would come and assist him with calling. UA was informed.   OT Discharge Planning    OT Discharge Recommendation (DC Rec) Home with assist;home with home care occupational therapy   OT Rationale for DC Rec Currently requiring CGA-SBA for ADLs but pt demonstrates decreased endurance/activity tolerance.   OT Brief overview of current status  Pt sitting EOB upon OT arrival. He stated he already washed up and got a clean gown. He was agreeable to changing his socks and brushing his teeth. Pt able to manage bilateral socks with TotalA to doff his R sock, pt states he had a hip replacement years back and occasionally has difficulties. His family can assist at home if needed. Pt transferred sit<>stand from EOB with SBA. He ambulated to the BR with the walker with CGA. He stood at the sink with SBA and brushed his teeth/rinse his mouth. He  returned to the bed without the walker despite verbal cues with close CGA. Pt sat EOB but then was agreeable to sitting in the chair and he transferred bed>chair again without the walker with CGA-SBA. Pt provided with lunch menu and was left looking through as he wanted a snack. Pt informed the UA would come and assist him with calling. UA was informed.   Additional Documentation   OT Plan Progress endurance and maximize independence in ADLs, as able.   Total Session Time   Total Session Time (minutes) 10 minutes

## 2020-12-16 NOTE — PLAN OF CARE
VSS. Afebrile throughout the shift. Remains on room air saturating above 90%. Lung sounds coarse. Reports ROSEN. Up as stand by assist. Impulsive at times; alarms remained in place. Pain controlled with scheduled and PRN mediations. Wound located on right flank; blanchable redness and no drainage noted. Cultures sent by provider. IV SL. Remained cooperative with assessments and cares. Call light within reach.     Face to face report given with opportunity to observe patient.    Report given to Zonia Gentile RN   12/16/2020  3:29 PM

## 2020-12-17 ENCOUNTER — APPOINTMENT (OUTPATIENT)
Dept: PHYSICAL THERAPY | Facility: HOSPITAL | Age: 65
DRG: 840 | End: 2020-12-17
Payer: MEDICARE

## 2020-12-17 ENCOUNTER — APPOINTMENT (OUTPATIENT)
Dept: GENERAL RADIOLOGY | Facility: HOSPITAL | Age: 65
DRG: 840 | End: 2020-12-17
Attending: RADIOLOGY
Payer: MEDICARE

## 2020-12-17 ENCOUNTER — APPOINTMENT (OUTPATIENT)
Dept: ULTRASOUND IMAGING | Facility: HOSPITAL | Age: 65
DRG: 840 | End: 2020-12-17
Attending: NURSE PRACTITIONER
Payer: MEDICARE

## 2020-12-17 ENCOUNTER — APPOINTMENT (OUTPATIENT)
Dept: OCCUPATIONAL THERAPY | Facility: HOSPITAL | Age: 65
DRG: 840 | End: 2020-12-17
Payer: MEDICARE

## 2020-12-17 LAB
ANION GAP SERPL CALCULATED.3IONS-SCNC: 5 MMOL/L (ref 3–14)
APPEARANCE FLD: NORMAL
BUN SERPL-MCNC: 13 MG/DL (ref 7–30)
CALCIUM SERPL-MCNC: 7.7 MG/DL (ref 8.5–10.1)
CHLORIDE SERPL-SCNC: 111 MMOL/L (ref 94–109)
CO2 SERPL-SCNC: 26 MMOL/L (ref 20–32)
COLOR FLD: YELLOW
CREAT SERPL-MCNC: 0.61 MG/DL (ref 0.66–1.25)
ERYTHROCYTE [DISTWIDTH] IN BLOOD BY AUTOMATED COUNT: 18.9 % (ref 10–15)
GFR SERPL CREATININE-BSD FRML MDRD: >90 ML/MIN/{1.73_M2}
GLUCOSE BLDC GLUCOMTR-MCNC: 152 MG/DL (ref 70–99)
GLUCOSE BLDC GLUCOMTR-MCNC: 193 MG/DL (ref 70–99)
GLUCOSE FLD-MCNC: 115 MG/DL
GLUCOSE SERPL-MCNC: 133 MG/DL (ref 70–99)
HCT VFR BLD AUTO: 24.1 % (ref 40–53)
HGB BLD-MCNC: 7.4 G/DL (ref 13.3–17.7)
INR PPP: 1.27 (ref 0.86–1.14)
LDH FLD L TO P-CCNC: 430 U/L
LDH SERPL L TO P-CCNC: 185 U/L (ref 85–227)
MCH RBC QN AUTO: 31.4 PG (ref 26.5–33)
MCHC RBC AUTO-ENTMCNC: 30.7 G/DL (ref 31.5–36.5)
MCV RBC AUTO: 102 FL (ref 78–100)
MONOS+MACROS NFR FLD MANUAL: 95 %
NEUTS BAND NFR FLD MANUAL: 5 %
PH FLD: 7 PH
PLATELET # BLD AUTO: 293 10E9/L (ref 150–450)
PLATELET # BLD AUTO: 300 10E9/L (ref 150–450)
POTASSIUM SERPL-SCNC: 3.6 MMOL/L (ref 3.4–5.3)
PROT FLD-MCNC: 3.5 G/DL
PROT SERPL-MCNC: 6 G/DL (ref 6.8–8.8)
RBC # BLD AUTO: 2.36 10E12/L (ref 4.4–5.9)
RBC # FLD: 7035 /UL
SODIUM SERPL-SCNC: 142 MMOL/L (ref 133–144)
SPECIMEN SOURCE FLD: NORMAL
WBC # BLD AUTO: 18.9 10E9/L (ref 4–11)
WBC # FLD AUTO: 2082 /UL

## 2020-12-17 PROCEDURE — 36415 COLL VENOUS BLD VENIPUNCTURE: CPT | Performed by: INTERNAL MEDICINE

## 2020-12-17 PROCEDURE — 999N001017 HC STATISTIC GLUCOSE BY METER IP

## 2020-12-17 PROCEDURE — 36415 COLL VENOUS BLD VENIPUNCTURE: CPT | Performed by: NURSE PRACTITIONER

## 2020-12-17 PROCEDURE — 272N000042 US THORACENTESIS

## 2020-12-17 PROCEDURE — 97110 THERAPEUTIC EXERCISES: CPT | Mod: GO

## 2020-12-17 PROCEDURE — 99232 SBSQ HOSP IP/OBS MODERATE 35: CPT | Performed by: NURSE PRACTITIONER

## 2020-12-17 PROCEDURE — 999N000065 XR CHEST 1 VW

## 2020-12-17 PROCEDURE — 250N000011 HC RX IP 250 OP 636: Performed by: INTERNAL MEDICINE

## 2020-12-17 PROCEDURE — 97530 THERAPEUTIC ACTIVITIES: CPT | Mod: GP

## 2020-12-17 PROCEDURE — 88108 CYTOPATH CONCENTRATE TECH: CPT | Mod: TC | Performed by: NURSE PRACTITIONER

## 2020-12-17 PROCEDURE — 84157 ASSAY OF PROTEIN OTHER: CPT | Performed by: NURSE PRACTITIONER

## 2020-12-17 PROCEDURE — 87205 SMEAR GRAM STAIN: CPT | Performed by: NURSE PRACTITIONER

## 2020-12-17 PROCEDURE — 89051 BODY FLUID CELL COUNT: CPT | Performed by: NURSE PRACTITIONER

## 2020-12-17 PROCEDURE — 82945 GLUCOSE OTHER FLUID: CPT | Performed by: NURSE PRACTITIONER

## 2020-12-17 PROCEDURE — 93010 ELECTROCARDIOGRAM REPORT: CPT | Performed by: INTERNAL MEDICINE

## 2020-12-17 PROCEDURE — 999N001014 HC STATISTIC CYTO WRIGHT STAIN TC: Performed by: NURSE PRACTITIONER

## 2020-12-17 PROCEDURE — 87075 CULTR BACTERIA EXCEPT BLOOD: CPT | Performed by: NURSE PRACTITIONER

## 2020-12-17 PROCEDURE — 83986 ASSAY PH BODY FLUID NOS: CPT | Performed by: NURSE PRACTITIONER

## 2020-12-17 PROCEDURE — 258N000003 HC RX IP 258 OP 636: Performed by: NURSE PRACTITIONER

## 2020-12-17 PROCEDURE — 120N000001 HC R&B MED SURG/OB

## 2020-12-17 PROCEDURE — 250N000009 HC RX 250: Performed by: RADIOLOGY

## 2020-12-17 PROCEDURE — 83615 LACTATE (LD) (LDH) ENZYME: CPT | Performed by: NURSE PRACTITIONER

## 2020-12-17 PROCEDURE — 250N000013 HC RX MED GY IP 250 OP 250 PS 637: Performed by: INTERNAL MEDICINE

## 2020-12-17 PROCEDURE — 88305 TISSUE EXAM BY PATHOLOGIST: CPT | Mod: TC | Performed by: NURSE PRACTITIONER

## 2020-12-17 PROCEDURE — 80048 BASIC METABOLIC PNL TOTAL CA: CPT | Performed by: INTERNAL MEDICINE

## 2020-12-17 PROCEDURE — 250N000011 HC RX IP 250 OP 636: Performed by: NURSE PRACTITIONER

## 2020-12-17 PROCEDURE — 85049 AUTOMATED PLATELET COUNT: CPT | Performed by: NURSE PRACTITIONER

## 2020-12-17 PROCEDURE — 85610 PROTHROMBIN TIME: CPT | Performed by: NURSE PRACTITIONER

## 2020-12-17 PROCEDURE — 87070 CULTURE OTHR SPECIMN AEROBIC: CPT | Performed by: NURSE PRACTITIONER

## 2020-12-17 PROCEDURE — 999N001018 HC STATISTIC H-CELL BLOCK W/STAIN: Performed by: NURSE PRACTITIONER

## 2020-12-17 PROCEDURE — 85027 COMPLETE CBC AUTOMATED: CPT | Performed by: INTERNAL MEDICINE

## 2020-12-17 PROCEDURE — 99232 SBSQ HOSP IP/OBS MODERATE 35: CPT | Performed by: SURGERY

## 2020-12-17 PROCEDURE — 93005 ELECTROCARDIOGRAM TRACING: CPT

## 2020-12-17 PROCEDURE — 250N000013 HC RX MED GY IP 250 OP 250 PS 637: Performed by: NURSE PRACTITIONER

## 2020-12-17 PROCEDURE — 84155 ASSAY OF PROTEIN SERUM: CPT | Performed by: NURSE PRACTITIONER

## 2020-12-17 RX ORDER — LIDOCAINE HYDROCHLORIDE 10 MG/ML
INJECTION, SOLUTION EPIDURAL; INFILTRATION; INTRACAUDAL; PERINEURAL
Status: DISCONTINUED
Start: 2020-12-17 | End: 2020-12-17 | Stop reason: HOSPADM

## 2020-12-17 RX ORDER — DEXTROSE MONOHYDRATE 25 G/50ML
25-50 INJECTION, SOLUTION INTRAVENOUS
Status: CANCELLED | OUTPATIENT
Start: 2020-12-17

## 2020-12-17 RX ORDER — NICOTINE POLACRILEX 4 MG
15-30 LOZENGE BUCCAL
Status: CANCELLED | OUTPATIENT
Start: 2020-12-17

## 2020-12-17 RX ORDER — LIDOCAINE HYDROCHLORIDE 10 MG/ML
10 INJECTION, SOLUTION EPIDURAL; INFILTRATION; INTRACAUDAL; PERINEURAL ONCE
Status: DISCONTINUED | OUTPATIENT
Start: 2020-12-17 | End: 2021-03-23 | Stop reason: HOSPADM

## 2020-12-17 RX ADMIN — LEVETIRACETAM 500 MG: 500 TABLET, FILM COATED ORAL at 21:02

## 2020-12-17 RX ADMIN — OMEPRAZOLE 20 MG: 20 CAPSULE, DELAYED RELEASE ORAL at 08:39

## 2020-12-17 RX ADMIN — AZITHROMYCIN 250 MG: 500 INJECTION, POWDER, LYOPHILIZED, FOR SOLUTION INTRAVENOUS at 13:54

## 2020-12-17 RX ADMIN — CEFEPIME HYDROCHLORIDE 2 G: 2 INJECTION, SOLUTION INTRAVENOUS at 01:04

## 2020-12-17 RX ADMIN — CEFEPIME HYDROCHLORIDE 2 G: 2 INJECTION, SOLUTION INTRAVENOUS at 16:47

## 2020-12-17 RX ADMIN — SIMVASTATIN 20 MG: 10 TABLET, FILM COATED ORAL at 21:02

## 2020-12-17 RX ADMIN — LEVETIRACETAM 500 MG: 500 TABLET, FILM COATED ORAL at 08:39

## 2020-12-17 RX ADMIN — QUETIAPINE 100 MG: 100 TABLET, FILM COATED ORAL at 21:02

## 2020-12-17 RX ADMIN — HYDROCORTISONE ACETATE 25 MG: 25 SUPPOSITORY RECTAL at 08:40

## 2020-12-17 RX ADMIN — LIDOCAINE HYDROCHLORIDE 8 ML: 10 INJECTION, SOLUTION EPIDURAL; INFILTRATION; INTRACAUDAL; PERINEURAL at 12:57

## 2020-12-17 RX ADMIN — CITALOPRAM HYDROBROMIDE 60 MG: 20 TABLET ORAL at 08:38

## 2020-12-17 RX ADMIN — CEFEPIME HYDROCHLORIDE 2 G: 2 INJECTION, SOLUTION INTRAVENOUS at 08:40

## 2020-12-17 RX ADMIN — BACITRACIN: 500 OINTMENT TOPICAL at 08:39

## 2020-12-17 RX ADMIN — ACETAMINOPHEN 650 MG: 325 TABLET, FILM COATED ORAL at 13:57

## 2020-12-17 RX ADMIN — ANORECTAL OINTMENT: 15.7; .44; 24; 20.6 OINTMENT TOPICAL at 08:46

## 2020-12-17 RX ADMIN — HYDROCORTISONE ACETATE 25 MG: 25 SUPPOSITORY RECTAL at 21:02

## 2020-12-17 RX ADMIN — BACITRACIN: 500 OINTMENT TOPICAL at 21:00

## 2020-12-17 RX ADMIN — PIOGLITAZONE 15 MG: 15 TABLET ORAL at 08:39

## 2020-12-17 ASSESSMENT — ACTIVITIES OF DAILY LIVING (ADL)
ADLS_ACUITY_SCORE: 15

## 2020-12-17 NOTE — PROGRESS NOTES
"   12/17/20 1300   Signing Clinician's Name / Credentials   Signing clinician's name / credentials Marlen Carson OTR/L   Quick Adds   Rehab Discipline OT   Therapeutic Exercise   Minutes of Treatment 10   Symptoms Noted During/After Treatment fatigue;shortness of breath   Treatment Detail Pt sitting in the chair upon OT arrival. His breakfast tray was on the table but he was finished. Pt needing encouragement to participate and was offered for OT to return later but he stated \"let's just get it done with now\". Pt participated in BUE AROM/strengthening including shoulder flex, punch outs, elbow flex/ext, forearm pro/sup, wrist flex/ext, and finger flex/ext x10 reps each and chair pushups x5. Pt was quite fatigued and SOB. SpO2 92-93%, . Pt left with call light within reach and clip alert attached.   OT Discharge Planning    OT Discharge Recommendation (DC Rec) Home with assist;home with home care occupational therapy   OT Rationale for DC Rec Currently requiring CGA-SBA for ADLs but pt demonstrates decreased endurance/activity tolerance.   OT Brief overview of current status  Pt sitting in the chair upon OT arrival. His breakfast tray was on the table but he was finished. Pt needing encouragement to participate and was offered for OT to return later but he stated \"let's just get it done with now\". Pt participated in BUE AROM/strengthening including shoulder flex, punch outs, elbow flex/ext, forearm pro/sup, wrist flex/ext, and finger flex/ext x10 reps each and chair pushups x5. Pt was quite fatigued and SOB. SpO2 92-93%, . Pt left with call light within reach and clip alert attached.   Additional Documentation   OT Plan Progress endurance and maximize independence in ADLs, as able.   Total Session Time   Total Session Time (minutes) 10 minutes     "

## 2020-12-17 NOTE — PLAN OF CARE
Temp of 100 this afternoon; PRN tylenol given. Otherwise VSS. Remained on room air saturating above 90%. No reports of pain or discomfort. Alert and orientated. Alarms in place and utilized call light throughout the shift. Thoracentesis performed this afternoon. Site clean dry and intact; located on left side of back. Covered with clear dressing.  and 160s.     Face to face report given with opportunity to observe patient.    Report given to Emily Gentile RN   12/17/2020  3:25 PM

## 2020-12-17 NOTE — PLAN OF CARE
Patient without complaints.  Rouses upon entering.   VSS.  T- 99.3 after tylenol.  Denies SOB, Nausea and vomiting. Dressing to thoracentesis site dry and intact. Patient complaining of being tired.   Denies further needs.  Face to face report given with opportunity to observe patient.    Report given to Taylor Cruz RN   12/17/2020  7:25 PM

## 2020-12-17 NOTE — PROGRESS NOTES
Procedure: US Thoracentesis, left    There were  no complications and patient has no symptoms..      Tolerated procedure well.    Patient to Procedure Room.  Sitting for procedure.  Left  lung draining dark leatha fluid.  Total fluid 750 ml.  Fluid will be sent to lab.  Patient to X-ray for post procedure films.      Discharge to home once radiologist has reviewed the films and instructions given.    Radiologist:Dr. Landon    Time Out: Prior to the start of the procedure and with procedural staff participation, I verbally confirmed the patient s identity using two indicators, relevant allergies, that the procedure was appropriate and matched the consent or emergent situation, and that the correct equipment/implants were available. Immediately prior to starting the procedure I conducted the Time Out with the procedural staff and re-confirmed the patient s name, procedure, and site/side. (The Joint Commission universal protocol was followed.)  Yes    Position:  sitting    Pain:  0    Sedation:None. Local Anesthestic used  No sedation    Estimated Blood Loss: Minimal     Condition: Stable    Comments: See dictated procedure note for full details     SONIA BARRETT RN

## 2020-12-17 NOTE — PROGRESS NOTES
Post Thoracentesis - left xray done . Dr Landon - xray ok . Brought patient to med surg [id number]. Report given face to face report to ARIAS Leyva

## 2020-12-17 NOTE — PLAN OF CARE
Pt alert, disoriented to time. VS and assessment as charted. Denies pain. T max 100.8 and rec'd Tylenol per MAR. Lungs remain coarse t/o, on RA with sats >90%, remains ROSEN. IV SL, tolerating a cons cho diet, and encouraging BG 88/137. Open area to R flank with scab forming to wound base and redness surrounding- bacitracin applied per MAR. Free from falls/ injury, call light within reach, alarms activated and bed in lowest position.    Face to face report given with opportunity to observe patient.    Report given to ARIAS Vazquez RN   12/16/2020  10:44 PM

## 2020-12-17 NOTE — PLAN OF CARE
Patient noted to be quite dyspneic after using urinal at bedside -110, sats initially 86% on RA., RR upper 20's. Apical HR irregular. After patient laid down sats did come up to 89-90% on RA. Patient independently performing pursed lip breathing and stated his breathing was ok. EKG obtain because patient's HR was irregular and had been charted as regular since admission. EKG showing SR with PAC's. Will continue to monitor HR and O2.

## 2020-12-17 NOTE — PLAN OF CARE
Highest temp of 100 tonight- down to 99.7 with no intervention. At rest HR 80's and regular. After exertion 's with ectopy per EKG. Sats 92% at rest down to a lowest of 86% with exertion on RA. Lungs dim/coarse left lobes and clear t/o in right lobes. Patient visibly dyspneic with labored breathing after minimal exertion. Urine leatha and odorous. Using urinal at bedside. Hesitancy and strain noted. Fluids encouraged but patient declined. Slept t/o night.

## 2020-12-17 NOTE — PROGRESS NOTES
[facility] Progress Note    S: He is not having pain in his side, fever curve trending down.     # Pain Assessment:  Current Pain Score 2020   Patient currently in pain? denies   Pain score (0-10) -   Pain location -   Pain descriptors -   rFLACC pain score -       O:   Vitals:  BP  Min: 105/92  Max: 149/81  Temp  Av.7  F (37.6  C)  Min: 98.5  F (36.9  C)  Max: 100.8  F (38.2  C)  Pulse  Av.8  Min: 78  Max: 102  I/O last 3 completed shifts:  In: 720 [P.O.:720]  Out: 875 [Urine:875]    Peripheral IV 20 Right Lower forearm (Active)   Site Assessment WDL 20 0103   Line Status Saline locked 20 0205   Phlebitis Scale 0-->no symptoms 20 0103   Infiltration Scale 0 20 0103   Number of days: 1       Wound Other (Comment) Other (comment) (Active)   Wound Bed Pink;Red 20 0734   Anne-wound Assessment Erythema 20 0734   Dressing Open to air 20 0734   Number of days: 1     No line/device    Physical Exam:  General: alert oriented, no acute distress   ENT: sclera non-icteric   ABD: Right sided flank wound non tender. Mild induration   Extremities: WWP     Assessment/Plan:  Right flank wound stable     Left chest loculated effusion was tapped for culture today but no chest tube was left behind. Will discuss with radiology, would like to trial step up approach though if unable to break up loculations than will likely need to transfer for thoracic surgery for open decortication.     Kedar Tolbert MD

## 2020-12-17 NOTE — PROGRESS NOTES
[facility]    Hospitalist Progress Note    Date of Service (when I saw the patient): 12/17/2020    Assessment & Plan       Community acquired bacterial pneumonia: Left lower opacity plus likely effusion. Symptoms x3-4 weeks prior to presentation. He is still having productive cough. He has not required any oxygen. He has chronic leukocytosis due to CLL, has had a fever, last was 101 yesterday evening. Will continue zithromax and cefepime. Recheck CXR in AM.       Pleural effusion: 12/17:Large left effusion that hs grown since original CXR on arrival. CT scan shows it is loculated. However with fever will attempt thoracentesis for at least diagnostic if not both diagnostic and therapeutic. Concern for parapneumonic empyema. He does have dyspnea on exertion but has not had any hypoxia. He has some left flank pain x 1 month.       Seizure disorder (H): Continue Keppra, denies recent seizure activity.       Wound: Right flank 2-3 black eschar noted with wider areas of fluctuance about 3x4 inches consistent with abscess. Eschar had begun to shrink away revealed fibrinous tissue below. Eschar was removed, with some deep palpation did get some thin pustulant material to come out. The area is not particularly painful, patient completely unaware it was there. Culture was taken. Suspect the wider areas will need to be drained as this is possible source of otherwise unfounded fever.   -12/17: Please see consult note.     Left upper back pain: Notes x1 month. Not pleuritic or spasmotic  in nature. Tender to touch. He denies any known injury but again is a very poor historian.  Repeat CXR done this morning shows worsening left effusion, consider CT scan but he is not more short of breath, is not hypoxic.       Hemolytic anemia associated with lymphoproliferative disorder (H): Hgb 6.6 on arrival so received 2 units PRBC's. Hemoglobin has been stable since, no indications of acute blood loss.       Bipolar disorder,  unspecified (H): Continue home medications as prescribed.      Diabetes mellitus, type II (H): Controlled on oral Actos, continue same. Will get QID sugars while here, he does not check sugars at home.       CLL (chronic lymphocytic leukemia) (H)    At high risk for infection due to chemotherapy      DVT Prophylaxis: Pneumatic Compression Devices  Code Status: No CPR- Pre-arrest intubation OK    Disposition: Expected discharge in 2-3 days once fevers resolved x24-48 hours.    Astrid Banuelos, CNP    Interval History    C/O continued overall malaise. Denies chest pain this morning, not dyspneic, eating well.      -Data reviewed today: I reviewed all new labs and imaging results over the last 24 hours.     Physical Exam   Temp: 98.5  F (36.9  C) Temp src: Tympanic BP: 129/74 Pulse: 78   Resp: 22 SpO2: 92 % O2 Device: None (Room air)    Vitals:    12/13/20 1159 12/13/20 1411 12/16/20 0548   Weight: 86.2 kg (190 lb) 81.9 kg (180 lb 8.9 oz) 83.1 kg (183 lb 3.2 oz)     Vital Signs with Ranges  Temp:  [98.5  F (36.9  C)-100.8  F (38.2  C)] 98.5  F (36.9  C)  Pulse:  [] 78  Resp:  [20-26] 22  BP: (118-149)/(69-84) 129/74  SpO2:  [89 %-97 %] 92 %  I/O last 3 completed shifts:  In: 1200 [P.O.:1200]  Out: 675 [Urine:675]    Peripheral IV 12/16/20 Right Lower forearm (Active)   Site Assessment WDL 12/17/20 0103   Line Status Saline locked 12/17/20 0205   Phlebitis Scale 0-->no symptoms 12/17/20 0103   Infiltration Scale 0 12/17/20 0103   Number of days: 1       Wound Other (Comment) Other (comment) (Active)   Wound Bed Pink;Red 12/17/20 0734   Anne-wound Assessment Erythema 12/17/20 0734   Dressing Open to air 12/17/20 0734   Number of days: 1     Line/device assessment(s) completed for medical necessity    Constitutional: Awake,alert, no acute distress  Respiratory: Diminished left mid-base, better air movement noted rest of his air fields though still diminished.  Cardiovascular: HRR, no murmurs, rubs,thrills.   GI:  Soft,nontender, bowel sounds are positive.   Skin/Integumen: No unusual rashes, open areas or bruising except below.   Other: Right flank eschar removed 2-3sm diameter with fluctuant area below 3-4in in diameter        Medications       azithromycin  250 mg Intravenous Q24H     bacitracin   Topical BID     ceFEPIme (MAXIPIME) IV  2 g Intravenous Q8H     citalopram  60 mg Oral Daily     hydrocortisone  25 mg Rectal BID     levETIRAcetam  500 mg Oral BID     omeprazole  20 mg Oral QAM     pioglitazone  15 mg Oral Daily     QUEtiapine  100 mg Oral At Bedtime     simvastatin  20 mg Oral QPM     sodium chloride (PF)  3 mL Intracatheter Q8H       Data   Recent Labs   Lab 12/17/20  1015 12/17/20  0502 12/16/20  0517 12/15/20  0521 12/13/20  1205 12/13/20  1205   WBC  --  18.9* 20.8* 24.8*   < > 38.0*   HGB  --  7.4* 7.9* 8.2*   < > 6.6*   MCV  --  102* 101* 102*   < > 107*    293 306 300   < > 445   INR 1.27*  --   --   --   --   --    NA  --  142 141 141   < > 138   POTASSIUM  --  3.6 3.6 3.7   < > 3.8   CHLORIDE  --  111* 109 110*   < > 104   CO2  --  26 27 25   < > 26   BUN  --  13 16 14   < > 17   CR  --  0.61* 0.69 0.74   < > 0.89   ANIONGAP  --  5 5 6   < > 8   IVON  --  7.7* 7.8* 7.9*   < > 8.3*   GLC  --  133* 121* 112*   < > 141*   ALBUMIN  --   --   --   --   --  2.1*   PROTTOTAL 6.0*  --   --   --   --  7.1   BILITOTAL  --   --   --   --   --  0.5   ALKPHOS  --   --   --   --   --  235*   ALT  --   --   --   --   --  21   AST  --   --   --   --   --  40    < > = values in this interval not displayed.       Recent Results (from the past 24 hour(s))   CT Chest Abodmen w/o Contrast    Narrative    CT CHEST ABDOMEN W/O CONTRAST    CLINICAL HISTORY: Male, age 65 years, Acute resp illness, > 40 years  old; Pleural effusion;    Comparison:  Chest x-ray 12/16/2020    TECHNIQUE:  CT was performed of the chest, abdomen without contrast.   Sagittal, coronal, axial and 3-D MIP reconstructions were reviewed.      FINDINGS:  Chest CT:   There is a large loculated effusion seen throughout the left  hemithorax. No evidence of right-sided pleural effusion.    Peripheral areas of scarring, atelectasis and interstitial thickening  are appreciated. Early fibrotic changes are seen in the dependent  portions of the right lower lobe.    Thyroid gland is normal. Esophagus is patulous without acute  abnormality.    Lymph nodes: There are number of normal-sized nodes throughout the  mediastinum and axillary regions without evidence of pathologic lymph  node enlargement.    Moderate size pericardial effusion is also present. Dense  calcifications are seen within the aortic valve and coronary arteries.    Abdomen/Pelvis CT:  Stomach and duodenum are normal.    The liver is normal. Gallbladder demonstrates radiodense gallstone  with questionable wall thickening. Spleen is normal. Pancreas  demonstrates moderate atrophy.    Adrenal glands: Normal.    Kidneys: Grossly normal.     Visualized portions of the ureters are unremarkable. Visualized  portions of the large and small bowel are unremarkable.    There are numerous normal size and mildly enlarged nodes throughout  the retroperitoneum.      Impression    IMPRESSION:   Large loculated left-sided pleural effusion.    Moderate-sized pericardial effusion.    Numerous normal-sized nodes throughout the mediastinum, hilar regions  as well as the retroperitoneum of the upper abdomen may be reactive in  nature. Patient has a known history of leukemia which also could be  related to these lymph nodes.    An 18 mm peripherally calcified nodule in the AP window may represent  a peripherally calcified lymph node. Although less than likely,  differential diagnosis also includes the possibility of a focal  aneurysm arising from the thoracic aorta.    JAMIE GOETZ MD

## 2020-12-18 ENCOUNTER — TRANSFERRED RECORDS (OUTPATIENT)
Dept: HEALTH INFORMATION MANAGEMENT | Facility: CLINIC | Age: 65
End: 2020-12-18

## 2020-12-18 ENCOUNTER — DOCUMENTATION ONLY (OUTPATIENT)
Dept: INTERVENTIONAL RADIOLOGY/VASCULAR | Facility: HOSPITAL | Age: 65
End: 2020-12-18

## 2020-12-18 VITALS
HEART RATE: 95 BPM | TEMPERATURE: 99.2 F | WEIGHT: 183.2 LBS | RESPIRATION RATE: 18 BRPM | OXYGEN SATURATION: 93 % | DIASTOLIC BLOOD PRESSURE: 70 MMHG | HEIGHT: 72 IN | SYSTOLIC BLOOD PRESSURE: 118 MMHG | BODY MASS INDEX: 24.81 KG/M2

## 2020-12-18 LAB
ANION GAP SERPL CALCULATED.3IONS-SCNC: 5 MMOL/L (ref 3–14)
BACTERIA SPEC CULT: NORMAL
BUN SERPL-MCNC: 13 MG/DL (ref 7–30)
CALCIUM SERPL-MCNC: 8 MG/DL (ref 8.5–10.1)
CHLORIDE SERPL-SCNC: 111 MMOL/L (ref 94–109)
CO2 SERPL-SCNC: 26 MMOL/L (ref 20–32)
CREAT SERPL-MCNC: 0.58 MG/DL (ref 0.66–1.25)
ERYTHROCYTE [DISTWIDTH] IN BLOOD BY AUTOMATED COUNT: 18.6 % (ref 10–15)
GFR SERPL CREATININE-BSD FRML MDRD: >90 ML/MIN/{1.73_M2}
GLUCOSE SERPL-MCNC: 127 MG/DL (ref 70–99)
GRAM STN SPEC: NORMAL
HCT VFR BLD AUTO: 25.8 % (ref 40–53)
HGB BLD-MCNC: 7.7 G/DL (ref 13.3–17.7)
MCH RBC QN AUTO: 30.7 PG (ref 26.5–33)
MCHC RBC AUTO-ENTMCNC: 29.8 G/DL (ref 31.5–36.5)
MCV RBC AUTO: 103 FL (ref 78–100)
PLATELET # BLD AUTO: 333 10E9/L (ref 150–450)
POTASSIUM SERPL-SCNC: 3.9 MMOL/L (ref 3.4–5.3)
RBC # BLD AUTO: 2.51 10E12/L (ref 4.4–5.9)
SODIUM SERPL-SCNC: 142 MMOL/L (ref 133–144)
SPECIMEN SOURCE: NORMAL
SPECIMEN SOURCE: NORMAL
WBC # BLD AUTO: 21.8 10E9/L (ref 4–11)

## 2020-12-18 PROCEDURE — 85027 COMPLETE CBC AUTOMATED: CPT | Performed by: INTERNAL MEDICINE

## 2020-12-18 PROCEDURE — 80048 BASIC METABOLIC PNL TOTAL CA: CPT | Performed by: INTERNAL MEDICINE

## 2020-12-18 PROCEDURE — 99239 HOSP IP/OBS DSCHRG MGMT >30: CPT | Performed by: NURSE PRACTITIONER

## 2020-12-18 PROCEDURE — 36415 COLL VENOUS BLD VENIPUNCTURE: CPT | Performed by: INTERNAL MEDICINE

## 2020-12-18 PROCEDURE — 250N000013 HC RX MED GY IP 250 OP 250 PS 637: Performed by: INTERNAL MEDICINE

## 2020-12-18 PROCEDURE — 250N000013 HC RX MED GY IP 250 OP 250 PS 637: Performed by: NURSE PRACTITIONER

## 2020-12-18 PROCEDURE — 250N000011 HC RX IP 250 OP 636: Performed by: INTERNAL MEDICINE

## 2020-12-18 RX ORDER — HYDROCORTISONE ACETATE 25 MG/1
25 SUPPOSITORY RECTAL 2 TIMES DAILY
Status: DISCONTINUED | OUTPATIENT
Start: 2020-12-18 | End: 2020-12-18 | Stop reason: HOSPADM

## 2020-12-18 RX ADMIN — CITALOPRAM HYDROBROMIDE 60 MG: 20 TABLET ORAL at 09:36

## 2020-12-18 RX ADMIN — CEFEPIME HYDROCHLORIDE 2 G: 2 INJECTION, SOLUTION INTRAVENOUS at 01:10

## 2020-12-18 RX ADMIN — LEVETIRACETAM 500 MG: 500 TABLET, FILM COATED ORAL at 09:36

## 2020-12-18 RX ADMIN — HYDROCORTISONE ACETATE 25 MG: 25 SUPPOSITORY RECTAL at 09:36

## 2020-12-18 RX ADMIN — CEFEPIME HYDROCHLORIDE 2 G: 2 INJECTION, SOLUTION INTRAVENOUS at 09:36

## 2020-12-18 RX ADMIN — OMEPRAZOLE 20 MG: 20 CAPSULE, DELAYED RELEASE ORAL at 09:36

## 2020-12-18 RX ADMIN — PIOGLITAZONE 15 MG: 15 TABLET ORAL at 09:36

## 2020-12-18 RX ADMIN — BACITRACIN: 500 OINTMENT TOPICAL at 09:37

## 2020-12-18 ASSESSMENT — ACTIVITIES OF DAILY LIVING (ADL)
ADLS_ACUITY_SCORE: 15

## 2020-12-18 NOTE — PLAN OF CARE
/70 (BP Location: Right arm)   Pulse 95   Temp 99.2  F (37.3  C) (Tympanic)   Resp 18   Ht 1.829 m (6')   Wt 83.1 kg (183 lb 3.2 oz)   SpO2 93%   BMI 24.85 kg/m      Pt alert and oriented upon initial assessment. Denies pain. EW noted in lungs. RA 95%. BS active. SR 60s. Thoracentesis site WDL. Scab to R flank WDL, medication applied. SBA. Pt left via EMS approx 1015. All questions answered. Lashell, daughter, updated of pt departure. [facility] nurse to nurse given to ARIAS Nor[place]  At 1020. All questions answered.

## 2020-12-18 NOTE — DISCHARGE SUMMARY
[facility]    Discharge/Transfer Summary  Hospitalist    Date of Admission:  12/13/2020  Date of Discharge:  12/18/2020  Discharging Provider: Astrid Banuelos CNP  Date of Service (when I saw the patient): 12/18/20    Discharge Diagnoses   Principal Problem:    Community acquired bacterial pneumonia  Active Problems:    Seizure disorder (H)    Hemolytic anemia associated with lymphoproliferative disorder (H)    CLL (chronic lymphocytic leukemia) (H)    At high risk for infection due to chemotherapy    Bipolar disorder, unspecified (H)    Diabetes mellitus, type II (H)      History of Present Illness   From admission: Cuco Cooper is a 65 year old male with history of CLL currently on ibrutinib via Dr. Sarah with [facility], seizure disorder on Keppra, non-insulin-dependent diabetes mellitus, bipolar disorder who presents with left-sided pleuritic chest pain, occasional coughing, weakness and fatigue that has been progressive for about a month.  He also complains of rectal pain associated with hemorrhoid.  He does complain of chills, and he had a measured fever of 101.5 in the emergency department.  His chest pain is worsened with coughing, however his cough has been nonproductive.  He denies any sick contacts.  He lives with daughter and son-in-law, both of which are healthy.  Chest x-ray in emergency department shows left-sided infiltrate with associated small effusion.  No evidence of hypoxia however.  COVID-19 swab is negative, as well as influenza a, B, and RSV.  Patient is also anemic at 6.6, last hemoglobin available in care everywhere from October is greater than 8.  No history of blood loss.  With history of CLL that is active, patient will be admitted for likely pneumonia in the setting of immunocompromise.    Hospital Course   Community acquired bacterial pneumonia: Left lower opacity plus likely effusion. Symptoms x3-4 weeks prior to presentation. He is still having productive cough. He  has not required any oxygen. He has chronic leukocytosis due to CLL, has had a fever, last was 101 yesterday evening. Will continue zithromax and cefepime. Recheck CXR in AM.  -12/18: Mild fever continues, cough is nearly resolved, no dyspnea at rest but continues to have some exertional dyspnea. Cough is producutive when it occurs.         Pleural effusion: Large left effusion that hs grown since original CXR on arrival. CT scan shows it is loculated. However with fever will attempt thoracentesis for at least diagnostic if not both diagnostic and therapeutic. Concern for parapneumonic empyema. He does have dyspnea on exertion but has not had any hypoxia. He has some left flank pain x 1 month.   12/17: Thoracentesis completed with 750cc brown tinged fluid, no change in symptoms after procedure. Post CXR shows mild improved expansion to the left lung but still large amount of fluid remaining. Sent to pathology for cytology (known CLL), fluid analysis confirms exudative.   -12/18: Discussed with cardiothoracic surgery Dr. France, she was able to review images and agrees he will need decortication. He still has cytology, culture and gram stain pending.        Seizure disorder (H): Continue Keppra, denies recent seizure activity.        Wound: Right flank 2-3 black eschar noted with wider areas of fluctuance about 3x4 inches consistent with abscess. Eschar had begun to shrink away revealed fibrinous tissue below. Eschar was removed, with some deep palpation did get some thin pustulant material to come out. The area is not particularly painful, patient completely unaware it was there. Culture was taken.      Left upper back pain: Notes x1 month. Not pleuritic or spasmotic  in nature. Tender to touch. He denies any known injury but again is a very poor historian.  Repeat CXR done this morning shows worsening left effusion, consider CT scan but he is not more short of breath, is not hypoxic.        Hemolytic anemia  associated with lymphoproliferative disorder (H): Hgb 6.6 on arrival so received 2 units PRBC's. Hemoglobin has been stable since, no indications of acute blood loss.        Bipolar disorder, unspecified (H): Continue home medications as prescribed.       Diabetes mellitus, type II (H): Controlled on oral Actos, continue same. Will get QID sugars while here, he does not check sugars at home.        CLL (chronic lymphocytic leukemia) (H)      At high risk for infection due to chemotherapy      Astrid Banuelos CNP        Pending Results     Unresulted Labs Ordered in the Past 30 Days of this Admission     Date and Time Order Name Status Description    12/17/2020 1002 Gram stain In process     12/17/2020 1002 Anaerobic bacterial culture In process     12/17/2020 1002 Fluid Culture Aerobic Bacterial In process     12/17/2020 1002 Cytology In process     12/13/2020 1224 Blood culture Preliminary     12/13/2020 1224 Blood culture Preliminary           Code Status   DNR       Primary Care Physician   Alexi Unger    Discharge Disposition   Transferred to [facility]  Condition at discharge: Stable    Consultations This Hospital Stay   ADVANCE DIRECTIVE IP CONSULT  PHYSICAL THERAPY ADULT IP CONSULT  OCCUPATIONAL THERAPY ADULT IP CONSULT    Time Spent on this Encounter   I, Astrid Banuelos NP, personally saw the patient today and spent greater than 30 minutes discharging this patient.    Discharge Orders   No discharge procedures on file.  Discharge Medications   Current Discharge Medication List      CONTINUE these medications which have NOT CHANGED    Details   aspirin (ASA) 81 MG EC tablet Take 81 mg by mouth daily      citalopram (CELEXA) 40 MG tablet Take 40 mg by mouth daily       divalproex sodium extended-release (DEPAKOTE ER) 500 MG 24 hr tablet Take 1,000 mg by mouth 2 times daily       ibrutinib (IMBRUVICA) 140 MG tablet Take 420 mg by mouth daily       levETIRAcetam (KEPPRA) 500 MG tablet Take  500 mg by mouth every 12 hours       pioglitazone (ACTOS) 15 MG tablet Take 15 mg by mouth daily       QUEtiapine (SEROQUEL) 200 MG tablet Take 200 mg by mouth daily       simvastatin (ZOCOR) 20 MG tablet Take 20 mg by mouth every evening       albuterol (PROVENTIL) (2.5 MG/3ML) 0.083% neb solution Take 2.5 mg by nebulization every 4 hours as needed for shortness of breath / dyspnea or wheezing      prochlorperazine (COMPAZINE) 10 MG tablet Take 10 mg by mouth every 6 hours as needed         STOP taking these medications       OMEPRAZOLE PO Comments:   Reason for Stopping:             Allergies   Allergies   Allergen Reactions     Blood Transfusion Related (Informational Only) Other (See Comments)     Patient has a history of a warm autoantibody.  A delay in compatible RBCs may occur.     Sulfa Drugs      Data   Most Recent 3 CBC's:  Recent Labs   Lab Test 12/18/20  0527 12/17/20  1015 12/17/20  0502 12/16/20  0517   WBC 21.8*  --  18.9* 20.8*   HGB 7.7*  --  7.4* 7.9*   *  --  102* 101*    300 293 306      Most Recent 3 BMP's:  Recent Labs   Lab Test 12/18/20  0527 12/17/20  0502 12/16/20  0517    142 141   POTASSIUM 3.9 3.6 3.6   CHLORIDE 111* 111* 109   CO2 26 26 27   BUN 13 13 16   CR 0.58* 0.61* 0.69   ANIONGAP 5 5 5   IVON 8.0* 7.7* 7.8*   * 133* 121*     Most Recent 2 LFT's:  Recent Labs   Lab Test 12/13/20  1205 06/11/17  1340   AST 40 16   ALT 21 21   ALKPHOS 235* 90   BILITOTAL 0.5 0.4     Most Recent INR's and Anticoagulation Dosing History:  Anticoagulation Dose History     Recent Dosing and Labs Latest Ref Rng & Units 11/13/2014 8/16/2015 9/7/2015 10/29/2015 12/17/2020    INR 0.86 - 1.14 1.04 1.19 1.20 1.00 1.27(H)        Most Recent 3 Troponin's:  Recent Labs   Lab Test 10/29/15  2030 09/07/15  2315 08/31/15  1443   TROPI 0.028 <0.015  The 99th percentile for upper reference range is 0.045 ug/L.  Troponin values in   the range of 0.045 - 0.120 ug/L may be associated with risks  of adverse   clinical events.   <0.015  The 99th percentile for upper reference range is 0.045 ug/L.  Troponin values in   the range of 0.045 - 0.120 ug/L may be associated with risks of adverse   clinical events.       Most Recent Cholesterol Panel:No lab results found.  Most Recent 6 Bacteria Isolates From Any Culture (See EPIC Reports for Culture Details):  Recent Labs   Lab Test 12/16/20  0745 12/13/20  1250 12/13/20  1245 10/29/15  2150 10/29/15  2125 08/17/15  0045   CULT Light growth  Normal skin kamari  No further identification or sensitivity done   No growth after 4 days No growth after 4 days No growth after 6 days No growth after 6 days No growth after 6 days     Most Recent TSH, T4 and A1c Labs:  Recent Labs   Lab Test 09/07/15  2315 06/04/14  0545   TSH 1.61  --    A1C  --  6.1     Results for orders placed or performed during the hospital encounter of 12/13/20   XR Chest Port 1 View    Narrative    PROCEDURE:  XR CHEST PORT 1 VW    HISTORY: chest pain and fever. .    COMPARISON:  10/31/2015    FINDINGS:    The cardiomediastinal contours are partially obscured. There is  calcific aortic atherosclerosis.   There is new consolidative opacity at the left lung base associated  with some pleural fluid. Mild patchy right medial base opacity is also  questioned. No pneumothorax.      Impression    IMPRESSION:  Left pleural effusion. Left greater than right base  pulmonary opacities. Left lower lobe/multifocal pneumonia with a left  parapneumonic effusion is in the differential. Recommend follow-up.      CELESTE TURK MD   XR Chest 2 Views    Narrative    PROCEDURE: XR CHEST 2 VW 12/16/2020 8:06 AM    HISTORY: recheck left pleural effusion /pneumonia    COMPARISONS: 12/13/2020.    TECHNIQUE: 2 views.    FINDINGS: The amount of fluid in the left pleural space has increased.  There is no fairly large left-sided effusion. There is some adjacent  atelectasis or infiltrate in the left lower lobe.    Heart is  stable in size. Mild interstitial prominence is stable. No  right-sided effusion is seen.         Impression    IMPRESSION: Increase in size of left pleural effusion.    DINA GANDHI MD   CT Chest Abodmen w/o Contrast    Narrative    CT CHEST ABDOMEN W/O CONTRAST    CLINICAL HISTORY: Male, age 65 years, Acute resp illness, > 40 years  old; Pleural effusion;    Comparison:  Chest x-ray 12/16/2020    TECHNIQUE:  CT was performed of the chest, abdomen without contrast.   Sagittal, coronal, axial and 3-D MIP reconstructions were reviewed.     FINDINGS:  Chest CT:   There is a large loculated effusion seen throughout the left  hemithorax. No evidence of right-sided pleural effusion.    Peripheral areas of scarring, atelectasis and interstitial thickening  are appreciated. Early fibrotic changes are seen in the dependent  portions of the right lower lobe.    Thyroid gland is normal. Esophagus is patulous without acute  abnormality.    Lymph nodes: There are number of normal-sized nodes throughout the  mediastinum and axillary regions without evidence of pathologic lymph  node enlargement.    Moderate size pericardial effusion is also present. Dense  calcifications are seen within the aortic valve and coronary arteries.    Abdomen/Pelvis CT:  Stomach and duodenum are normal.    The liver is normal. Gallbladder demonstrates radiodense gallstone  with questionable wall thickening. Spleen is normal. Pancreas  demonstrates moderate atrophy.    Adrenal glands: Normal.    Kidneys: Grossly normal.     Visualized portions of the ureters are unremarkable. Visualized  portions of the large and small bowel are unremarkable.    There are numerous normal size and mildly enlarged nodes throughout  the retroperitoneum.      Impression    IMPRESSION:   Large loculated left-sided pleural effusion.    Moderate-sized pericardial effusion.    Numerous normal-sized nodes throughout the mediastinum, hilar regions  as well as the  retroperitoneum of the upper abdomen may be reactive in  nature. Patient has a known history of leukemia which also could be  related to these lymph nodes.    An 18 mm peripherally calcified nodule in the AP window may represent  a peripherally calcified lymph node. Although less than likely,  differential diagnosis also includes the possibility of a focal  aneurysm arising from the thoracic aorta.    JAMIE GOETZ MD   US Thoracentesis    Narrative    Exam:US THORACENTESIS, XR CHEST 1 VW.    History:65 years Male large left pleural effusion    Comparison:  Chest x-ray 12/16/2020    Technique: After informed consent was obtained, a timeout was  performed, satisfactorily identifying the patient and the site of the  procedure.    Ultrasound evaluation was performed, demonstrating moderate volume of  loculated pleural fluid.    The patient was then placed in the upright position and prepped and  draped in usual sterile fashion. 4 mL of 1% lidocaine was then  instilled.    A 5 Armenian paracentesis catheter was then positioned. Tubing was  placed to vacuum suction.    A total of 750 milliliters and was removed. The patient tolerated the  procedure well.           Impression    Impression:  Technically successful ultrasound guided left thoracentesis.    750 milliliters of fluid was removed. 30 mL was sent for analysis.    No acute complication. Postprocedure x-ray: No evidence of  pneumothorax/hydropneumothorax. The left-sided pleural effusion has  decreased slightly in volume with increased aeration of the left lower  lobe. Moderate volume of pleural fluid however persists, likely  related to the complexity of the effusion itself.    JAMIE GOETZ MD   XR Chest 1 View    Narrative    Exam:US THORACENTESIS, XR CHEST 1 VW.    History:65 years Male large left pleural effusion    Comparison:  Chest x-ray 12/16/2020    Technique: After informed consent was obtained, a timeout was  performed, satisfactorily identifying  the patient and the site of the  procedure.    Ultrasound evaluation was performed, demonstrating moderate volume of  loculated pleural fluid.    The patient was then placed in the upright position and prepped and  draped in usual sterile fashion. 4 mL of 1% lidocaine was then  instilled.    A 5 Divehi paracentesis catheter was then positioned. Tubing was  placed to vacuum suction.    A total of 750 milliliters and was removed. The patient tolerated the  procedure well.           Impression    Impression:  Technically successful ultrasound guided left thoracentesis.    750 milliliters of fluid was removed. 30 mL was sent for analysis.    No acute complication. Postprocedure x-ray: No evidence of  pneumothorax/hydropneumothorax. The left-sided pleural effusion has  decreased slightly in volume with increased aeration of the left lower  lobe. Moderate volume of pleural fluid however persists, likely  related to the complexity of the effusion itself.    JAMIE GOETZ MD

## 2020-12-18 NOTE — PLAN OF CARE
Physical Therapy Discharge Summary    Reason for therapy discharge:    Discharged to Transfer to another facility    Progress towards therapy goal(s). See goals on Care Plan in [facility] electronic health record for goal details.  Goals not met.  Barriers to achieving goals:   discharge from facility.    Therapy recommendation(s):    NA

## 2020-12-18 NOTE — PLAN OF CARE
Took over care at 0330. VSS. Temp 99.4. Denies pain. Noted cough and nasal drainage.    Face to face report given with opportunity to observe patient.    Report given to ARIAS Zhang RN   12/18/2020  7:03 AM

## 2020-12-18 NOTE — PROGRESS NOTES
At ( am went to check on patient . Patient asleep. His Nurse said he was doing well. No s/s of bleeding, sob, infection, or c/o pain .

## 2020-12-18 NOTE — PLAN OF CARE
Attempted PT this morning however pt would not participate due to needing to wait for suppository. Will attempt again when able

## 2020-12-18 NOTE — PLAN OF CARE
Accessed chart regarding phone call from  [facility].  Miguel BIANCHI was looking for COVID results.  Result printed by supervisor and faxed to [phone]

## 2020-12-18 NOTE — PLAN OF CARE
Face to face report given with opportunity to observe patient.    Report given to Adriana Lim RN   12/17/2020  11:22 PM

## 2020-12-18 NOTE — PLAN OF CARE
Occupational Therapy Discharge Summary    Reason for therapy discharge:    Patient transferring to another facility.     Progress towards therapy goal(s). See goals on Care Plan in [facility] electronic health record for goal details.  Goals partially met.  Barriers to achieving goals:   discharge from facility.    Therapy recommendation(s):    TBD by accepting facility.

## 2020-12-19 LAB
BACTERIA SPEC CULT: NORMAL
BACTERIA SPEC CULT: NORMAL
Lab: NORMAL
SPECIMEN SOURCE: NORMAL
SPECIMEN SOURCE: NORMAL

## 2020-12-23 LAB — COPATH REPORT: NORMAL

## 2020-12-24 LAB
BACTERIA SPEC CULT: NO GROWTH
BACTERIA SPEC CULT: NORMAL
SPECIMEN SOURCE: NORMAL
SPECIMEN SOURCE: NORMAL

## 2021-01-01 ENCOUNTER — TRANSFERRED RECORDS (OUTPATIENT)
Dept: HEALTH INFORMATION MANAGEMENT | Facility: HOSPITAL | Age: 66
End: 2021-01-01
Payer: COMMERCIAL

## 2021-01-01 ENCOUNTER — APPOINTMENT (OUTPATIENT)
Dept: GENERAL RADIOLOGY | Facility: HOSPITAL | Age: 66
End: 2021-01-01
Attending: NURSE PRACTITIONER
Payer: MEDICARE

## 2021-01-01 ENCOUNTER — HOSPITAL ENCOUNTER (EMERGENCY)
Facility: HOSPITAL | Age: 66
Discharge: HOME | End: 2021-09-23
Attending: NURSE PRACTITIONER | Admitting: NURSE PRACTITIONER
Payer: MEDICARE

## 2021-01-01 ENCOUNTER — HEALTH MAINTENANCE LETTER (OUTPATIENT)
Age: 66
End: 2021-01-01

## 2021-01-01 VITALS
RESPIRATION RATE: 16 BRPM | DIASTOLIC BLOOD PRESSURE: 87 MMHG | SYSTOLIC BLOOD PRESSURE: 132 MMHG | HEART RATE: 97 BPM | TEMPERATURE: 98.6 F | OXYGEN SATURATION: 94 %

## 2021-01-01 DIAGNOSIS — M54.50 LOWER BACK PAIN: Primary | ICD-10-CM

## 2021-01-01 DIAGNOSIS — M54.50 LOW BACK PAIN, UNSPECIFIED BACK PAIN LATERALITY, UNSPECIFIED CHRONICITY, UNSPECIFIED WHETHER SCIATICA PRESENT: ICD-10-CM

## 2021-01-01 LAB
ABO + RH BLD: NORMAL
ABO + RH BLD: NORMAL
BLD GP AB SCN SERPL QL: NORMAL
BLD PROD TYP BPU: NORMAL
BLOOD BANK CMNT PATIENT-IMP: NORMAL
NUM BPU REQUESTED: 2
RETINOPATHY: NEGATIVE
SPECIMEN EXP DATE BLD: NORMAL

## 2021-01-01 PROCEDURE — 96372 THER/PROPH/DIAG INJ SC/IM: CPT | Performed by: NURSE PRACTITIONER

## 2021-01-01 PROCEDURE — 73562 X-RAY EXAM OF KNEE 3: CPT | Mod: LT

## 2021-01-01 PROCEDURE — 99213 OFFICE O/P EST LOW 20 MIN: CPT | Performed by: NURSE PRACTITIONER

## 2021-01-01 PROCEDURE — 73502 X-RAY EXAM HIP UNI 2-3 VIEWS: CPT

## 2021-01-01 PROCEDURE — G0463 HOSPITAL OUTPT CLINIC VISIT: HCPCS | Mod: 25

## 2021-01-01 PROCEDURE — 250N000011 HC RX IP 250 OP 636: Performed by: NURSE PRACTITIONER

## 2021-01-01 PROCEDURE — 72100 X-RAY EXAM L-S SPINE 2/3 VWS: CPT

## 2021-01-01 RX ORDER — KETOROLAC TROMETHAMINE 15 MG/ML
15 INJECTION, SOLUTION INTRAMUSCULAR; INTRAVENOUS ONCE
Status: COMPLETED | OUTPATIENT
Start: 2021-01-01 | End: 2021-01-01

## 2021-01-01 RX ORDER — TIZANIDINE 2 MG/1
2 TABLET ORAL 3 TIMES DAILY PRN
Qty: 15 TABLET | Refills: 0 | Status: ON HOLD | OUTPATIENT
Start: 2021-01-01 | End: 2022-01-01

## 2021-01-01 RX ADMIN — KETOROLAC TROMETHAMINE 15 MG: 15 INJECTION, SOLUTION INTRAMUSCULAR; INTRAVENOUS at 09:34

## 2021-01-01 ASSESSMENT — ENCOUNTER SYMPTOMS
BACK PAIN: 0
NECK PAIN: 0
WOUND: 1
WEAKNESS: 0
HEADACHES: 0
PSYCHIATRIC NEGATIVE: 1
NUMBNESS: 0
NECK STIFFNESS: 0
ARTHRALGIAS: 1
FEVER: 0
MYALGIAS: 1
NAUSEA: 0
DIARRHEA: 0
VOMITING: 0
DIZZINESS: 0
CHILLS: 0
SHORTNESS OF BREATH: 0

## 2021-01-15 ENCOUNTER — HEALTH MAINTENANCE LETTER (OUTPATIENT)
Age: 66
End: 2021-01-15

## 2021-04-18 ENCOUNTER — HEALTH MAINTENANCE LETTER (OUTPATIENT)
Age: 66
End: 2021-04-18

## 2021-08-07 ENCOUNTER — HEALTH MAINTENANCE LETTER (OUTPATIENT)
Age: 66
End: 2021-08-07

## 2021-09-23 NOTE — ED TRIAGE NOTES
Pt presents with c/o left elbow, left knee pain. Reports he fell down the steps yesterday. CMS intact. Full ROM with some pain. Denies hitting head, loc, no neck pain. States his lower back is hurting also. Pt has not taken any otc meds for pain.

## 2021-09-23 NOTE — DISCHARGE INSTRUCTIONS
- Continue with conservative measures including Rest, Ice, Tylenol and/or Ibuprofen per package instructions for discomfort  (You can alternate Tylenol (acetaminophen) with Advil (ibuprofen).  Example: Ibuprofen 8 AM, Tylenol 12 PM, ibuprofen 4 PM, Tylenol 8 PM, ibuprofen 10 PM, etc.).  Take ibuprofen with food.  -Tizanidine as needed for muscle spasms  - After initial injury you can try heat for comfort if this feels better than ice  - Topical anesthetic such as Biofreeze, Bengay, Icy Hot, Lidocaine, others.   - Avoid overusing or exerting which could delay healing and cause further injury  - If no improvement or worsening symptoms return for further evaluation    Follow up with primary care provider or return to urgent care/ED with any worsening in condition or additional concerns.

## 2021-09-23 NOTE — ED PROVIDER NOTES
History     Chief Complaint   Patient presents with     Fall     HPI  Cuco Cooper is a 66 year old male who presents to urgent care today (ambulatory) with complaints of pain after a fall which occurred yesterday afternoon.  Fell down two stairs indoor, unsure if carpeted. Pain primarily to left hip and left knee.  Denies previous fracture, dislocation or surgeries to left hip and left knee.  Pain currently 10/10 and has not taken anything for discomfort since fall.  Denies hitting head, LOC or other injuries.  Patient drove self today.  Wants imaging completed to make sure he did not break anything.  No other concerns.     Allergies:  Allergies   Allergen Reactions     Blood Transfusion Related (Informational Only) Other (See Comments)     Patient has a history of a warm autoantibody.  A delay in compatible RBCs may occur.     Sulfa Drugs        Problem List:    Patient Active Problem List    Diagnosis Date Noted     Seizure disorder (H) 10/29/2015     Priority: High     Class: Acute     Pneumonia, community acquired 10/29/2015     Priority: High     Class: Acute     Anemia 12/13/2020     Priority: Medium     CLL (chronic lymphocytic leukemia) (H) 12/13/2020     Priority: Medium     Community acquired bacterial pneumonia 12/13/2020     Priority: Medium     At high risk for infection due to chemotherapy 12/13/2020     Priority: Medium     Diabetes mellitus, type II (H) 10/29/2015     Priority: Medium     Elevated brain natriuretic peptide (BNP) level 10/29/2015     Priority: Medium     Hemolytic anemia associated with lymphoproliferative disorder (H) 10/19/2015     Priority: Medium     Anxiety 11/01/2010     Priority: Medium     Cognitive dysfunction 11/01/2010     Priority: Medium     Overview:   IMO Update 10/11       Bipolar disorder, unspecified (H) 10/28/2004     Priority: Medium     Updated per 10/1/17 IMO import          Past Medical History:    Past Medical History:   Diagnosis Date     Asthma       Bipolar affective disorder (H)      Cannabis abuse      Chronic lymphatic leukemia (H)      DM (diabetes mellitus) (H)      Dyslipidemia      Hematemesis      HTN (hypertension)      Seizure disorder (H)      Seizures (H)      Status epilepticus (H)      TBI (traumatic brain injury) (H)        Past Surgical History:    Past Surgical History:   Procedure Laterality Date     C REPAIR SHOULDER CAPSULE,ANTERIOR      Left     C TOTAL HIP ARTHROPLASTY      Right     FOREARM SURGERY      Left     TONSILLECTOMY         Family History:    Family History   Problem Relation Age of Onset     Other Cancer Mother        Social History:  Marital Status:   [4]  Social History     Tobacco Use     Smoking status: Current Every Day Smoker     Packs/day: 1.00     Types: Cigarettes     Smokeless tobacco: Never Used   Substance Use Topics     Alcohol use: No     Alcohol/week: 0.0 standard drinks     Drug use: Yes     Types: Marijuana     Comment: marijuana        Medications:    tiZANidine (ZANAFLEX) 2 MG tablet  albuterol (PROVENTIL) (2.5 MG/3ML) 0.083% neb solution  aspirin (ASA) 81 MG EC tablet  citalopram (CELEXA) 40 MG tablet  divalproex sodium extended-release (DEPAKOTE ER) 500 MG 24 hr tablet  ibrutinib (IMBRUVICA) 140 MG tablet  levETIRAcetam (KEPPRA) 500 MG tablet  pioglitazone (ACTOS) 15 MG tablet  prochlorperazine (COMPAZINE) 10 MG tablet  QUEtiapine (SEROQUEL) 200 MG tablet  simvastatin (ZOCOR) 20 MG tablet      Review of Systems   Constitutional: Negative for chills and fever.   Respiratory: Negative for shortness of breath.    Cardiovascular: Negative for chest pain.   Gastrointestinal: Negative for diarrhea, nausea and vomiting.   Musculoskeletal: Positive for arthralgias, gait problem (pain with ambulation) and myalgias. Negative for back pain, neck pain and neck stiffness.   Skin: Positive for wound (abrasion to left forearm from fall).   Neurological: Negative for dizziness, weakness, numbness and headaches.    Psychiatric/Behavioral: Negative.      Physical Exam   BP: 132/87  Pulse: 97  Temp: 98.6  F (37  C)  Resp: 16  SpO2: 94 %    Physical Exam  Vitals and nursing note reviewed.   Constitutional:       General: He is not in acute distress.     Appearance: He is not ill-appearing.   Cardiovascular:      Rate and Rhythm: Normal rate and regular rhythm.      Pulses: Normal pulses.      Heart sounds: Normal heart sounds.   Pulmonary:      Effort: Pulmonary effort is normal.      Breath sounds: Normal breath sounds.   Musculoskeletal:      Cervical back: Normal, normal range of motion and neck supple. No rigidity or tenderness.      Thoracic back: Normal.      Lumbar back: Tenderness present. No swelling, deformity, spasms or bony tenderness. Normal range of motion.      Left hip: Tenderness present. No deformity or crepitus. Normal range of motion. Normal strength.      Left knee: No swelling, deformity or effusion. Normal range of motion. Tenderness present.      Left ankle: Normal.   Skin:     General: Skin is warm and dry.      Capillary Refill: Capillary refill takes less than 2 seconds.      Findings: Abrasion (superficial abrasions to left forearm and left lower extremity from fall, no active bleeding, no signs of infection) present.   Neurological:      Mental Status: He is alert.   Psychiatric:         Mood and Affect: Mood normal.       ED Course     Results for orders placed or performed during the hospital encounter of 09/23/21 (from the past 24 hour(s))   XR Pelvis including Hip Left 2-3 Views    Narrative    PROCEDURE: XR PELVIS AND HIP LEFT 2 VIEWS 9/23/2021 10:17 AM    HISTORY: left hip pain after fall down stairs    COMPARISONS: None.    TECHNIQUE: AP view of the pelvis and AP and lateral views of the left  hip.    FINDINGS: There is a right hip arthroplasty.    There is severe degenerative change in the left hip. No acute fracture  or dislocation is seen. There is some degenerative change in the  lower  lumbar spine as well.         Impression    IMPRESSION: No acute fracture.    DINA GANDHI MD         SYSTEM ID:  [id number]   XR Knee Left 3 Views    Narrative    PROCEDURE: XR KNEE LEFT 3 VIEWS 9/23/2021 10:18 AM    HISTORY: left knee pain after fall    COMPARISONS: None.    TECHNIQUE: 3 views.    FINDINGS: There is no acute fracture or dislocation. There is no joint  effusion.    There is mild degenerative change in the medial compartment. There is  vascular calcification.         Impression    IMPRESSION: No acute bony abnormality.    DINA GANDHI MD         SYSTEM ID:  [id number]   Lumbar spine XR, 2-3 views    Narrative    PROCEDURE: XR LUMBAR SPINE 2-3 VIEWS 9/23/2021 10:18 AM    HISTORY: lower back pain fall down stairs    COMPARISONS: 6/11/2017.    TECHNIQUE: 3 views.    FINDINGS: There is mild anterior wedging of T12 and L1 vertebral  bodies, similar to the prior exam. No acute compression fracture is  seen.    Disc spaces are fairly well preserved. There is mild spur formation.  There is mild degenerative change in lower lumbar facet joints.         Impression    IMPRESSION: Mild chronic appearing compression deformities at the  thoracolumbar junction. No acute compression fracture.    DINA GANDHI MD         SYSTEM ID:  [id number]       Medications   ketorolac (TORADOL) injection 15 mg (15 mg Intramuscular Given 9/23/21 0934)       Assessments & Plan (with Medical Decision Making)     I have reviewed the nursing notes.    I have reviewed the findings, diagnosis, plan and need for follow up with the patient.  Patient arrived to urgent care with complaints of left lower back, hip and left knee pain after fall that occurred yesterday afternoon.  Pain primarily left lower back upon exam.  Patient has not attempted any treatments or medication for pain.  Toradol given in urgent care with some relief.  Patient adamant that he wants imaging completed today, no acute issues found on x-ray.  No  focalized tenderness along spine.  Patient ambulating without difficulty.  No bowel or bladder changes.  No saddle anesthesia.  Patient able to get up from a seated position and ambulate.  Full ROM.  Denies hitting head, loss of consciousness or any other injuries.  Patient drove self to urgent care today.  Patient to alternate tylenol and ibuprofen at home.  Topical anesthetics as needed.  Can alternate ice and heat for pain discomfort.  Short course of tizanidine ordered for muscle spasms.  Patient to follow-up with primary care provider or return to urgent care-ED with any worsening in condition or additional concerns.  Patient in agreement with treatment plan    New Prescriptions    TIZANIDINE (ZANAFLEX) 2 MG TABLET    Take 1 tablet (2 mg) by mouth 3 times daily as needed for muscle spasms     Final diagnoses:   Lower back pain     9/23/2021   [facility]     Bailey Spear, NP  09/23/21 1121

## 2021-09-23 NOTE — ED TRIAGE NOTES
Pt presents with c/o left hip and knee pain from a fall yesterday. Denies hitting head and denies neck pain.

## 2022-01-01 ENCOUNTER — TELEPHONE (OUTPATIENT)
Dept: SURGERY | Facility: OTHER | Age: 67
End: 2022-01-01

## 2022-01-01 ENCOUNTER — APPOINTMENT (OUTPATIENT)
Dept: GENERAL RADIOLOGY | Facility: HOSPITAL | Age: 67
End: 2022-01-01
Attending: INTERNAL MEDICINE
Payer: MEDICARE

## 2022-01-01 ENCOUNTER — APPOINTMENT (OUTPATIENT)
Dept: GENERAL RADIOLOGY | Facility: HOSPITAL | Age: 67
DRG: 871 | End: 2022-01-01
Attending: STUDENT IN AN ORGANIZED HEALTH CARE EDUCATION/TRAINING PROGRAM
Payer: MEDICARE

## 2022-01-01 ENCOUNTER — APPOINTMENT (OUTPATIENT)
Dept: PHYSICAL THERAPY | Facility: HOSPITAL | Age: 67
DRG: 871 | End: 2022-01-01
Attending: INTERNAL MEDICINE
Payer: MEDICARE

## 2022-01-01 ENCOUNTER — APPOINTMENT (OUTPATIENT)
Dept: CT IMAGING | Facility: HOSPITAL | Age: 67
DRG: 871 | End: 2022-01-01
Attending: STUDENT IN AN ORGANIZED HEALTH CARE EDUCATION/TRAINING PROGRAM
Payer: MEDICARE

## 2022-01-01 ENCOUNTER — TRANSFERRED RECORDS (OUTPATIENT)
Dept: EMERGENCY MEDICINE | Facility: HOSPITAL | Age: 67
End: 2022-01-01

## 2022-01-01 ENCOUNTER — TELEPHONE (OUTPATIENT)
Dept: FAMILY MEDICINE | Facility: OTHER | Age: 67
End: 2022-01-01

## 2022-01-01 ENCOUNTER — TRANSFERRED RECORDS (OUTPATIENT)
Dept: HEALTH INFORMATION MANAGEMENT | Facility: CLINIC | Age: 67
End: 2022-01-01

## 2022-01-01 ENCOUNTER — HEALTH MAINTENANCE LETTER (OUTPATIENT)
Age: 67
End: 2022-01-01

## 2022-01-01 ENCOUNTER — APPOINTMENT (OUTPATIENT)
Dept: PHYSICAL THERAPY | Facility: HOSPITAL | Age: 67
DRG: 871 | End: 2022-01-01
Payer: MEDICARE

## 2022-01-01 ENCOUNTER — HOSPITAL ENCOUNTER (OUTPATIENT)
Facility: HOSPITAL | Age: 67
Discharge: HOME | DRG: 871 | End: 2022-07-25
Attending: OPHTHALMOLOGY | Admitting: OPHTHALMOLOGY
Payer: MEDICARE

## 2022-01-01 ENCOUNTER — APPOINTMENT (OUTPATIENT)
Dept: OCCUPATIONAL THERAPY | Facility: HOSPITAL | Age: 67
DRG: 871 | End: 2022-01-01
Attending: INTERNAL MEDICINE
Payer: MEDICARE

## 2022-01-01 ENCOUNTER — HOSPITAL ENCOUNTER (INPATIENT)
Facility: HOSPITAL | Age: 67
LOS: 5 days | Discharge: ANOTHER HOSPITAL | DRG: 871 | End: 2022-07-30
Attending: STUDENT IN AN ORGANIZED HEALTH CARE EDUCATION/TRAINING PROGRAM | Admitting: INTERNAL MEDICINE
Payer: MEDICARE

## 2022-01-01 ENCOUNTER — HOSPITAL ENCOUNTER (EMERGENCY)
Facility: HOSPITAL | Age: 67
Discharge: HOME | End: 2022-07-05
Attending: INTERNAL MEDICINE | Admitting: INTERNAL MEDICINE
Payer: MEDICARE

## 2022-01-01 ENCOUNTER — NURSE TRIAGE (OUTPATIENT)
Dept: NURSING | Facility: CLINIC | Age: 67
End: 2022-01-01

## 2022-01-01 ENCOUNTER — HOSPITAL ENCOUNTER (OUTPATIENT)
Facility: HOSPITAL | Age: 67
End: 2022-01-01
Attending: OPHTHALMOLOGY | Admitting: OPHTHALMOLOGY
Payer: MEDICARE

## 2022-01-01 ENCOUNTER — APPOINTMENT (OUTPATIENT)
Dept: CT IMAGING | Facility: HOSPITAL | Age: 67
DRG: 871 | End: 2022-01-01
Attending: NURSE PRACTITIONER
Payer: MEDICARE

## 2022-01-01 VITALS
RESPIRATION RATE: 18 BRPM | TEMPERATURE: 97.6 F | DIASTOLIC BLOOD PRESSURE: 60 MMHG | HEART RATE: 71 BPM | OXYGEN SATURATION: 94 % | SYSTOLIC BLOOD PRESSURE: 96 MMHG

## 2022-01-01 VITALS
TEMPERATURE: 97.6 F | WEIGHT: 177.25 LBS | HEART RATE: 82 BPM | OXYGEN SATURATION: 94 % | HEIGHT: 72 IN | DIASTOLIC BLOOD PRESSURE: 66 MMHG | RESPIRATION RATE: 13 BRPM | SYSTOLIC BLOOD PRESSURE: 94 MMHG | BODY MASS INDEX: 24.01 KG/M2

## 2022-01-01 DIAGNOSIS — A41.9 SEPSIS, DUE TO UNSPECIFIED ORGANISM, UNSPECIFIED WHETHER ACUTE ORGAN DYSFUNCTION PRESENT (H): ICD-10-CM

## 2022-01-01 DIAGNOSIS — R53.83 OTHER FATIGUE: ICD-10-CM

## 2022-01-01 DIAGNOSIS — J18.9 PNEUMONIA DUE TO INFECTIOUS ORGANISM, UNSPECIFIED LATERALITY, UNSPECIFIED PART OF LUNG: ICD-10-CM

## 2022-01-01 LAB
ABO/RH(D): NORMAL
ALBUMIN SERPL-MCNC: 1 G/DL (ref 3.4–5)
ALBUMIN SERPL-MCNC: 1 G/DL (ref 3.4–5)
ALBUMIN SERPL-MCNC: 1.1 G/DL (ref 3.4–5)
ALBUMIN SERPL-MCNC: 1.2 G/DL (ref 3.4–5)
ALBUMIN SERPL-MCNC: 1.2 G/DL (ref 3.4–5)
ALBUMIN SERPL-MCNC: 1.6 G/DL (ref 3.4–5)
ALBUMIN SERPL-MCNC: 1.6 G/DL (ref 3.4–5)
ALBUMIN UR-MCNC: 10 MG/DL
ALBUMIN UR-MCNC: 30 MG/DL
ALP SERPL-CCNC: 128 U/L (ref 40–150)
ALP SERPL-CCNC: 131 U/L (ref 40–150)
ALP SERPL-CCNC: 134 U/L (ref 40–150)
ALP SERPL-CCNC: 151 U/L (ref 40–150)
ALP SERPL-CCNC: 163 U/L (ref 40–150)
ALP SERPL-CCNC: 183 U/L (ref 40–150)
ALP SERPL-CCNC: 192 U/L (ref 40–150)
ALT SERPL W P-5'-P-CCNC: 12 U/L (ref 0–70)
ALT SERPL W P-5'-P-CCNC: 6 U/L (ref 0–70)
ALT SERPL W P-5'-P-CCNC: 7 U/L (ref 0–70)
ALT SERPL W P-5'-P-CCNC: 9 U/L (ref 0–70)
ALT SERPL W P-5'-P-CCNC: <6 U/L (ref 0–70)
AMIKACIN SERPL-MCNC: 20.2 UG/ML
AMIKACIN SERPL-MCNC: 8.5 UG/ML
AMMONIA PLAS-SCNC: <10 UMOL/L (ref 10–50)
AMPHETAMINES UR QL: NOT DETECTED
ANION GAP SERPL CALCULATED.3IONS-SCNC: 10 MMOL/L (ref 3–14)
ANION GAP SERPL CALCULATED.3IONS-SCNC: 4 MMOL/L (ref 3–14)
ANION GAP SERPL CALCULATED.3IONS-SCNC: 5 MMOL/L (ref 3–14)
ANION GAP SERPL CALCULATED.3IONS-SCNC: 5 MMOL/L (ref 3–14)
ANION GAP SERPL CALCULATED.3IONS-SCNC: 6 MMOL/L (ref 3–14)
ANION GAP SERPL CALCULATED.3IONS-SCNC: 7 MMOL/L (ref 3–14)
ANION GAP SERPL CALCULATED.3IONS-SCNC: 8 MMOL/L (ref 3–14)
ANTIBODY SCREEN: NEGATIVE
APPEARANCE UR: ABNORMAL
APPEARANCE UR: CLEAR
AST SERPL W P-5'-P-CCNC: 14 U/L (ref 0–45)
AST SERPL W P-5'-P-CCNC: 15 U/L (ref 0–45)
AST SERPL W P-5'-P-CCNC: 16 U/L (ref 0–45)
AST SERPL W P-5'-P-CCNC: 17 U/L (ref 0–45)
AST SERPL W P-5'-P-CCNC: 19 U/L (ref 0–45)
AST SERPL W P-5'-P-CCNC: 23 U/L (ref 0–45)
AST SERPL W P-5'-P-CCNC: 31 U/L (ref 0–45)
BACTERIA BLD CULT: ABNORMAL
BACTERIA BLD CULT: NO GROWTH
BACTERIA UR CULT: ABNORMAL
BARBITURATES UR QL SCN: NOT DETECTED
BASE EXCESS BLDV CALC-SCNC: -1.1 MMOL/L (ref -7.7–1.9)
BASOPHILS # BLD MANUAL: 0 10E3/UL (ref 0–0.2)
BASOPHILS # BLD MANUAL: 0 10E3/UL (ref 0–0.2)
BASOPHILS # BLD MANUAL: 0.1 10E3/UL (ref 0–0.2)
BASOPHILS NFR BLD MANUAL: 0 %
BASOPHILS NFR BLD MANUAL: 0 %
BASOPHILS NFR BLD MANUAL: 1 %
BENZODIAZ UR QL SCN: NOT DETECTED
BILIRUB SERPL-MCNC: 0.4 MG/DL (ref 0.2–1.3)
BILIRUB SERPL-MCNC: 0.4 MG/DL (ref 0.2–1.3)
BILIRUB SERPL-MCNC: 0.5 MG/DL (ref 0.2–1.3)
BILIRUB SERPL-MCNC: 0.6 MG/DL (ref 0.2–1.3)
BILIRUB SERPL-MCNC: 0.6 MG/DL (ref 0.2–1.3)
BILIRUB SERPL-MCNC: 0.7 MG/DL (ref 0.2–1.3)
BILIRUB SERPL-MCNC: 1 MG/DL (ref 0.2–1.3)
BILIRUB UR QL STRIP: NEGATIVE
BILIRUB UR QL STRIP: NEGATIVE
BLD PROD TYP BPU: NORMAL
BLD PROD TYP BPU: NORMAL
BLOOD COMPONENT TYPE: NORMAL
BLOOD COMPONENT TYPE: NORMAL
BUN SERPL-MCNC: 18 MG/DL (ref 7–30)
BUN SERPL-MCNC: 22 MG/DL (ref 7–30)
BUN SERPL-MCNC: 23 MG/DL (ref 7–30)
BUN SERPL-MCNC: 29 MG/DL (ref 7–30)
BUN SERPL-MCNC: 35 MG/DL (ref 7–30)
BUN SERPL-MCNC: 38 MG/DL (ref 7–30)
BUN SERPL-MCNC: 39 MG/DL (ref 7–30)
BUPRENORPHINE UR QL: NOT DETECTED
CALCIUM SERPL-MCNC: 7.4 MG/DL (ref 8.5–10.1)
CALCIUM SERPL-MCNC: 7.5 MG/DL (ref 8.5–10.1)
CALCIUM SERPL-MCNC: 7.5 MG/DL (ref 8.5–10.1)
CALCIUM SERPL-MCNC: 7.8 MG/DL (ref 8.5–10.1)
CALCIUM SERPL-MCNC: 7.8 MG/DL (ref 8.5–10.1)
CALCIUM SERPL-MCNC: 7.9 MG/DL (ref 8.5–10.1)
CALCIUM SERPL-MCNC: 8.3 MG/DL (ref 8.5–10.1)
CANNABINOIDS UR QL: DETECTED
CHLORIDE BLD-SCNC: 105 MMOL/L (ref 94–109)
CHLORIDE BLD-SCNC: 106 MMOL/L (ref 94–109)
CHLORIDE BLD-SCNC: 108 MMOL/L (ref 94–109)
CHLORIDE BLD-SCNC: 110 MMOL/L (ref 94–109)
CHLORIDE BLD-SCNC: 110 MMOL/L (ref 94–109)
CHLORIDE BLD-SCNC: 112 MMOL/L (ref 94–109)
CHLORIDE BLD-SCNC: 98 MMOL/L (ref 94–109)
CO2 SERPL-SCNC: 20 MMOL/L (ref 20–32)
CO2 SERPL-SCNC: 20 MMOL/L (ref 20–32)
CO2 SERPL-SCNC: 23 MMOL/L (ref 20–32)
CO2 SERPL-SCNC: 24 MMOL/L (ref 20–32)
CO2 SERPL-SCNC: 24 MMOL/L (ref 20–32)
CO2 SERPL-SCNC: 25 MMOL/L (ref 20–32)
CO2 SERPL-SCNC: 26 MMOL/L (ref 20–32)
COCAINE UR QL SCN: NOT DETECTED
CODING SYSTEM: NORMAL
CODING SYSTEM: NORMAL
COLOR UR AUTO: YELLOW
COLOR UR AUTO: YELLOW
CREAT SERPL-MCNC: 0.8 MG/DL (ref 0.66–1.25)
CREAT SERPL-MCNC: 0.84 MG/DL (ref 0.66–1.25)
CREAT SERPL-MCNC: 0.88 MG/DL (ref 0.66–1.25)
CREAT SERPL-MCNC: 0.93 MG/DL (ref 0.66–1.25)
CREAT SERPL-MCNC: 1.01 MG/DL (ref 0.66–1.25)
CREAT SERPL-MCNC: 1.08 MG/DL (ref 0.66–1.25)
CREAT SERPL-MCNC: 1.35 MG/DL (ref 0.66–1.25)
CREATININE (EXTERNAL): 1.3 MG/DL (ref 0.8–1.5)
CROSSMATCH: NORMAL
CROSSMATCH: NORMAL
CRP SERPL-MCNC: 133 MG/L (ref 0–8)
CRP SERPL-MCNC: 141 MG/L (ref 0–8)
CRP SERPL-MCNC: 180 MG/L (ref 0–8)
CRP SERPL-MCNC: 84.8 MG/L (ref 0–8)
CRP SERPL-MCNC: 85.2 MG/L (ref 0–8)
CRP SERPL-MCNC: 90.2 MG/L (ref 0–8)
CRP SERPL-MCNC: 92 MG/L (ref 0–8)
D-METHAMPHET UR QL: NOT DETECTED
EOSINOPHIL # BLD MANUAL: 0 10E3/UL (ref 0–0.7)
EOSINOPHIL NFR BLD MANUAL: 0 %
ERYTHROCYTE [DISTWIDTH] IN BLOOD BY AUTOMATED COUNT: 14.7 % (ref 10–15)
ERYTHROCYTE [DISTWIDTH] IN BLOOD BY AUTOMATED COUNT: 16 % (ref 10–15)
ERYTHROCYTE [DISTWIDTH] IN BLOOD BY AUTOMATED COUNT: 16.2 % (ref 10–15)
ERYTHROCYTE [DISTWIDTH] IN BLOOD BY AUTOMATED COUNT: 16.2 % (ref 10–15)
ERYTHROCYTE [DISTWIDTH] IN BLOOD BY AUTOMATED COUNT: 17.3 % (ref 10–15)
ERYTHROCYTE [DISTWIDTH] IN BLOOD BY AUTOMATED COUNT: 17.4 % (ref 10–15)
ERYTHROCYTE [DISTWIDTH] IN BLOOD BY AUTOMATED COUNT: 17.4 % (ref 10–15)
ERYTHROCYTE [DISTWIDTH] IN BLOOD BY AUTOMATED COUNT: 18.4 % (ref 10–15)
ERYTHROCYTE [DISTWIDTH] IN BLOOD BY AUTOMATED COUNT: 18.4 % (ref 10–15)
ETHANOL SERPL-MCNC: <0.01 G/DL
ETHANOL SERPL-MCNC: <0.01 G/DL
FLUAV RNA SPEC QL NAA+PROBE: NEGATIVE
FLUAV RNA SPEC QL NAA+PROBE: NEGATIVE
FLUBV RNA RESP QL NAA+PROBE: NEGATIVE
FLUBV RNA RESP QL NAA+PROBE: NEGATIVE
GFR ESTIMATED (EXTERNAL): 55 ML/MIN/1.73M2
GFR SERPL CREATININE-BSD FRML MDRD: 57 ML/MIN/1.73M2
GFR SERPL CREATININE-BSD FRML MDRD: 75 ML/MIN/1.73M2
GFR SERPL CREATININE-BSD FRML MDRD: 81 ML/MIN/1.73M2
GFR SERPL CREATININE-BSD FRML MDRD: 89 ML/MIN/1.73M2
GFR SERPL CREATININE-BSD FRML MDRD: >90 ML/MIN/1.73M2
GLUCOSE (EXTERNAL): 157 MG/DL (ref 60–99)
GLUCOSE BLD-MCNC: 110 MG/DL (ref 70–99)
GLUCOSE BLD-MCNC: 117 MG/DL (ref 70–99)
GLUCOSE BLD-MCNC: 119 MG/DL (ref 70–99)
GLUCOSE BLD-MCNC: 142 MG/DL (ref 70–99)
GLUCOSE BLD-MCNC: 147 MG/DL (ref 70–99)
GLUCOSE BLD-MCNC: 169 MG/DL (ref 70–99)
GLUCOSE BLD-MCNC: 199 MG/DL (ref 70–99)
GLUCOSE BLDC GLUCOMTR-MCNC: 103 MG/DL (ref 70–99)
GLUCOSE BLDC GLUCOMTR-MCNC: 106 MG/DL (ref 70–99)
GLUCOSE BLDC GLUCOMTR-MCNC: 106 MG/DL (ref 70–99)
GLUCOSE BLDC GLUCOMTR-MCNC: 107 MG/DL (ref 70–99)
GLUCOSE BLDC GLUCOMTR-MCNC: 109 MG/DL (ref 70–99)
GLUCOSE BLDC GLUCOMTR-MCNC: 110 MG/DL (ref 70–99)
GLUCOSE BLDC GLUCOMTR-MCNC: 115 MG/DL (ref 70–99)
GLUCOSE BLDC GLUCOMTR-MCNC: 117 MG/DL (ref 70–99)
GLUCOSE BLDC GLUCOMTR-MCNC: 118 MG/DL (ref 70–99)
GLUCOSE BLDC GLUCOMTR-MCNC: 119 MG/DL (ref 70–99)
GLUCOSE BLDC GLUCOMTR-MCNC: 120 MG/DL (ref 70–99)
GLUCOSE BLDC GLUCOMTR-MCNC: 122 MG/DL (ref 70–99)
GLUCOSE BLDC GLUCOMTR-MCNC: 124 MG/DL (ref 70–99)
GLUCOSE BLDC GLUCOMTR-MCNC: 127 MG/DL (ref 70–99)
GLUCOSE BLDC GLUCOMTR-MCNC: 137 MG/DL (ref 70–99)
GLUCOSE BLDC GLUCOMTR-MCNC: 138 MG/DL (ref 70–99)
GLUCOSE BLDC GLUCOMTR-MCNC: 141 MG/DL (ref 70–99)
GLUCOSE BLDC GLUCOMTR-MCNC: 143 MG/DL (ref 70–99)
GLUCOSE BLDC GLUCOMTR-MCNC: 145 MG/DL (ref 70–99)
GLUCOSE BLDC GLUCOMTR-MCNC: 175 MG/DL (ref 70–99)
GLUCOSE BLDC GLUCOMTR-MCNC: 73 MG/DL (ref 70–99)
GLUCOSE BLDC GLUCOMTR-MCNC: 93 MG/DL (ref 70–99)
GLUCOSE UR STRIP-MCNC: NEGATIVE MG/DL
GLUCOSE UR STRIP-MCNC: NEGATIVE MG/DL
HAPTOGLOB SERPL-MCNC: 285 MG/DL (ref 32–197)
HCO3 BLDV-SCNC: 22 MMOL/L (ref 21–28)
HCT VFR BLD AUTO: 20.9 % (ref 40–53)
HCT VFR BLD AUTO: 21 % (ref 40–53)
HCT VFR BLD AUTO: 25.1 % (ref 40–53)
HCT VFR BLD AUTO: 25.3 % (ref 40–53)
HCT VFR BLD AUTO: 26.6 % (ref 40–53)
HCT VFR BLD AUTO: 26.9 % (ref 40–53)
HCT VFR BLD AUTO: 28.6 % (ref 40–53)
HCT VFR BLD AUTO: 28.6 % (ref 40–53)
HCT VFR BLD AUTO: 29.3 % (ref 40–53)
HGB BLD-MCNC: 6.5 G/DL (ref 13.3–17.7)
HGB BLD-MCNC: 6.5 G/DL (ref 13.3–17.7)
HGB BLD-MCNC: 7 G/DL (ref 13.3–17.7)
HGB BLD-MCNC: 7.9 G/DL (ref 13.3–17.7)
HGB BLD-MCNC: 8.1 G/DL (ref 13.3–17.7)
HGB BLD-MCNC: 8.5 G/DL (ref 13.3–17.7)
HGB BLD-MCNC: 8.6 G/DL (ref 13.3–17.7)
HGB BLD-MCNC: 9 G/DL (ref 13.3–17.7)
HGB BLD-MCNC: 9.1 G/DL (ref 13.3–17.7)
HGB BLD-MCNC: 9.4 G/DL (ref 13.3–17.7)
HGB UR QL STRIP: ABNORMAL
HGB UR QL STRIP: NEGATIVE
HOLD SPECIMEN: NORMAL
HYALINE CASTS: 8 /LPF
INR PPP: 1.32 (ref 0.85–1.15)
ISSUE DATE AND TIME: NORMAL
ISSUE DATE AND TIME: NORMAL
KETONES UR STRIP-MCNC: NEGATIVE MG/DL
KETONES UR STRIP-MCNC: NEGATIVE MG/DL
LACTATE SERPL-SCNC: 1.3 MMOL/L (ref 0.7–2)
LACTATE SERPL-SCNC: 1.9 MMOL/L (ref 0.7–2)
LACTATE SERPL-SCNC: 2.5 MMOL/L (ref 0.7–2)
LACTATE SERPL-SCNC: 2.6 MMOL/L (ref 0.7–2)
LACTATE SERPL-SCNC: 2.8 MMOL/L (ref 0.7–2)
LACTATE SERPL-SCNC: 3.4 MMOL/L (ref 0.7–2)
LACTATE SERPL-SCNC: 5 MMOL/L (ref 0.7–2)
LDH SERPL L TO P-CCNC: 187 U/L (ref 85–227)
LEUKOCYTE ESTERASE UR QL STRIP: ABNORMAL
LEUKOCYTE ESTERASE UR QL STRIP: NEGATIVE
LIPASE SERPL-CCNC: 25 U/L (ref 73–393)
LIPASE SERPL-CCNC: 30 U/L (ref 73–393)
LYMPHOCYTES # BLD MANUAL: 6.7 10E3/UL (ref 0.8–5.3)
LYMPHOCYTES # BLD MANUAL: 7.6 10E3/UL (ref 0.8–5.3)
LYMPHOCYTES # BLD MANUAL: 9.1 10E3/UL (ref 0.8–5.3)
LYMPHOCYTES NFR BLD MANUAL: 39 %
LYMPHOCYTES NFR BLD MANUAL: 49 %
LYMPHOCYTES NFR BLD MANUAL: 53 %
MCH RBC QN AUTO: 30.6 PG (ref 26.5–33)
MCH RBC QN AUTO: 30.8 PG (ref 26.5–33)
MCH RBC QN AUTO: 30.9 PG (ref 26.5–33)
MCH RBC QN AUTO: 31 PG (ref 26.5–33)
MCH RBC QN AUTO: 31 PG (ref 26.5–33)
MCH RBC QN AUTO: 31.5 PG (ref 26.5–33)
MCH RBC QN AUTO: 33.3 PG (ref 26.5–33)
MCHC RBC AUTO-ENTMCNC: 31 G/DL (ref 31.5–36.5)
MCHC RBC AUTO-ENTMCNC: 31.1 G/DL (ref 31.5–36.5)
MCHC RBC AUTO-ENTMCNC: 31.2 G/DL (ref 31.5–36.5)
MCHC RBC AUTO-ENTMCNC: 31.5 G/DL (ref 31.5–36.5)
MCHC RBC AUTO-ENTMCNC: 31.8 G/DL (ref 31.5–36.5)
MCHC RBC AUTO-ENTMCNC: 32 G/DL (ref 31.5–36.5)
MCHC RBC AUTO-ENTMCNC: 32 G/DL (ref 31.5–36.5)
MCHC RBC AUTO-ENTMCNC: 32.1 G/DL (ref 31.5–36.5)
MCHC RBC AUTO-ENTMCNC: 32.3 G/DL (ref 31.5–36.5)
MCV RBC AUTO: 100 FL (ref 78–100)
MCV RBC AUTO: 100 FL (ref 78–100)
MCV RBC AUTO: 101 FL (ref 78–100)
MCV RBC AUTO: 105 FL (ref 78–100)
MCV RBC AUTO: 96 FL (ref 78–100)
MCV RBC AUTO: 97 FL (ref 78–100)
METHADONE UR QL SCN: NOT DETECTED
MONOCYTES # BLD MANUAL: 0 10E3/UL (ref 0–1.3)
MONOCYTES # BLD MANUAL: 0.4 10E3/UL (ref 0–1.3)
MONOCYTES # BLD MANUAL: 0.6 10E3/UL (ref 0–1.3)
MONOCYTES NFR BLD MANUAL: 0 %
MONOCYTES NFR BLD MANUAL: 2 %
MONOCYTES NFR BLD MANUAL: 4 %
MUCOUS THREADS #/AREA URNS LPF: PRESENT /LPF
MUCOUS THREADS #/AREA URNS LPF: PRESENT /LPF
NEUTROPHILS # BLD MANUAL: 10.6 10E3/UL (ref 1.6–8.3)
NEUTROPHILS # BLD MANUAL: 6 10E3/UL (ref 1.6–8.3)
NEUTROPHILS # BLD MANUAL: 9.1 10E3/UL (ref 1.6–8.3)
NEUTROPHILS NFR BLD MANUAL: 42 %
NEUTROPHILS NFR BLD MANUAL: 49 %
NEUTROPHILS NFR BLD MANUAL: 61 %
NITRATE UR QL: NEGATIVE
NITRATE UR QL: NEGATIVE
NT-PROBNP SERPL-MCNC: 2751 PG/ML (ref 0–900)
O2/TOTAL GAS SETTING VFR VENT: 21 %
OPIATES UR QL SCN: NOT DETECTED
OXYCODONE UR QL SCN: NOT DETECTED
OXYHGB MFR BLDV: 92 % (ref 70–75)
PATH REPORT.COMMENTS IMP SPEC: NORMAL
PATH REPORT.FINAL DX SPEC: NORMAL
PATH REPORT.MICROSCOPIC SPEC OTHER STN: NORMAL
PATH REPORT.MICROSCOPIC SPEC OTHER STN: NORMAL
PCO2 BLDV: 31 MM HG (ref 40–50)
PCP UR QL SCN: NOT DETECTED
PH BLDV: 7.47 [PH] (ref 7.32–7.43)
PH UR STRIP: 5.5 [PH] (ref 4.7–8)
PH UR STRIP: 6 [PH] (ref 4.7–8)
PLAT MORPH BLD: ABNORMAL
PLAT MORPH BLD: NORMAL
PLATELET # BLD AUTO: 102 10E3/UL (ref 150–450)
PLATELET # BLD AUTO: 113 10E3/UL (ref 150–450)
PLATELET # BLD AUTO: 125 10E3/UL (ref 150–450)
PLATELET # BLD AUTO: 146 10E3/UL (ref 150–450)
PLATELET # BLD AUTO: 148 10E3/UL (ref 150–450)
PLATELET # BLD AUTO: 162 10E3/UL (ref 150–450)
PLATELET # BLD AUTO: 166 10E3/UL (ref 150–450)
PLATELET # BLD AUTO: 84 10E3/UL (ref 150–450)
PLATELET # BLD AUTO: 85 10E3/UL (ref 150–450)
PO2 BLDV: 98 MM HG (ref 25–47)
POTASSIUM (EXTERNAL): 4.7 MMOL/L (ref 3.5–5.1)
POTASSIUM BLD-SCNC: 3.6 MMOL/L (ref 3.4–5.3)
POTASSIUM BLD-SCNC: 3.7 MMOL/L (ref 3.4–5.3)
POTASSIUM BLD-SCNC: 3.7 MMOL/L (ref 3.4–5.3)
POTASSIUM BLD-SCNC: 3.9 MMOL/L (ref 3.4–5.3)
POTASSIUM BLD-SCNC: 4 MMOL/L (ref 3.4–5.3)
POTASSIUM BLD-SCNC: 4 MMOL/L (ref 3.4–5.3)
POTASSIUM BLD-SCNC: 4.3 MMOL/L (ref 3.4–5.3)
PROCALCITONIN SERPL-MCNC: 0.24 NG/ML
PROCALCITONIN SERPL-MCNC: 0.44 NG/ML
PROCALCITONIN SERPL-MCNC: 0.5 NG/ML
PROCALCITONIN SERPL-MCNC: 0.57 NG/ML
PROCALCITONIN SERPL-MCNC: 0.87 NG/ML
PROCALCITONIN SERPL-MCNC: 1.17 NG/ML
PROPOXYPH UR QL: NOT DETECTED
PROT SERPL-MCNC: 4.3 G/DL (ref 6.8–8.8)
PROT SERPL-MCNC: 4.5 G/DL (ref 6.8–8.8)
PROT SERPL-MCNC: 4.9 G/DL (ref 6.8–8.8)
PROT SERPL-MCNC: 4.9 G/DL (ref 6.8–8.8)
PROT SERPL-MCNC: 5 G/DL (ref 6.8–8.8)
PROT SERPL-MCNC: 5.1 G/DL (ref 6.8–8.8)
PROT SERPL-MCNC: 6.4 G/DL (ref 6.8–8.8)
RBC # BLD AUTO: 2.1 10E6/UL (ref 4.4–5.9)
RBC # BLD AUTO: 2.1 10E6/UL (ref 4.4–5.9)
RBC # BLD AUTO: 2.51 10E6/UL (ref 4.4–5.9)
RBC # BLD AUTO: 2.62 10E6/UL (ref 4.4–5.9)
RBC # BLD AUTO: 2.73 10E6/UL (ref 4.4–5.9)
RBC # BLD AUTO: 2.78 10E6/UL (ref 4.4–5.9)
RBC # BLD AUTO: 2.81 10E6/UL (ref 4.4–5.9)
RBC # BLD AUTO: 2.94 10E6/UL (ref 4.4–5.9)
RBC # BLD AUTO: 3.05 10E6/UL (ref 4.4–5.9)
RBC MORPH BLD: ABNORMAL
RBC MORPH BLD: NORMAL
RBC URINE: 1 /HPF
RBC URINE: 3 /HPF
RETICS # AUTO: 0.05 10E6/UL (ref 0.03–0.1)
RETICS/RBC NFR AUTO: 2.4 % (ref 0.5–2)
RETINOPATHY: NEGATIVE
RSV RNA SPEC NAA+PROBE: NEGATIVE
RSV RNA SPEC NAA+PROBE: NEGATIVE
SARS-COV-2 RNA RESP QL NAA+PROBE: NEGATIVE
SARS-COV-2 RNA RESP QL NAA+PROBE: NEGATIVE
SODIUM SERPL-SCNC: 131 MMOL/L (ref 133–144)
SODIUM SERPL-SCNC: 135 MMOL/L (ref 133–144)
SODIUM SERPL-SCNC: 135 MMOL/L (ref 133–144)
SODIUM SERPL-SCNC: 137 MMOL/L (ref 133–144)
SODIUM SERPL-SCNC: 138 MMOL/L (ref 133–144)
SODIUM SERPL-SCNC: 138 MMOL/L (ref 133–144)
SODIUM SERPL-SCNC: 142 MMOL/L (ref 133–144)
SP GR UR STRIP: 1.01 (ref 1–1.03)
SP GR UR STRIP: 1.01 (ref 1–1.03)
SPECIMEN EXPIRATION DATE: NORMAL
SQUAMOUS EPITHELIAL: 0 /HPF
SQUAMOUS EPITHELIAL: 0 /HPF
TRICYCLICS UR QL SCN: DETECTED
TROPONIN I SERPL HS-MCNC: 34 NG/L
TSH SERPL DL<=0.005 MIU/L-ACNC: 3.06 MU/L (ref 0.4–4)
UNIT ABO/RH: NORMAL
UNIT ABO/RH: NORMAL
UNIT NUMBER: NORMAL
UNIT NUMBER: NORMAL
UNIT STATUS: NORMAL
UNIT STATUS: NORMAL
UNIT TYPE ISBT: 5100
UNIT TYPE ISBT: 5100
UROBILINOGEN UR STRIP-MCNC: 2 MG/DL
UROBILINOGEN UR STRIP-MCNC: 3 MG/DL
VARIANT LYMPHS BLD QL SMEAR: PRESENT
WBC # BLD AUTO: 14.4 10E3/UL (ref 4–11)
WBC # BLD AUTO: 14.7 10E3/UL (ref 4–11)
WBC # BLD AUTO: 15 10E3/UL (ref 4–11)
WBC # BLD AUTO: 15.6 10E3/UL (ref 4–11)
WBC # BLD AUTO: 17.3 10E3/UL (ref 4–11)
WBC # BLD AUTO: 17.3 10E3/UL (ref 4–11)
WBC # BLD AUTO: 17.4 10E3/UL (ref 4–11)
WBC # BLD AUTO: 17.6 10E3/UL (ref 4–11)
WBC # BLD AUTO: 18.6 10E3/UL (ref 4–11)
WBC CLUMPS #/AREA URNS HPF: PRESENT /HPF
WBC URINE: 60 /HPF
WBC URINE: <1 /HPF

## 2022-01-01 PROCEDURE — 85027 COMPLETE CBC AUTOMATED: CPT | Performed by: INTERNAL MEDICINE

## 2022-01-01 PROCEDURE — 36415 COLL VENOUS BLD VENIPUNCTURE: CPT | Performed by: INTERNAL MEDICINE

## 2022-01-01 PROCEDURE — 83605 ASSAY OF LACTIC ACID: CPT | Performed by: INTERNAL MEDICINE

## 2022-01-01 PROCEDURE — 85007 BL SMEAR W/DIFF WBC COUNT: CPT | Performed by: INTERNAL MEDICINE

## 2022-01-01 PROCEDURE — 250N000013 HC RX MED GY IP 250 OP 250 PS 637: Performed by: INTERNAL MEDICINE

## 2022-01-01 PROCEDURE — 80053 COMPREHEN METABOLIC PANEL: CPT | Performed by: INTERNAL MEDICINE

## 2022-01-01 PROCEDURE — 86140 C-REACTIVE PROTEIN: CPT | Performed by: INTERNAL MEDICINE

## 2022-01-01 PROCEDURE — 93010 ELECTROCARDIOGRAM REPORT: CPT | Performed by: INTERNAL MEDICINE

## 2022-01-01 PROCEDURE — 80150 ASSAY OF AMIKACIN: CPT | Performed by: NURSE PRACTITIONER

## 2022-01-01 PROCEDURE — 82140 ASSAY OF AMMONIA: CPT | Performed by: STUDENT IN AN ORGANIZED HEALTH CARE EDUCATION/TRAINING PROGRAM

## 2022-01-01 PROCEDURE — 99285 EMERGENCY DEPT VISIT HI MDM: CPT | Mod: 25

## 2022-01-01 PROCEDURE — 84145 PROCALCITONIN (PCT): CPT | Performed by: INTERNAL MEDICINE

## 2022-01-01 PROCEDURE — 87040 BLOOD CULTURE FOR BACTERIA: CPT | Performed by: INTERNAL MEDICINE

## 2022-01-01 PROCEDURE — 258N000003 HC RX IP 258 OP 636: Performed by: NURSE PRACTITIONER

## 2022-01-01 PROCEDURE — 120N000001 HC R&B MED SURG/OB

## 2022-01-01 PROCEDURE — 87102 FUNGUS ISOLATION CULTURE: CPT | Performed by: STUDENT IN AN ORGANIZED HEALTH CARE EDUCATION/TRAINING PROGRAM

## 2022-01-01 PROCEDURE — 99285 EMERGENCY DEPT VISIT HI MDM: CPT | Performed by: STUDENT IN AN ORGANIZED HEALTH CARE EDUCATION/TRAINING PROGRAM

## 2022-01-01 PROCEDURE — 97530 THERAPEUTIC ACTIVITIES: CPT | Mod: GP

## 2022-01-01 PROCEDURE — 82077 ASSAY SPEC XCP UR&BREATH IA: CPT | Performed by: STUDENT IN AN ORGANIZED HEALTH CARE EDUCATION/TRAINING PROGRAM

## 2022-01-01 PROCEDURE — 87637 SARSCOV2&INF A&B&RSV AMP PRB: CPT | Performed by: INTERNAL MEDICINE

## 2022-01-01 PROCEDURE — 85060 BLOOD SMEAR INTERPRETATION: CPT | Performed by: PATHOLOGY

## 2022-01-01 PROCEDURE — 200N000001 HC R&B ICU

## 2022-01-01 PROCEDURE — 30233N1 TRANSFUSION OF NONAUTOLOGOUS RED BLOOD CELLS INTO PERIPHERAL VEIN, PERCUTANEOUS APPROACH: ICD-10-PCS | Performed by: INTERNAL MEDICINE

## 2022-01-01 PROCEDURE — 36415 COLL VENOUS BLD VENIPUNCTURE: CPT | Performed by: NURSE PRACTITIONER

## 2022-01-01 PROCEDURE — 250N000011 HC RX IP 250 OP 636: Performed by: STUDENT IN AN ORGANIZED HEALTH CARE EDUCATION/TRAINING PROGRAM

## 2022-01-01 PROCEDURE — 258N000003 HC RX IP 258 OP 636: Performed by: INTERNAL MEDICINE

## 2022-01-01 PROCEDURE — G1010 CDSM STANSON: HCPCS

## 2022-01-01 PROCEDURE — 99239 HOSP IP/OBS DSCHRG MGMT >30: CPT | Performed by: NURSE PRACTITIONER

## 2022-01-01 PROCEDURE — 85018 HEMOGLOBIN: CPT | Performed by: STUDENT IN AN ORGANIZED HEALTH CARE EDUCATION/TRAINING PROGRAM

## 2022-01-01 PROCEDURE — 84145 PROCALCITONIN (PCT): CPT | Performed by: STUDENT IN AN ORGANIZED HEALTH CARE EDUCATION/TRAINING PROGRAM

## 2022-01-01 PROCEDURE — 84155 ASSAY OF PROTEIN SERUM: CPT | Performed by: STUDENT IN AN ORGANIZED HEALTH CARE EDUCATION/TRAINING PROGRAM

## 2022-01-01 PROCEDURE — 83880 ASSAY OF NATRIURETIC PEPTIDE: CPT | Performed by: INTERNAL MEDICINE

## 2022-01-01 PROCEDURE — 250N000011 HC RX IP 250 OP 636: Performed by: INTERNAL MEDICINE

## 2022-01-01 PROCEDURE — 250N000011 HC RX IP 250 OP 636: Performed by: NURSE PRACTITIONER

## 2022-01-01 PROCEDURE — C9803 HOPD COVID-19 SPEC COLLECT: HCPCS

## 2022-01-01 PROCEDURE — 85610 PROTHROMBIN TIME: CPT | Performed by: STUDENT IN AN ORGANIZED HEALTH CARE EDUCATION/TRAINING PROGRAM

## 2022-01-01 PROCEDURE — 82805 BLOOD GASES W/O2 SATURATION: CPT | Performed by: STUDENT IN AN ORGANIZED HEALTH CARE EDUCATION/TRAINING PROGRAM

## 2022-01-01 PROCEDURE — 250N000012 HC RX MED GY IP 250 OP 636 PS 637: Performed by: INTERNAL MEDICINE

## 2022-01-01 PROCEDURE — 85007 BL SMEAR W/DIFF WBC COUNT: CPT | Performed by: STUDENT IN AN ORGANIZED HEALTH CARE EDUCATION/TRAINING PROGRAM

## 2022-01-01 PROCEDURE — 99233 SBSQ HOSP IP/OBS HIGH 50: CPT | Performed by: NURSE PRACTITIONER

## 2022-01-01 PROCEDURE — 99233 SBSQ HOSP IP/OBS HIGH 50: CPT | Performed by: INTERNAL MEDICINE

## 2022-01-01 PROCEDURE — 86901 BLOOD TYPING SEROLOGIC RH(D): CPT | Performed by: STUDENT IN AN ORGANIZED HEALTH CARE EDUCATION/TRAINING PROGRAM

## 2022-01-01 PROCEDURE — 86922 COMPATIBILITY TEST ANTIGLOB: CPT | Performed by: INTERNAL MEDICINE

## 2022-01-01 PROCEDURE — 72220 X-RAY EXAM SACRUM TAILBONE: CPT

## 2022-01-01 PROCEDURE — 97166 OT EVAL MOD COMPLEX 45 MIN: CPT | Mod: GO

## 2022-01-01 PROCEDURE — 84484 ASSAY OF TROPONIN QUANT: CPT | Performed by: STUDENT IN AN ORGANIZED HEALTH CARE EDUCATION/TRAINING PROGRAM

## 2022-01-01 PROCEDURE — 83605 ASSAY OF LACTIC ACID: CPT | Performed by: STUDENT IN AN ORGANIZED HEALTH CARE EDUCATION/TRAINING PROGRAM

## 2022-01-01 PROCEDURE — 97162 PT EVAL MOD COMPLEX 30 MIN: CPT | Mod: GP | Performed by: PHYSICAL THERAPIST

## 2022-01-01 PROCEDURE — 36415 COLL VENOUS BLD VENIPUNCTURE: CPT | Performed by: STUDENT IN AN ORGANIZED HEALTH CARE EDUCATION/TRAINING PROGRAM

## 2022-01-01 PROCEDURE — 93005 ELECTROCARDIOGRAM TRACING: CPT

## 2022-01-01 PROCEDURE — 86140 C-REACTIVE PROTEIN: CPT | Performed by: STUDENT IN AN ORGANIZED HEALTH CARE EDUCATION/TRAINING PROGRAM

## 2022-01-01 PROCEDURE — 83615 LACTATE (LD) (LDH) ENZYME: CPT | Performed by: INTERNAL MEDICINE

## 2022-01-01 PROCEDURE — 250N000013 HC RX MED GY IP 250 OP 250 PS 637: Performed by: NURSE PRACTITIONER

## 2022-01-01 PROCEDURE — P9016 RBC LEUKOCYTES REDUCED: HCPCS | Performed by: INTERNAL MEDICINE

## 2022-01-01 PROCEDURE — 83605 ASSAY OF LACTIC ACID: CPT | Performed by: NURSE PRACTITIONER

## 2022-01-01 PROCEDURE — 87077 CULTURE AEROBIC IDENTIFY: CPT | Performed by: STUDENT IN AN ORGANIZED HEALTH CARE EDUCATION/TRAINING PROGRAM

## 2022-01-01 PROCEDURE — 84443 ASSAY THYROID STIM HORMONE: CPT | Performed by: STUDENT IN AN ORGANIZED HEALTH CARE EDUCATION/TRAINING PROGRAM

## 2022-01-01 PROCEDURE — 96374 THER/PROPH/DIAG INJ IV PUSH: CPT

## 2022-01-01 PROCEDURE — 72100 X-RAY EXAM L-S SPINE 2/3 VWS: CPT

## 2022-01-01 PROCEDURE — 71045 X-RAY EXAM CHEST 1 VIEW: CPT

## 2022-01-01 PROCEDURE — 87637 SARSCOV2&INF A&B&RSV AMP PRB: CPT | Performed by: STUDENT IN AN ORGANIZED HEALTH CARE EDUCATION/TRAINING PROGRAM

## 2022-01-01 PROCEDURE — 81001 URINALYSIS AUTO W/SCOPE: CPT | Mod: 59 | Performed by: INTERNAL MEDICINE

## 2022-01-01 PROCEDURE — 83690 ASSAY OF LIPASE: CPT | Performed by: INTERNAL MEDICINE

## 2022-01-01 PROCEDURE — 258N000003 HC RX IP 258 OP 636: Performed by: STUDENT IN AN ORGANIZED HEALTH CARE EDUCATION/TRAINING PROGRAM

## 2022-01-01 PROCEDURE — 81001 URINALYSIS AUTO W/SCOPE: CPT | Performed by: STUDENT IN AN ORGANIZED HEALTH CARE EDUCATION/TRAINING PROGRAM

## 2022-01-01 PROCEDURE — 87086 URINE CULTURE/COLONY COUNT: CPT | Performed by: STUDENT IN AN ORGANIZED HEALTH CARE EDUCATION/TRAINING PROGRAM

## 2022-01-01 PROCEDURE — 99223 1ST HOSP IP/OBS HIGH 75: CPT | Mod: AI | Performed by: INTERNAL MEDICINE

## 2022-01-01 PROCEDURE — 83010 ASSAY OF HAPTOGLOBIN QUANT: CPT | Performed by: INTERNAL MEDICINE

## 2022-01-01 PROCEDURE — 87040 BLOOD CULTURE FOR BACTERIA: CPT | Performed by: STUDENT IN AN ORGANIZED HEALTH CARE EDUCATION/TRAINING PROGRAM

## 2022-01-01 PROCEDURE — 96361 HYDRATE IV INFUSION ADD-ON: CPT

## 2022-01-01 PROCEDURE — 85045 AUTOMATED RETICULOCYTE COUNT: CPT | Performed by: INTERNAL MEDICINE

## 2022-01-01 PROCEDURE — 80306 DRUG TEST PRSMV INSTRMNT: CPT | Performed by: INTERNAL MEDICINE

## 2022-01-01 PROCEDURE — 99284 EMERGENCY DEPT VISIT MOD MDM: CPT | Performed by: INTERNAL MEDICINE

## 2022-01-01 PROCEDURE — 83690 ASSAY OF LIPASE: CPT | Performed by: STUDENT IN AN ORGANIZED HEALTH CARE EDUCATION/TRAINING PROGRAM

## 2022-01-01 PROCEDURE — 85027 COMPLETE CBC AUTOMATED: CPT | Performed by: STUDENT IN AN ORGANIZED HEALTH CARE EDUCATION/TRAINING PROGRAM

## 2022-01-01 PROCEDURE — 82077 ASSAY SPEC XCP UR&BREATH IA: CPT | Performed by: INTERNAL MEDICINE

## 2022-01-01 PROCEDURE — 72170 X-RAY EXAM OF PELVIS: CPT

## 2022-01-01 PROCEDURE — 87040 BLOOD CULTURE FOR BACTERIA: CPT | Performed by: NURSE PRACTITIONER

## 2022-01-01 RX ORDER — LIDOCAINE 40 MG/G
CREAM TOPICAL
Status: CANCELLED | OUTPATIENT
Start: 2022-01-01

## 2022-01-01 RX ORDER — LIDOCAINE 40 MG/G
CREAM TOPICAL
Status: DISCONTINUED | OUTPATIENT
Start: 2022-01-01 | End: 2022-01-01

## 2022-01-01 RX ORDER — ACETAMINOPHEN 325 MG/1
650 TABLET ORAL ONCE
Status: DISCONTINUED | OUTPATIENT
Start: 2022-01-01 | End: 2022-01-01 | Stop reason: HOSPADM

## 2022-01-01 RX ORDER — CEFTRIAXONE SODIUM 1 G/50ML
1 INJECTION, SOLUTION INTRAVENOUS EVERY 24 HOURS
Status: DISCONTINUED | OUTPATIENT
Start: 2022-01-01 | End: 2022-01-01

## 2022-01-01 RX ORDER — LEVETIRACETAM 500 MG/1
500 TABLET ORAL 2 TIMES DAILY
Status: DISCONTINUED | OUTPATIENT
Start: 2022-01-01 | End: 2022-01-01 | Stop reason: HOSPADM

## 2022-01-01 RX ORDER — ONDANSETRON 2 MG/ML
4 INJECTION INTRAMUSCULAR; INTRAVENOUS EVERY 6 HOURS PRN
Status: DISCONTINUED | OUTPATIENT
Start: 2022-01-01 | End: 2022-01-01 | Stop reason: HOSPADM

## 2022-01-01 RX ORDER — AZITHROMYCIN 250 MG/1
TABLET, FILM COATED ORAL
Qty: 6 TABLET | Refills: 0 | Status: SHIPPED | OUTPATIENT
Start: 2022-01-01 | End: 2022-01-01

## 2022-01-01 RX ORDER — DEXTROSE MONOHYDRATE 25 G/50ML
25-50 INJECTION, SOLUTION INTRAVENOUS
Status: DISCONTINUED | OUTPATIENT
Start: 2022-01-01 | End: 2022-01-01 | Stop reason: HOSPADM

## 2022-01-01 RX ORDER — FENTANYL CITRATE 50 UG/ML
50 INJECTION, SOLUTION INTRAMUSCULAR; INTRAVENOUS
Status: CANCELLED | OUTPATIENT
Start: 2022-01-01

## 2022-01-01 RX ORDER — IOPAMIDOL 755 MG/ML
55 INJECTION, SOLUTION INTRAVASCULAR ONCE
Status: COMPLETED | OUTPATIENT
Start: 2022-01-01 | End: 2022-01-01

## 2022-01-01 RX ORDER — PREDNISONE 20 MG/1
TABLET ORAL
Qty: 5 TABLET | Refills: 0 | Status: SHIPPED | OUTPATIENT
Start: 2022-01-01 | End: 2022-01-01

## 2022-01-01 RX ORDER — HEPARIN SODIUM 5000 [USP'U]/.5ML
5000 INJECTION, SOLUTION INTRAVENOUS; SUBCUTANEOUS EVERY 12 HOURS
Status: DISCONTINUED | OUTPATIENT
Start: 2022-01-01 | End: 2022-01-01 | Stop reason: HOSPADM

## 2022-01-01 RX ORDER — SODIUM CHLORIDE, SODIUM LACTATE, POTASSIUM CHLORIDE, CALCIUM CHLORIDE 600; 310; 30; 20 MG/100ML; MG/100ML; MG/100ML; MG/100ML
INJECTION, SOLUTION INTRAVENOUS CONTINUOUS
Status: CANCELLED | OUTPATIENT
Start: 2022-01-01

## 2022-01-01 RX ORDER — FENTANYL CITRATE 50 UG/ML
50 INJECTION, SOLUTION INTRAMUSCULAR; INTRAVENOUS EVERY 5 MIN PRN
Status: CANCELLED | OUTPATIENT
Start: 2022-01-01

## 2022-01-01 RX ORDER — PREDNISONE 20 MG/1
20 TABLET ORAL ONCE
Status: COMPLETED | OUTPATIENT
Start: 2022-01-01 | End: 2022-01-01

## 2022-01-01 RX ORDER — AMIKACIN SULFATE 250 MG/ML
7.5 INJECTION, SOLUTION INTRAMUSCULAR; INTRAVENOUS EVERY 12 HOURS
Status: DISCONTINUED | OUTPATIENT
Start: 2022-01-01 | End: 2022-01-01 | Stop reason: CLARIF

## 2022-01-01 RX ORDER — PHENYLEPHRINE HYDROCHLORIDE 25 MG/ML
1 SOLUTION/ DROPS OPHTHALMIC ONCE
Status: DISCONTINUED | OUTPATIENT
Start: 2022-01-01 | End: 2022-01-01 | Stop reason: HOSPADM

## 2022-01-01 RX ORDER — NICOTINE POLACRILEX 4 MG
15-30 LOZENGE BUCCAL
Status: DISCONTINUED | OUTPATIENT
Start: 2022-01-01 | End: 2022-01-01 | Stop reason: HOSPADM

## 2022-01-01 RX ORDER — MEPERIDINE HYDROCHLORIDE 25 MG/ML
12.5 INJECTION INTRAMUSCULAR; INTRAVENOUS; SUBCUTANEOUS
Status: CANCELLED | OUTPATIENT
Start: 2022-01-01

## 2022-01-01 RX ORDER — ACETAMINOPHEN 650 MG/20.3ML
650 LIQUID ORAL EVERY 4 HOURS PRN
Status: DISCONTINUED | OUTPATIENT
Start: 2022-01-01 | End: 2022-01-01 | Stop reason: HOSPADM

## 2022-01-01 RX ORDER — ALBUTEROL SULFATE 0.83 MG/ML
2.5 SOLUTION RESPIRATORY (INHALATION) EVERY 4 HOURS PRN
Status: DISCONTINUED | OUTPATIENT
Start: 2022-01-01 | End: 2022-01-01 | Stop reason: HOSPADM

## 2022-01-01 RX ORDER — DIVALPROEX SODIUM 125 MG/1
1000 CAPSULE, COATED PELLETS ORAL EVERY 12 HOURS SCHEDULED
Status: DISCONTINUED | OUTPATIENT
Start: 2022-01-01 | End: 2022-01-01 | Stop reason: HOSPADM

## 2022-01-01 RX ORDER — CYCLOPENTOLATE HYDROCHLORIDE 20 MG/ML
1 SOLUTION/ DROPS OPHTHALMIC
Status: ACTIVE | OUTPATIENT
Start: 2022-01-01 | End: 2022-01-01

## 2022-01-01 RX ORDER — METOPROLOL TARTRATE 1 MG/ML
1-2 INJECTION, SOLUTION INTRAVENOUS EVERY 5 MIN PRN
Status: CANCELLED | OUTPATIENT
Start: 2022-01-01

## 2022-01-01 RX ORDER — AZITHROMYCIN 250 MG/1
500 TABLET, FILM COATED ORAL ONCE
Status: COMPLETED | OUTPATIENT
Start: 2022-01-01 | End: 2022-01-01

## 2022-01-01 RX ORDER — HYDROMORPHONE HYDROCHLORIDE 1 MG/ML
0.2 INJECTION, SOLUTION INTRAMUSCULAR; INTRAVENOUS; SUBCUTANEOUS EVERY 5 MIN PRN
Status: CANCELLED | OUTPATIENT
Start: 2022-01-01

## 2022-01-01 RX ORDER — DEXTROSE MONOHYDRATE 25 G/50ML
25-50 INJECTION, SOLUTION INTRAVENOUS
Status: DISCONTINUED | OUTPATIENT
Start: 2022-01-01 | End: 2022-01-01

## 2022-01-01 RX ORDER — QUETIAPINE FUMARATE 100 MG/1
100 TABLET, FILM COATED ORAL DAILY
Status: DISCONTINUED | OUTPATIENT
Start: 2022-01-01 | End: 2022-01-01

## 2022-01-01 RX ORDER — PHENYLEPHRINE HYDROCHLORIDE 100 MG/ML
1 SOLUTION/ DROPS OPHTHALMIC
Status: DISCONTINUED | OUTPATIENT
Start: 2022-01-01 | End: 2022-01-01 | Stop reason: HOSPADM

## 2022-01-01 RX ORDER — LORAZEPAM 2 MG/ML
2 INJECTION INTRAMUSCULAR
Status: DISCONTINUED | OUTPATIENT
Start: 2022-01-01 | End: 2022-01-01 | Stop reason: HOSPADM

## 2022-01-01 RX ORDER — NOREPINEPHRINE BITARTRATE 0.02 MG/ML
.01-.3 INJECTION, SOLUTION INTRAVENOUS CONTINUOUS
Status: DISCONTINUED | OUTPATIENT
Start: 2022-01-01 | End: 2022-01-01

## 2022-01-01 RX ORDER — CITALOPRAM HYDROBROMIDE 20 MG/1
40 TABLET ORAL DAILY
Status: DISCONTINUED | OUTPATIENT
Start: 2022-01-01 | End: 2022-01-01 | Stop reason: HOSPADM

## 2022-01-01 RX ORDER — ONDANSETRON 4 MG/1
4 TABLET, ORALLY DISINTEGRATING ORAL EVERY 6 HOURS PRN
Status: DISCONTINUED | OUTPATIENT
Start: 2022-01-01 | End: 2022-01-01 | Stop reason: HOSPADM

## 2022-01-01 RX ORDER — ASPIRIN 81 MG/1
81 TABLET, CHEWABLE ORAL DAILY
Status: DISCONTINUED | OUTPATIENT
Start: 2022-01-01 | End: 2022-01-01 | Stop reason: HOSPADM

## 2022-01-01 RX ORDER — CEFEPIME HYDROCHLORIDE 2 G/50ML
2 INJECTION, SOLUTION INTRAVENOUS ONCE
Status: COMPLETED | OUTPATIENT
Start: 2022-01-01 | End: 2022-01-01

## 2022-01-01 RX ORDER — ONDANSETRON 4 MG/1
4 TABLET, ORALLY DISINTEGRATING ORAL EVERY 30 MIN PRN
Status: CANCELLED | OUTPATIENT
Start: 2022-01-01

## 2022-01-01 RX ORDER — LIDOCAINE 40 MG/G
CREAM TOPICAL
Status: DISCONTINUED | OUTPATIENT
Start: 2022-01-01 | End: 2022-01-01 | Stop reason: HOSPADM

## 2022-01-01 RX ORDER — IOPAMIDOL 755 MG/ML
50 INJECTION, SOLUTION INTRAVASCULAR ONCE
Status: COMPLETED | OUTPATIENT
Start: 2022-01-01 | End: 2022-01-01

## 2022-01-01 RX ORDER — DIVALPROEX SODIUM 500 MG/1
1000 TABLET, EXTENDED RELEASE ORAL 2 TIMES DAILY
Status: DISCONTINUED | OUTPATIENT
Start: 2022-01-01 | End: 2022-01-01 | Stop reason: ALTCHOICE

## 2022-01-01 RX ORDER — NICOTINE POLACRILEX 4 MG
15-30 LOZENGE BUCCAL
Status: DISCONTINUED | OUTPATIENT
Start: 2022-01-01 | End: 2022-01-01

## 2022-01-01 RX ORDER — SODIUM CHLORIDE 9 MG/ML
INJECTION, SOLUTION INTRAVENOUS CONTINUOUS
Status: DISCONTINUED | OUTPATIENT
Start: 2022-01-01 | End: 2022-01-01 | Stop reason: HOSPADM

## 2022-01-01 RX ORDER — ONDANSETRON 2 MG/ML
4 INJECTION INTRAMUSCULAR; INTRAVENOUS EVERY 30 MIN PRN
Status: CANCELLED | OUTPATIENT
Start: 2022-01-01

## 2022-01-01 RX ORDER — PROPARACAINE HYDROCHLORIDE 5 MG/ML
1 SOLUTION/ DROPS OPHTHALMIC ONCE
Status: DISCONTINUED | OUTPATIENT
Start: 2022-01-01 | End: 2022-01-01 | Stop reason: HOSPADM

## 2022-01-01 RX ORDER — QUETIAPINE FUMARATE 100 MG/1
100 TABLET, FILM COATED ORAL AT BEDTIME
Status: DISCONTINUED | OUTPATIENT
Start: 2022-01-01 | End: 2022-01-01 | Stop reason: HOSPADM

## 2022-01-01 RX ORDER — HYDRALAZINE HYDROCHLORIDE 20 MG/ML
2.5-5 INJECTION INTRAMUSCULAR; INTRAVENOUS EVERY 10 MIN PRN
Status: CANCELLED | OUTPATIENT
Start: 2022-01-01

## 2022-01-01 RX ORDER — DIPHENHYDRAMINE HYDROCHLORIDE 50 MG/ML
50 INJECTION INTRAMUSCULAR; INTRAVENOUS
Status: DISCONTINUED | OUTPATIENT
Start: 2022-01-01 | End: 2022-01-01 | Stop reason: HOSPADM

## 2022-01-01 RX ORDER — METHYLPREDNISOLONE SODIUM SUCCINATE 125 MG/2ML
125 INJECTION, POWDER, LYOPHILIZED, FOR SOLUTION INTRAMUSCULAR; INTRAVENOUS
Status: DISCONTINUED | OUTPATIENT
Start: 2022-01-01 | End: 2022-01-01 | Stop reason: HOSPADM

## 2022-01-01 RX ORDER — PHENYLEPHRINE HYDROCHLORIDE 100 MG/ML
1 SOLUTION/ DROPS OPHTHALMIC ONCE
Status: DISCONTINUED | OUTPATIENT
Start: 2022-01-01 | End: 2022-01-01 | Stop reason: HOSPADM

## 2022-01-01 RX ORDER — OXYCODONE HYDROCHLORIDE 5 MG/1
5 TABLET ORAL EVERY 4 HOURS PRN
Status: CANCELLED | OUTPATIENT
Start: 2022-01-01

## 2022-01-01 RX ADMIN — PREDNISONE 20 MG: 20 TABLET ORAL at 06:23

## 2022-01-01 RX ADMIN — DOXYCYCLINE 100 MG: 100 INJECTION, POWDER, LYOPHILIZED, FOR SOLUTION INTRAVENOUS at 04:40

## 2022-01-01 RX ADMIN — SODIUM CHLORIDE 1000 ML: 9 INJECTION, SOLUTION INTRAVENOUS at 03:04

## 2022-01-01 RX ADMIN — SODIUM CHLORIDE, PRESERVATIVE FREE: 5 INJECTION INTRAVENOUS at 15:51

## 2022-01-01 RX ADMIN — DIVALPROEX SODIUM 1000 MG: 125 CAPSULE, COATED PELLETS ORAL at 08:14

## 2022-01-01 RX ADMIN — IMIPENEM AND CILASTATIN SODIUM 500 MG: 500; 500 INJECTION, POWDER, FOR SOLUTION INTRAVENOUS at 19:49

## 2022-01-01 RX ADMIN — DOXYCYCLINE 100 MG: 100 INJECTION, POWDER, LYOPHILIZED, FOR SOLUTION INTRAVENOUS at 16:59

## 2022-01-01 RX ADMIN — DIVALPROEX SODIUM 1000 MG: 500 TABLET, FILM COATED, EXTENDED RELEASE ORAL at 10:13

## 2022-01-01 RX ADMIN — SODIUM CHLORIDE 1000 ML: 9 INJECTION, SOLUTION INTRAVENOUS at 11:43

## 2022-01-01 RX ADMIN — CITALOPRAM HYDROBROMIDE 40 MG: 20 TABLET, FILM COATED ORAL at 08:45

## 2022-01-01 RX ADMIN — CEFTRIAXONE SODIUM 1 G: 1 INJECTION, SOLUTION INTRAVENOUS at 15:52

## 2022-01-01 RX ADMIN — AMIKACIN SULFATE 550 MG: 250 INJECTION, SOLUTION INTRAMUSCULAR; INTRAVENOUS at 16:18

## 2022-01-01 RX ADMIN — SODIUM CHLORIDE 500 ML: 9 INJECTION, SOLUTION INTRAVENOUS at 12:45

## 2022-01-01 RX ADMIN — SULFAMETHOXAZOLE AND TRIMETHOPRIM 280 MG: 80; 16 INJECTION, SOLUTION, CONCENTRATE INTRAVENOUS at 02:48

## 2022-01-01 RX ADMIN — LEVETIRACETAM 500 MG: 500 TABLET, FILM COATED ORAL at 21:04

## 2022-01-01 RX ADMIN — SULFAMETHOXAZOLE AND TRIMETHOPRIM 30.4 MG: 80; 16 INJECTION, SOLUTION, CONCENTRATE INTRAVENOUS at 13:08

## 2022-01-01 RX ADMIN — AZITHROMYCIN MONOHYDRATE 500 MG: 250 TABLET ORAL at 06:23

## 2022-01-01 RX ADMIN — DOXYCYCLINE 100 MG: 100 INJECTION, POWDER, LYOPHILIZED, FOR SOLUTION INTRAVENOUS at 04:28

## 2022-01-01 RX ADMIN — IMIPENEM AND CILASTATIN SODIUM 500 MG: 500; 500 INJECTION, POWDER, FOR SOLUTION INTRAVENOUS at 01:50

## 2022-01-01 RX ADMIN — IMIPENEM AND CILASTATIN SODIUM 500 MG: 500; 500 INJECTION, POWDER, FOR SOLUTION INTRAVENOUS at 08:42

## 2022-01-01 RX ADMIN — ASPIRIN 81 MG CHEWABLE TABLET 81 MG: 81 TABLET CHEWABLE at 08:45

## 2022-01-01 RX ADMIN — SULFAMETHOXAZOLE AND TRIMETHOPRIM 280 MG: 80; 16 INJECTION, SOLUTION, CONCENTRATE INTRAVENOUS at 14:57

## 2022-01-01 RX ADMIN — QUETIAPINE 100 MG: 100 TABLET, FILM COATED ORAL at 21:37

## 2022-01-01 RX ADMIN — IMIPENEM AND CILASTATIN SODIUM 500 MG: 500; 500 INJECTION, POWDER, FOR SOLUTION INTRAVENOUS at 14:58

## 2022-01-01 RX ADMIN — SODIUM CHLORIDE, PRESERVATIVE FREE: 5 INJECTION INTRAVENOUS at 14:39

## 2022-01-01 RX ADMIN — IOPAMIDOL 55 ML: 755 INJECTION, SOLUTION INTRAVENOUS at 11:48

## 2022-01-01 RX ADMIN — HEPARIN SODIUM 5000 UNITS: 5000 INJECTION, SOLUTION INTRAVENOUS; SUBCUTANEOUS at 14:38

## 2022-01-01 RX ADMIN — LEVETIRACETAM 500 MG: 500 TABLET, FILM COATED ORAL at 10:25

## 2022-01-01 RX ADMIN — SULFAMETHOXAZOLE AND TRIMETHOPRIM 280 MG: 80; 16 INJECTION, SOLUTION, CONCENTRATE INTRAVENOUS at 02:34

## 2022-01-01 RX ADMIN — QUETIAPINE 100 MG: 100 TABLET, FILM COATED ORAL at 08:45

## 2022-01-01 RX ADMIN — IOPAMIDOL 50 ML: 755 INJECTION, SOLUTION INTRAVENOUS at 17:17

## 2022-01-01 RX ADMIN — DOXYCYCLINE 100 MG: 100 INJECTION, POWDER, LYOPHILIZED, FOR SOLUTION INTRAVENOUS at 17:34

## 2022-01-01 RX ADMIN — ASPIRIN 81 MG CHEWABLE TABLET 81 MG: 81 TABLET CHEWABLE at 10:24

## 2022-01-01 RX ADMIN — AMIKACIN SULFATE 550 MG: 250 INJECTION, SOLUTION INTRAMUSCULAR; INTRAVENOUS at 15:51

## 2022-01-01 RX ADMIN — DOXYCYCLINE 100 MG: 100 INJECTION, POWDER, LYOPHILIZED, FOR SOLUTION INTRAVENOUS at 05:15

## 2022-01-01 RX ADMIN — SODIUM CHLORIDE, PRESERVATIVE FREE: 5 INJECTION INTRAVENOUS at 02:55

## 2022-01-01 RX ADMIN — LEVETIRACETAM 500 MG: 500 TABLET, FILM COATED ORAL at 21:49

## 2022-01-01 RX ADMIN — AMIKACIN SULFATE 550 MG: 250 INJECTION, SOLUTION INTRAMUSCULAR; INTRAVENOUS at 03:25

## 2022-01-01 RX ADMIN — HEPARIN SODIUM 5000 UNITS: 5000 INJECTION, SOLUTION INTRAVENOUS; SUBCUTANEOUS at 14:24

## 2022-01-01 RX ADMIN — LEVETIRACETAM 500 MG: 500 TABLET, FILM COATED ORAL at 10:13

## 2022-01-01 RX ADMIN — SULFAMETHOXAZOLE AND TRIMETHOPRIM 280 MG: 80; 16 INJECTION, SOLUTION, CONCENTRATE INTRAVENOUS at 20:17

## 2022-01-01 RX ADMIN — QUETIAPINE 100 MG: 100 TABLET, FILM COATED ORAL at 21:49

## 2022-01-01 RX ADMIN — ASPIRIN 81 MG CHEWABLE TABLET 81 MG: 81 TABLET CHEWABLE at 10:13

## 2022-01-01 RX ADMIN — ASPIRIN 81 MG CHEWABLE TABLET 81 MG: 81 TABLET CHEWABLE at 08:39

## 2022-01-01 RX ADMIN — DOXYCYCLINE 100 MG: 100 INJECTION, POWDER, LYOPHILIZED, FOR SOLUTION INTRAVENOUS at 18:42

## 2022-01-01 RX ADMIN — SULFAMETHOXAZOLE AND TRIMETHOPRIM 280 MG: 80; 16 INJECTION, SOLUTION, CONCENTRATE INTRAVENOUS at 09:46

## 2022-01-01 RX ADMIN — LEVETIRACETAM 500 MG: 500 TABLET, FILM COATED ORAL at 21:37

## 2022-01-01 RX ADMIN — LEVETIRACETAM 500 MG: 500 TABLET, FILM COATED ORAL at 20:54

## 2022-01-01 RX ADMIN — DIVALPROEX SODIUM 1000 MG: 500 TABLET, FILM COATED, EXTENDED RELEASE ORAL at 10:25

## 2022-01-01 RX ADMIN — LEVETIRACETAM 500 MG: 500 TABLET, FILM COATED ORAL at 08:21

## 2022-01-01 RX ADMIN — DIVALPROEX SODIUM 1000 MG: 125 CAPSULE, COATED PELLETS ORAL at 21:50

## 2022-01-01 RX ADMIN — IMIPENEM AND CILASTATIN SODIUM 500 MG: 500; 500 INJECTION, POWDER, FOR SOLUTION INTRAVENOUS at 02:48

## 2022-01-01 RX ADMIN — IMIPENEM AND CILASTATIN SODIUM 500 MG: 500; 500 INJECTION, POWDER, FOR SOLUTION INTRAVENOUS at 20:16

## 2022-01-01 RX ADMIN — DOXYCYCLINE 100 MG: 100 INJECTION, POWDER, LYOPHILIZED, FOR SOLUTION INTRAVENOUS at 04:18

## 2022-01-01 RX ADMIN — LEVETIRACETAM 500 MG: 500 TABLET, FILM COATED ORAL at 08:45

## 2022-01-01 RX ADMIN — DIVALPROEX SODIUM 1000 MG: 500 TABLET, FILM COATED, EXTENDED RELEASE ORAL at 08:09

## 2022-01-01 RX ADMIN — SODIUM CHLORIDE, PRESERVATIVE FREE: 5 INJECTION INTRAVENOUS at 04:27

## 2022-01-01 RX ADMIN — ASPIRIN 81 MG CHEWABLE TABLET 81 MG: 81 TABLET CHEWABLE at 08:09

## 2022-01-01 RX ADMIN — DIVALPROEX SODIUM 1000 MG: 500 TABLET, FILM COATED, EXTENDED RELEASE ORAL at 20:53

## 2022-01-01 RX ADMIN — SULFAMETHOXAZOLE AND TRIMETHOPRIM 280 MG: 80; 16 INJECTION, SOLUTION, CONCENTRATE INTRAVENOUS at 20:53

## 2022-01-01 RX ADMIN — QUETIAPINE 100 MG: 100 TABLET, FILM COATED ORAL at 20:53

## 2022-01-01 RX ADMIN — DIVALPROEX SODIUM 1000 MG: 500 TABLET, FILM COATED, EXTENDED RELEASE ORAL at 08:45

## 2022-01-01 RX ADMIN — HEPARIN SODIUM 5000 UNITS: 5000 INJECTION, SOLUTION INTRAVENOUS; SUBCUTANEOUS at 01:56

## 2022-01-01 RX ADMIN — CITALOPRAM HYDROBROMIDE 40 MG: 20 TABLET, FILM COATED ORAL at 10:24

## 2022-01-01 RX ADMIN — CITALOPRAM HYDROBROMIDE 40 MG: 20 TABLET, FILM COATED ORAL at 08:09

## 2022-01-01 RX ADMIN — IMIPENEM AND CILASTATIN SODIUM 500 MG: 500; 500 INJECTION, POWDER, FOR SOLUTION INTRAVENOUS at 08:04

## 2022-01-01 RX ADMIN — DOXYCYCLINE 100 MG: 100 INJECTION, POWDER, LYOPHILIZED, FOR SOLUTION INTRAVENOUS at 16:52

## 2022-01-01 RX ADMIN — SODIUM CHLORIDE 500 ML: 9 INJECTION, SOLUTION INTRAVENOUS at 06:52

## 2022-01-01 RX ADMIN — ACETAMINOPHEN 650 MG: 160 SOLUTION ORAL at 22:06

## 2022-01-01 RX ADMIN — IMIPENEM AND CILASTATIN SODIUM 500 MG: 500; 500 INJECTION, POWDER, FOR SOLUTION INTRAVENOUS at 14:16

## 2022-01-01 RX ADMIN — DOXYCYCLINE 100 MG: 100 INJECTION, POWDER, LYOPHILIZED, FOR SOLUTION INTRAVENOUS at 16:26

## 2022-01-01 RX ADMIN — DIVALPROEX SODIUM 1000 MG: 500 TABLET, FILM COATED, EXTENDED RELEASE ORAL at 20:33

## 2022-01-01 RX ADMIN — SULFAMETHOXAZOLE AND TRIMETHOPRIM 280 MG: 80; 16 INJECTION, SOLUTION, CONCENTRATE INTRAVENOUS at 08:50

## 2022-01-01 RX ADMIN — INSULIN ASPART 1 UNITS: 100 INJECTION, SOLUTION INTRAVENOUS; SUBCUTANEOUS at 12:52

## 2022-01-01 RX ADMIN — CEFEPIME HYDROCHLORIDE 2 G: 2 INJECTION, SOLUTION INTRAVENOUS at 11:35

## 2022-01-01 RX ADMIN — SULFAMETHOXAZOLE AND TRIMETHOPRIM 250 MG: 80; 16 INJECTION, SOLUTION, CONCENTRATE INTRAVENOUS at 13:54

## 2022-01-01 RX ADMIN — ACETAMINOPHEN 650 MG: 160 SOLUTION ORAL at 04:19

## 2022-01-01 RX ADMIN — LEVETIRACETAM 500 MG: 500 TABLET, FILM COATED ORAL at 20:33

## 2022-01-01 RX ADMIN — AMIKACIN SULFATE 550 MG: 250 INJECTION, SOLUTION INTRAMUSCULAR; INTRAVENOUS at 03:55

## 2022-01-01 RX ADMIN — INSULIN ASPART 1 UNITS: 100 INJECTION, SOLUTION INTRAVENOUS; SUBCUTANEOUS at 16:59

## 2022-01-01 RX ADMIN — LEVETIRACETAM 500 MG: 500 TABLET, FILM COATED ORAL at 08:09

## 2022-01-01 RX ADMIN — CITALOPRAM HYDROBROMIDE 40 MG: 20 TABLET, FILM COATED ORAL at 10:13

## 2022-01-01 RX ADMIN — SODIUM CHLORIDE, PRESERVATIVE FREE: 5 INJECTION INTRAVENOUS at 18:41

## 2022-01-01 RX ADMIN — SODIUM CHLORIDE 1000 ML: 9 INJECTION, SOLUTION INTRAVENOUS at 10:17

## 2022-01-01 RX ADMIN — DOXYCYCLINE 100 MG: 100 INJECTION, POWDER, LYOPHILIZED, FOR SOLUTION INTRAVENOUS at 04:41

## 2022-01-01 RX ADMIN — DIVALPROEX SODIUM 1000 MG: 500 TABLET, FILM COATED, EXTENDED RELEASE ORAL at 21:37

## 2022-01-01 RX ADMIN — CEFTRIAXONE SODIUM 1 G: 1 INJECTION, SOLUTION INTRAVENOUS at 15:46

## 2022-01-01 RX ADMIN — DIVALPROEX SODIUM 1000 MG: 500 TABLET, FILM COATED, EXTENDED RELEASE ORAL at 21:04

## 2022-01-01 RX ADMIN — CITALOPRAM HYDROBROMIDE 40 MG: 20 TABLET, FILM COATED ORAL at 08:38

## 2022-01-01 RX ADMIN — CEFTRIAXONE SODIUM 1 G: 1 INJECTION, SOLUTION INTRAVENOUS at 17:59

## 2022-01-01 ASSESSMENT — ENCOUNTER SYMPTOMS
CHEST TIGHTNESS: 0
FEVER: 0
PALPITATIONS: 0
LIGHT-HEADEDNESS: 0
COLOR CHANGE: 0
DIZZINESS: 0
DYSURIA: 0
WEAKNESS: 0
SLEEP DISTURBANCE: 0
ANAL BLEEDING: 0
FATIGUE: 1
COUGH: 1
MYALGIAS: 0
FREQUENCY: 0
NUMBNESS: 0
SHORTNESS OF BREATH: 0
WHEEZING: 0
FLANK PAIN: 0
HEADACHES: 0
VOMITING: 0
BLOOD IN STOOL: 0
ABDOMINAL PAIN: 0
NECK PAIN: 0
BACK PAIN: 1
CONFUSION: 0
DIAPHORESIS: 0
CHILLS: 0
ABDOMINAL DISTENTION: 0
NAUSEA: 0
VOICE CHANGE: 0

## 2022-01-01 ASSESSMENT — ACTIVITIES OF DAILY LIVING (ADL)
ADLS_ACUITY_SCORE: 49
ADLS_ACUITY_SCORE: 47
ADLS_ACUITY_SCORE: 43
ADLS_ACUITY_SCORE: 37
ADLS_ACUITY_SCORE: 49
ADLS_ACUITY_SCORE: 49
ADLS_ACUITY_SCORE: 37
ADLS_ACUITY_SCORE: 45
ADLS_ACUITY_SCORE: 49
ADLS_ACUITY_SCORE: 47
ADLS_ACUITY_SCORE: 49
ADLS_ACUITY_SCORE: 45
ADLS_ACUITY_SCORE: 37
ADLS_ACUITY_SCORE: 37
ADLS_ACUITY_SCORE: 45
ADLS_ACUITY_SCORE: 49
ADLS_ACUITY_SCORE: 45
ADLS_ACUITY_SCORE: 49
ADLS_ACUITY_SCORE: 43
ADLS_ACUITY_SCORE: 33
ADLS_ACUITY_SCORE: 47
ADLS_ACUITY_SCORE: 45
ADLS_ACUITY_SCORE: 49
ADLS_ACUITY_SCORE: 37
ADLS_ACUITY_SCORE: 39
ADLS_ACUITY_SCORE: 47
ADLS_ACUITY_SCORE: 45
ADLS_ACUITY_SCORE: 45
ADLS_ACUITY_SCORE: 47
ADLS_ACUITY_SCORE: 45
ADLS_ACUITY_SCORE: 45
ADLS_ACUITY_SCORE: 43
ADLS_ACUITY_SCORE: 49
ADLS_ACUITY_SCORE: 43
ADLS_ACUITY_SCORE: 49
ADLS_ACUITY_SCORE: 33
ADLS_ACUITY_SCORE: 49
ADLS_ACUITY_SCORE: 37
ADLS_ACUITY_SCORE: 49
ADLS_ACUITY_SCORE: 49
ADLS_ACUITY_SCORE: 37
ADLS_ACUITY_SCORE: 49
ADLS_ACUITY_SCORE: 45
ADLS_ACUITY_SCORE: 49
ADLS_ACUITY_SCORE: 43
ADLS_ACUITY_SCORE: 37
ADLS_ACUITY_SCORE: 49
ADLS_ACUITY_SCORE: 49
ADLS_ACUITY_SCORE: 45
ADLS_ACUITY_SCORE: 47
ADLS_ACUITY_SCORE: 37
ADLS_ACUITY_SCORE: 43
ADLS_ACUITY_SCORE: 43
ADLS_ACUITY_SCORE: 49
ADLS_ACUITY_SCORE: 47

## 2022-07-05 NOTE — ED PROVIDER NOTES
History     Chief Complaint   Patient presents with     Generalized Weakness     The history is provided by the patient.   Fatigue  Severity:  Moderate  Onset quality:  Gradual  Timing:  Constant  Progression:  Worsening  Chronicity:  New  Associated symptoms: cough    Associated symptoms: no abdominal pain, no chest pain, no dizziness, no dysuria, no fever, no frequency, no headaches, no myalgias, no nausea, no shortness of breath and no vomiting          Allergies:  Allergies   Allergen Reactions     Blood Transfusion Related (Informational Only) Other (See Comments)     Patient has a history of a warm autoantibody.  A delay in compatible RBCs may occur.     Sulfa Drugs        Problem List:    Patient Active Problem List    Diagnosis Date Noted     Seizure disorder (H) 10/29/2015     Priority: High     Class: Acute     Pneumonia, community acquired 10/29/2015     Priority: High     Class: Acute     Anemia 12/13/2020     Priority: Medium     CLL (chronic lymphocytic leukemia) (H) 12/13/2020     Priority: Medium     Community acquired bacterial pneumonia 12/13/2020     Priority: Medium     At high risk for infection due to chemotherapy 12/13/2020     Priority: Medium     Diabetes mellitus, type II (H) 10/29/2015     Priority: Medium     Elevated brain natriuretic peptide (BNP) level 10/29/2015     Priority: Medium     Hemolytic anemia associated with lymphoproliferative disorder (H) 10/19/2015     Priority: Medium     Anxiety 11/01/2010     Priority: Medium     Cognitive dysfunction 11/01/2010     Priority: Medium     Overview:   IMO Update 10/11       Bipolar disorder, unspecified (H) 10/28/2004     Priority: Medium     Updated per 10/1/17 IMO import          Past Medical History:    Past Medical History:   Diagnosis Date     Asthma      Bipolar affective disorder (H)      Cannabis abuse      Chronic lymphatic leukemia (H)      DM (diabetes mellitus) (H)      Dyslipidemia      Hematemesis      HTN (hypertension)       Seizure disorder (H)      Seizures (H)      Status epilepticus (H)      TBI (traumatic brain injury) (H)        Past Surgical History:    Past Surgical History:   Procedure Laterality Date     FOREARM SURGERY      Left     TONSILLECTOMY       ZZC REPAIR SHOULDER CAPSULE,ANTERIOR      Left     ZZC TOTAL HIP ARTHROPLASTY      Right       Family History:    Family History   Problem Relation Age of Onset     Other Cancer Mother        Social History:  Marital Status:   [4]  Social History     Tobacco Use     Smoking status: Current Every Day Smoker     Packs/day: 1.00     Types: Cigarettes     Smokeless tobacco: Never Used   Substance Use Topics     Alcohol use: No     Alcohol/week: 0.0 standard drinks     Drug use: Yes     Types: Marijuana     Comment: marijuana        Medications:    albuterol (PROVENTIL) (2.5 MG/3ML) 0.083% neb solution  aspirin (ASA) 81 MG EC tablet  azithromycin (ZITHROMAX Z-BARBARA) 250 MG tablet  citalopram (CELEXA) 40 MG tablet  divalproex sodium extended-release (DEPAKOTE ER) 500 MG 24 hr tablet  predniSONE (DELTASONE) 20 MG tablet  ibrutinib (IMBRUVICA) 140 MG tablet  levETIRAcetam (KEPPRA) 500 MG tablet  pioglitazone (ACTOS) 15 MG tablet  prochlorperazine (COMPAZINE) 10 MG tablet  QUEtiapine (SEROQUEL) 200 MG tablet  simvastatin (ZOCOR) 20 MG tablet  tiZANidine (ZANAFLEX) 2 MG tablet          Review of Systems   Constitutional: Positive for fatigue. Negative for chills, diaphoresis and fever.   HENT: Negative for voice change.    Eyes: Negative for visual disturbance.   Respiratory: Positive for cough. Negative for chest tightness, shortness of breath and wheezing.    Cardiovascular: Negative for chest pain, palpitations and leg swelling.   Gastrointestinal: Negative for abdominal distention, abdominal pain, anal bleeding, blood in stool, nausea and vomiting.   Genitourinary: Negative for decreased urine volume, dysuria, flank pain and frequency.   Musculoskeletal: Positive for back  pain. Negative for gait problem, myalgias and neck pain.   Skin: Negative for color change, pallor and rash.   Neurological: Negative for dizziness, syncope, weakness, light-headedness, numbness and headaches.   Psychiatric/Behavioral: Negative for confusion, sleep disturbance and suicidal ideas.       Physical Exam   BP: 134/81  Pulse: 93  Temp: 97.5  F (36.4  C)  Resp: 18  SpO2: 94 %      Physical Exam  Vitals and nursing note reviewed.   Constitutional:       Appearance: He is well-developed.   HENT:      Head: Normocephalic and atraumatic.   Eyes:      Conjunctiva/sclera: Conjunctivae normal.      Pupils: Pupils are equal, round, and reactive to light.   Neck:      Thyroid: No thyromegaly.      Vascular: No JVD.      Trachea: No tracheal deviation.   Cardiovascular:      Rate and Rhythm: Normal rate and regular rhythm.      Heart sounds: Normal heart sounds. No murmur heard.    No gallop.   Pulmonary:      Effort: Pulmonary effort is normal. No respiratory distress.      Breath sounds: Normal breath sounds. No stridor. No wheezing or rales.   Chest:      Chest wall: No tenderness.   Abdominal:      General: Bowel sounds are normal. There is no distension.      Palpations: Abdomen is soft. There is no mass.      Tenderness: There is no abdominal tenderness. There is no guarding or rebound.   Musculoskeletal:         General: No tenderness. Normal range of motion.      Cervical back: Normal range of motion and neck supple.   Lymphadenopathy:      Cervical: No cervical adenopathy.   Skin:     General: Skin is warm.      Coloration: Skin is not pale.      Findings: No erythema or rash.   Neurological:      Mental Status: He is alert and oriented to person, place, and time.   Psychiatric:         Behavior: Behavior normal.         ED Course                 Procedures                Results for orders placed or performed during the hospital encounter of 07/05/22 (from the past 24 hour(s))   CBC with Platelets &  Differential    Narrative    The following orders were created for panel order CBC with Platelets & Differential.  Procedure                               Abnormality         Status                     ---------                               -----------         ------                     CBC with platelets and d...[[id number]]  Abnormal            Final result               Manual Differential[[id number]]          Abnormal            Final result                 Please view results for these tests on the individual orders.   Comprehensive metabolic panel   Result Value Ref Range    Sodium 135 133 - 144 mmol/L    Potassium 4.0 3.4 - 5.3 mmol/L    Chloride 105 94 - 109 mmol/L    Carbon Dioxide (CO2) 24 20 - 32 mmol/L    Anion Gap 6 3 - 14 mmol/L    Urea Nitrogen 22 7 - 30 mg/dL    Creatinine 1.01 0.66 - 1.25 mg/dL    Calcium 7.5 (L) 8.5 - 10.1 mg/dL    Glucose 147 (H) 70 - 99 mg/dL    Alkaline Phosphatase 128 40 - 150 U/L    AST 31 0 - 45 U/L    ALT 12 0 - 70 U/L    Protein Total 4.9 (L) 6.8 - 8.8 g/dL    Albumin 1.6 (L) 3.4 - 5.0 g/dL    Bilirubin Total 0.5 0.2 - 1.3 mg/dL    GFR Estimate 81 >60 mL/min/1.73m2   Lipase   Result Value Ref Range    Lipase 30 (L) 73 - 393 U/L   CRP inflammation   Result Value Ref Range    CRP Inflammation 90.2 (H) 0.0 - 8.0 mg/L   Lactic acid whole blood   Result Value Ref Range    Lactic Acid 1.9 0.7 - 2.0 mmol/L   Ethyl Alcohol Level   Result Value Ref Range    Alcohol ethyl <0.01 <=0.01 g/dL   CBC with platelets and differential   Result Value Ref Range    WBC Count 14.4 (H) 4.0 - 11.0 10e3/uL    RBC Count 2.73 (L) 4.40 - 5.90 10e6/uL    Hemoglobin 9.1 (L) 13.3 - 17.7 g/dL    Hematocrit 28.6 (L) 40.0 - 53.0 %     (H) 78 - 100 fL    MCH 33.3 (H) 26.5 - 33.0 pg    MCHC 31.8 31.5 - 36.5 g/dL    RDW 14.7 10.0 - 15.0 %    Platelet Count 166 150 - 450 10e3/uL   Manual Differential   Result Value Ref Range    % Neutrophils 42 %    % Lymphocytes 53 %    % Monocytes 4 %    % Eosinophils 0  %    % Basophils 1 %    Absolute Neutrophils 6.0 1.6 - 8.3 10e3/uL    Absolute Lymphocytes 7.6 (H) 0.8 - 5.3 10e3/uL    Absolute Monocytes 0.6 0.0 - 1.3 10e3/uL    Absolute Eosinophils 0.0 0.0 - 0.7 10e3/uL    Absolute Basophils 0.1 0.0 - 0.2 10e3/uL    RBC Morphology Confirmed RBC Indices     Platelet Assessment  Automated Count Confirmed. Platelet morphology is normal.     Automated Count Confirmed. Platelet morphology is normal.    Reactive Lymphocytes Present (A) None Seen   Symptomatic; Unknown Influenza A/B & SARS-CoV2 (COVID-19) Virus PCR Multiplex Nasopharyngeal    Specimen: Nasopharyngeal; Swab   Result Value Ref Range    Influenza A PCR Negative Negative    Influenza B PCR Negative Negative    RSV PCR Negative Negative    SARS CoV2 PCR Negative Negative    Narrative    Testing was performed using the Xpert Xpress CoV2/Flu/RSV Assay on the [employer] GeneXpert Instrument. This test should be ordered for the detection of SARS-CoV-2 and influenza viruses in individuals who meet clinical and/or epidemiological criteria. Test performance is unknown in asymptomatic patients. This test is for in vitro diagnostic use under the FDA EUA for laboratories certified under CLIA to perform high or moderate complexity testing. This test has not been FDA cleared or approved. A negative result does not rule out the presence of PCR inhibitors in the specimen or target RNA in concentration below the limit of detection for the assay. If only one viral target is positive but coinfection with multiple targets is suspected, the sample should be re-tested with another FDA cleared, approved, or authorized test, if coinfection would change clinical management. This test was validated by the [facility] [employer]. These laboratories are certified under the Clinical  Laboratory Improvement Amendments of 1988 (CLIA-88) as qualified to perform high complexity laboratory testing.   Urine Drugs of Abuse Screen    Narrative    The  following orders were created for panel order Urine Drugs of Abuse Screen.  Procedure                               Abnormality         Status                     ---------                               -----------         ------                     Urine Drugs of Abuse Scr...[[id number]]  Abnormal            Final result                 Please view results for these tests on the individual orders.   UA reflex to Microscopic and Culture    Specimen: Urine, Midstream   Result Value Ref Range    Color Urine Yellow Colorless, Straw, Light Yellow, Yellow    Appearance Urine Clear Clear    Glucose Urine Negative Negative mg/dL    Bilirubin Urine Negative Negative    Ketones Urine Negative Negative mg/dL    Specific Gravity Urine 1.013 1.003 - 1.035    Blood Urine Negative Negative    pH Urine 6.0 4.7 - 8.0    Protein Albumin Urine 10  (A) Negative mg/dL    Urobilinogen Urine 3.0 (A) Normal, 2.0 mg/dL    Nitrite Urine Negative Negative    Leukocyte Esterase Urine Negative Negative    Mucus Urine Present (A) None Seen /LPF    RBC Urine 1 <=2 /HPF    WBC Urine <1 <=5 /HPF    Squamous Epithelials Urine 0 <=1 /HPF    Narrative    Urine Culture not indicated   Urine Drugs of Abuse Screen Panel 13   Result Value Ref Range    Cannabinoids (98-mnu-0-carboxy-9-THC) Detected (A) Not Detected, Indeterminate    Phencyclidine Not Detected Not Detected, Indeterminate    Cocaine (Benzoylecgonine) Not Detected Not Detected, Indeterminate    Methamphetamine (d-Methamphetamine) Not Detected Not Detected, Indeterminate    Opiates (Morphine) Not Detected Not Detected, Indeterminate    Amphetamine (d-Amphetamine) Not Detected Not Detected, Indeterminate    Benzodiazepines (Nordiazepam) Not Detected Not Detected, Indeterminate    Tricyclic Antidepressants (Desipramine) Detected (A) Not Detected, Indeterminate    Methadone Not Detected Not Detected, Indeterminate    Barbiturates (Butalbital) Not Detected Not Detected, Indeterminate    Oxycodone  Not Detected Not Detected, Indeterminate    Propoxyphene (Norpropoxyphene) Not Detected Not Detected, Indeterminate    Buprenorphine Not Detected Not Detected, Indeterminate       Medications   0.9% sodium chloride BOLUS (1,000 mLs Intravenous New Bag 7/5/22 0304)   azithromycin (ZITHROMAX) tablet 500 mg (500 mg Oral Given 7/5/22 0623)   predniSONE (DELTASONE) tablet 20 mg (20 mg Oral Given 7/5/22 0623)       Assessments & Plan (with Medical Decision Making)   Fatigue, cough off and on    Labs reviewed, elevated WBC, improving compared to last time, pt has leukemia and on po chemo  Denies any fever  xrays reviewed , no acute traumatic injury  CXR: multi focal pneumonia?reported by Jayshree barajas prednison started, D C home, follow-up with PCP  I have reviewed the nursing notes.    I have reviewed the findings, diagnosis, plan and need for follow up with the patient.      New Prescriptions    AZITHROMYCIN (ZITHROMAX Z-BARBARA) 250 MG TABLET    Two tablets on the first day, then one tablet daily for the next 4 days    PREDNISONE (DELTASONE) 20 MG TABLET    1 tab daily for 3 days, then 1/2 tab daily for 4 days       Final diagnoses:   Other fatigue   Pneumonia due to infectious organism, unspecified laterality, unspecified part of lung       7/5/2022   [facility]     Dillon Monroe MD  07/05/22 2061

## 2022-07-05 NOTE — ED TRIAGE NOTES
Patient arrives via EMT with reports of increased weakness and falling on his buttocks. Patient reports 9/10 pain on his buttocks. Patient has a history of leukemia and diabetes.      Triage Assessment     Row Name 07/05/22 0114       Triage Assessment (Adult)    Airway WDL WDL       Respiratory WDL    Respiratory WDL WDL       Skin Circulation/Temperature WDL    Skin Circulation/Temperature WDL WDL       Cardiac WDL    Cardiac WDL WDL       Peripheral/Neurovascular WDL    Peripheral Neurovascular WDL WDL       Cognitive/Neuro/Behavioral WDL    Cognitive/Neuro/Behavioral WDL WDL

## 2022-07-05 NOTE — ED NOTES
"Patient sound asleep; wakes easily.  Patient's IV removed in preparation of discharge at 0730.  Patient reports that his pain is still 10/10 \"ass cheek\"  Discharge VS obtained.    "

## 2022-07-14 NOTE — TELEPHONE ENCOUNTER
Left message to call and schedule covid test    DOS 7-25-22 w/ Dr Summers [place] [facility] [place] draw date 2-4 in clinic or 1-2 days at home test and take picture and bring to surgery with)

## 2022-07-24 NOTE — TELEPHONE ENCOUNTER
Nurse Triage SBAR    Is this a 2nd Level Triage? NO    Situation/Background: Daughter is calling to find out what time patient should arrive for surgery tomorrow. No CTC on file; patient provided verbal consent. Clinic is closed at this time.     Recommendation: Per disposition, information requested, this writer unable to locate arrival time in EMR. Advised caller to call the clinic tomorrow morning during office hours. Caller verbalized understanding and agrees with plan.    Cassidy Cruz RN on 7/24/2022 at 4:57 PM    Reason for Disposition    Question about upcoming scheduled test, no triage required and triager able to answer question    Protocols used: INFORMATION ONLY CALL - NO TRIAGE-A-

## 2022-07-25 PROBLEM — N30.00 ACUTE CYSTITIS WITHOUT HEMATURIA: Status: ACTIVE | Noted: 2022-01-01

## 2022-07-25 PROBLEM — A41.9 SEPSIS, DUE TO UNSPECIFIED ORGANISM, UNSPECIFIED WHETHER ACUTE ORGAN DYSFUNCTION PRESENT (H): Status: ACTIVE | Noted: 2022-01-01

## 2022-07-25 PROBLEM — C91.10 CLL (CHRONIC LYMPHOCYTIC LEUKEMIA) (H): Status: ACTIVE | Noted: 2020-12-13

## 2022-07-25 PROBLEM — D64.9 ANEMIA: Status: ACTIVE | Noted: 2020-12-13

## 2022-07-25 NOTE — OR NURSING
Patient presents with daughter.  Daughter notes that patient had bronchitis and does not seem to have recovered.   Patient has not been eating and drinking per his norm.  Anesthesia notified and in to eval patient.

## 2022-07-25 NOTE — ED NOTES
DATE:  7/25/2022   TIME OF RECEIPT FROM LAB:  0495  LAB TEST:  Lactic  LAB VALUE:  2.5  RESULTS GIVEN WITH READ-BACK TO (PROVIDER):  Dr. Simental  TIME LAB VALUE REPORTED TO PROVIDER:   5801

## 2022-07-25 NOTE — CARE PLAN
Prior to Admission Medication Reconciliation:     Medications added:   [x] None  [] As listed below:    Medications deleted:   [x] None  [] As listed below:    Medications marked for review/removal by attending:  [x] None  [] As listed below:    Changes made to existing medications:   [] None  [x] Updated strengths and frequencies to most current.   [] As listed below:    Pertinent notes/medications patient takes different than prescribed:     Unable to ascertain if patient takes any medications different than prescribed.     Note: Seroquel recently reduced from 200 mg to 100 mg on 7/15/22    Last times/dates taken verified with patient:  [] Yes- completed myself  [] Prepared PTA medlist for review only. (will not be available to review personally)  [] Did not review with patient. Rx verification only. Review completed by nursing.    [] Nurse completed no changes made (double checked entries)  [x] Unable to review with patient at this time: per patient- he took no medications this morning but is confused about the time he got here. Unable to confidently say if he took his AM meds today. Unable to get through to his daughter.     Allergies listed at another location:  [x]Allergies match allergies listed in Epic  []Other allergies listed:    Allergy review:    [x]Did not review: reviewed by nursing  []Did not review: pt unable at this time  []Verified current existing allergies or NKDA: no changes made   []Patient confirmed current existing allergies and new allergies added:    Medication reconciliation sources:   [x]Patient  []Patient family member/emergency contact: **  [x][facility] Report Review:  Home Medications     - Last Reconciled 07/19/22 by Alexi Unger MD    [x]albuterol sulfate 2.5 mg/3 mL (0.083 %) solution for nebulization 2.5 mg (3 mL) continuous nebulization Q4H PRN   [x]aspirin 81 mg tablet,delayed release 81 mg PO DAILY   [x]citalopram 40 mg tablet 40 mg PO DAILY   [x]divalproex 500 mg  tablet,extended release 24 hr 1,000 mg (2 x 500 mg) PO BID 90 days   [x]ibrutinib 420 mg tablet 420 mg PO DAILY   [x]levetiracetam 500 mg tablet 500 mg PO BID   [x]pioglitazone 15 mg tablet 15 mg PO DAILY   [x]prochlorperazine maleate 10 mg tablet (Compazine) 10 mg PO Q6H PRN   [x]quetiapine 100 mg tablet 100 mg PO DAILY   [x]simvastatin 20 mg tablet 20 mg PO QPM   [x]vitamins  A,C,E-zinc-copper (PreserVision AREDS) 2 tabs PO BID   [x]Epic Chart Review  [x]Care Everywhere review  [x]Pharmacy med list/phone call: [facility]    imbruvica 420 mg tablet 1 tablet daily 7/21/22 mailed out    [x]Outside meds dispense report: see below  []Nursing home or Assisted Living MAR:  []Other: **    Pharmacy desired at discharge: uses [facility]     Is patient on coumadin?   [x]No  []Yes    Requests for consultation by provider or pharmacist:   [] Patient understands why all of their meds were prescribed and how to take them. No questions.   [] Managing party has no questions.   [] Patient/ managing party has questions about the following:  [x] Did not review with patient. Cannot assess.     Fill dates and reported compliancy:  [x] Fill dates coincide with reported compliancy for all/most maintenance meds.   [] Not applicable. Patient is not taking any maintenance medications at this time.   [] Fill dates do not coincide with compliancy with maintenance meds. See notes in PTA medlist and in comments.    [] Fill dates do not coincide with the following medications but pt reports compliancy:  [] Did not review with patient. Cannot assess.     Historian accuracy:  [] Excellent- alert and oriented, understands why meds were prescribed and how to take, able to answer specifics  [] Good- alert and oriented, understands why meds were prescribed and how to take, some confusion   [] Fair- alert and oriented, doesn't know medications without list, cannot answer specifics about medications, but has a decent process for  which to take at home  [] Poor- does not know medications, may not have a process to take at home, may be cognitively unable to review at this time  []Medication management done by family member or facility, no concerns about historian accuracy.   [x] Did not review with patient. Cannot assess.     Medication Management:  [] Manages meds independently  [] Family member/ other party manages meds/assists:  [] Meds managed by staff at facility  [] Meds set up by home care, family/other party helps administer  [] Meds set up by home care, self administers  [x] Did not review with patient. Cannot assess.     Other medications aside from PTA:  [x] Denies taking any medications aside from those listed in PTA meds  [] Reports taking another medication(s) but cannot recall the name(s)  [] Refuses to say.  [] Did not review with patient. Cannot assess.     Comments: pt is not a reliable source at this time. Meds updated using [facility] active medication and dispense history (see below). Fill dates reflect compliancy. Unable to get through to patient's daughter. Will complete as unable to assess and attempt to review again when pt is medically able or I am able to get through to daughter.       Bhavna Jacobs on 7/25/2022 at 1:44 PM       Notifying appropriate party of changes/additions/discrepancies:  [x]No pertinent changes made, notification not necessary.   [] Notified attending provider via text page/phone call  [] Notified attending provider in person  [] Notified pharmacy  [] Notified nurse  [] Medications have not been reconciled by a provider yet, notification not necessary  [] Pt is not admitted to floor yet, PTA meds completed before admission.   Medications Prior to Admission   Medication Sig Dispense Refill Last Dose     albuterol (PROVENTIL) (2.5 MG/3ML) 0.083% neb solution Take 2.5 mg by nebulization every 4 hours as needed for shortness of breath / dyspnea or wheezing        aspirin (ASA) 81 MG EC tablet Take 81  mg by mouth daily        citalopram (CELEXA) 40 MG tablet Take 40 mg by mouth daily         divalproex sodium extended-release (DEPAKOTE ER) 500 MG 24 hr tablet Take 1,000 mg by mouth 2 times daily         ibrutinib (IMBRUVICA) 420 MG tablet Take 420 mg by mouth daily         levETIRAcetam (KEPPRA) 500 MG tablet Take 500 mg by mouth 2 times daily        Multiple Vitamins-Minerals (PRESERVISION AREDS PO) Take 2 capsules by mouth 2 times daily        pioglitazone (ACTOS) 15 MG tablet Take 15 mg by mouth daily         prochlorperazine (COMPAZINE) 10 MG tablet Take 10 mg by mouth every 6 hours as needed        QUEtiapine (SEROQUEL) 100 MG tablet Take 100 mg by mouth daily        simvastatin (ZOCOR) 20 MG tablet Take 20 mg by mouth every evening         tiZANidine (ZANAFLEX) 2 MG tablet Take 1 tablet (2 mg) by mouth 3 times daily as needed for muscle spasms 15 tablet 0                Medication Dispense History (from 7/25/2021 to 7/25/2022)  Expand All  Collapse All    Albuterol Sulfate     Dispensed Days Supply Quantity Provider Pharmacy   ALBUTEROL 0.083% 3ML NEB 02/28/2022 15 270 mL Alexi Unger MD [facility] ...   ALBUTEROL 0.083% 3ML NEB 01/05/2022 15 270 mL Alexi Unger, MD [facility] ...        Azithromycin     Dispensed Days Supply Quantity Provider Pharmacy   AZITHROMYCIN 250MG BARBARA TAB 07/06/2022 5 6 Units Dillon Monroe MD [facility] ...        Citalopram Hydrobromide     Dispensed Days Supply Quantity Provider Pharmacy   CITALOPRAM 40MG     TAB 07/10/2022 90 90 Units Alexi Unger MD [facility] ...   CITALOPRAM 40MG     TAB 02/27/2022 90 90 Units Alexi Unger MD [facility] ...   CITALOPRAM 40MG     TAB 01/05/2022 30 30 Units Alexi Unger, MD [facility] ...        Divalproex Sodium     Dispensed Days Supply Quantity Provider Pharmacy   DIVALPROEX ER 500MG  TAB 07/18/2022 30 120 Units Alexi Unger, MD [facility] ...    DIVALPROEX ER 500MG  TAB 06/25/2022 30 120 Units Alexi Unger, MD [facility] ...   DIVALPROEX ER 500MG  TAB 05/07/2022 30 120 Units Alexi Unger, MD [facility] ...   DIVALPROEX ER 500MG  TAB 01/21/2022 90 360 Units Alexi Unger, MD [facility] ...   DIVALPROEX ER 500MG  TAB 11/30/2021 30 120 Units Alexi Unger, MD [facility] ...   DIVALPROEX ER 500MG  TAB 10/26/2021 30 120 Units Alexi Unger, MD [facility] ...   DIVALPROEX ER 500MG  TAB 09/15/2021 30 120 Units Alexi Unger, MD [facility] ...   DIVALPROEX ER 500MG  TAB 08/07/2021 30 120 Units Alexi Unger, MD [facility] ...        Ibrutinib     Dispensed Days Supply Quantity Provider Pharmacy   IMBRUVICA     09/22/2021 28 28 tablet [facility]...   IMBRUVICA     08/23/2021 28 28 tablet GREGOR,[facility]...   IMBRUVICA     07/26/2021 28 28 tablet GREGOR,[facility]...        Ketorolac Tromethamine     Dispensed Days Supply Quantity Provider Pharmacy   KETOROLAC 0.5% SOL 12/10/2021 47 10 mL Mani Summers MD [facility] ...        Moxifloxacin HCl     Dispensed Days Supply Quantity Provider Pharmacy   MOXIFLOXACIN 0.5%   SOL 12/10/2021 11 3 mL Mani Summers MD [facility] ...        Pioglitazone HCl     Dispensed Days Supply Quantity Provider Pharmacy   PIOGLITAZONE 15MG TAB 06/03/2022 90 90 Units Alexi Unger, MD [facility] ...   PIOGLITAZONE 15MG TAB 01/18/2022 90 90 Units Alexi Unger, MD [facility] ...   PIOGLITAZONE 15MG TAB 11/30/2021 30 30 Units Alexi Unger, MD [facility] ...   PIOGLITAZONE HCL 15MG   TAB 10/26/2021 30 30 Units Alexi Unger, MD [facility] ...   PIOGLITAZONE HCL 15MG   TAB 09/15/2021 30 30 Units Alexi Unger, MD [facility] ...   PIOGLITAZONE HCL 15MG   TAB 08/13/2021 30 30 Units  Alexi Unger, MD [facility] ...        QUEtiapine Fumarate     Dispensed Days Supply Quantity Provider Pharmacy   QUEtiapine Fumarate 100MG TAB 07/19/2022 90 90 Units Alexi Unger, MD [facility] ...   QUEtiapine Fumarate 200MG TAB 06/03/2022 90 90 Units Alexi Unger, MD [facility] ...   QUEtiapine Fumarate 200MG TAB 02/27/2022 90 90 Units Alexi Unger, MD [facility] ...   QUEtiapine Fumarate 200MG TAB 11/30/2021 90 90 Units Alexi Unger, MD [facility] ...   QUEtiapine Fumarate 200MG TAB 08/13/2021 90 90 Units Alexi Unger, MD [facility] ...        Simvastatin     Dispensed Days Supply Quantity Provider Pharmacy   SIMVASTATIN 20MG  TAB 06/03/2022 90 90 Units Alexi Unger, MD [facility] ...   SIMVASTATIN 20MG  TAB 02/27/2022 90 90 Units Alexi Unger, MD [facility] ...   SIMVASTATIN 20MG  TAB 01/05/2022 30 30 Units Alexi Unger, MD [facility]37 ...        levETIRAcetam     Dispensed Days Supply Quantity Provider Pharmacy   LevETIRAcetam 500MG TAB 07/13/2022 90 180 Units Alexi Unger, MD [facility] ...   LevETIRAcetam 500MG TAB 05/06/2022 90 180 Units Alexi Unger, MD [facility] ...   LevETIRAcetam 500MG TAB 02/04/2022 90 180 Units Alexi Unger, MD [facility] ...   LevETIRAcetam 500MG TAB 10/27/2021 90 180 Units Alexi Unger, MD [facility] ...        predniSONE     Dispensed Days Supply Quantity Provider Pharmacy   PREDNISONE 20MG     TAB 07/06/2022 4 5 Units Dillon Monroe MD [facility]49 ...        prednisoLONE Acetate     Dispensed Days Supply Quantity Provider Pharmacy   PREDNISOLONE ACETATE 1% KEVIN 12/10/2021 48 10 mL Mani Summers MD [facility]37 ...        tiZANidine HCl     Dispensed Days Supply Quantity Provider Pharmacy   TiZANidine 2MG      TAB 09/23/2021 5 15 Units Bailey Spear NP [facility] ...         Disclaimer    Certain dispenses may not be available or accurate in this report, including over-the-counter medications, low cost prescriptions, prescriptions paid for by the patient or non-participating sources, or errors in insurance claims information. The provider should independently verify medication history with the patient.    External Sources

## 2022-07-25 NOTE — PLAN OF CARE
[facility] Inpatient Admission Note:    Patient admitted to 3216/3216-1 at approximately 1350 via cart accompanied by transport tech from emergency room . Report received from Stu BIANCHI in SBAR format at 1330 via telephone. Patient transferred to bed via slide board.. Patient is alert and oriented X 2, denies pain; rates at 0 on 0-10 scale.  Patient oriented to room, unit, hourly rounding, and plan of care. Explained admission packet and patient handbook with patient bill of rights brochure. Will continue to monitor and document as needed.     Inpatient Nursing criteria listed below was met:    Health care directives status obtained and documented: No    Patient identifies a surrogate decision maker: Yes If yes, who:Daughter Lashell Contact Information:see facesheet      If initial lactic acid greater than 2.0, repeat lactic acid drawn within one hour of arrival to unit: NA. If no, state reason: Lactic drawn x3 in ED trending down     Clergy visit ordered if patient requests: N/A    Skin issues/needs documented: Yes    Isolation Patient: no Education given, correct sign in place and documentation row added to PCS:  No    Fall Prevention Yes: Care plan updated, education given and documented, sticker and magnet in place: No    Care Plan initiated: Yes    Education Documented (including assessment): Yes    Patient has discharge needs : Yes If yes, please explain:Possible SNIF placement depending on functional ability

## 2022-07-25 NOTE — PLAN OF CARE
Cuco mentation and LOC are labile since admission. He will go from A&O to lethargic and confused and then back to A&O. He is very forgetful. Vitals remain stable. LS dim throughout. Frequent cough- nonproductive. Will continue to monitor patient's hematuria. Provider aware of hemoglobin. Patient states that if a blood transfusion is needed he will not take one unless sometimes talks to Dr. Sarah first. His daughter states he had a severe  reaction the last time he was transfused. Rocephin and Doxycycline IV. Patient was not given PO meds because his daughter stated he took them this AM. The patient's skin is very pale, dusky, and mottled especially when extremities are lowered. Various scabs, bruises, abrasions across patient's body.

## 2022-07-25 NOTE — PLAN OF CARE
The patient was straight cathed in the ED for a urine and is now having hematuria with clots. Provider updated. Will continue to monitor.

## 2022-07-25 NOTE — ED NOTES
DATE:  7/25/2022   TIME OF RECEIPT FROM LAB:  1101  LAB TEST:  Lactic  LAB VALUE:  5.5  RESULTS GIVEN WITH READ-BACK TO (PROVIDER):  Jose Simental MD  TIME LAB VALUE REPORTED TO PROVIDER:   1100

## 2022-07-25 NOTE — ED PROVIDER NOTES
History     Chief Complaint   Patient presents with     Cough     Generalized Weakness     HPI  Cuco Cooper is a 67 year old male with history of sepsis, seizure disorder, pneumonia, CLL, diabetes, hemolytic anemia who presents to the emergency department today from the eye clinic where he was going to have a surgery when he arrived he appeared too ill for the surgery so they sent him here.  According to family as the patient is not really able to provide much of history, he has not been doing well over the last week.  They were not sure whether he was positive or negative for COVID when they took a home test, but is not been eating and has been talking funny for some time.  No other complaints.    Allergies:  Allergies   Allergen Reactions     Blood Transfusion Related (Informational Only) Other (See Comments)     Patient has a history of a warm autoantibody.  A delay in compatible RBCs may occur.     Sulfa Drugs        Problem List:    Patient Active Problem List    Diagnosis Date Noted     Seizure disorder (H) 10/29/2015     Priority: High     Class: Acute     Pneumonia, community acquired 10/29/2015     Priority: High     Class: Acute     Sepsis, due to unspecified organism, unspecified whether acute organ dysfunction present (H) 07/25/2022     Priority: Medium     Acute cystitis without hematuria 07/25/2022     Priority: Medium     Anemia 12/13/2020     Priority: Medium     CLL (chronic lymphocytic leukemia) (H) 12/13/2020     Priority: Medium     Community acquired bacterial pneumonia 12/13/2020     Priority: Medium     At high risk for infection due to chemotherapy 12/13/2020     Priority: Medium     Diabetes mellitus, type II (H) 10/29/2015     Priority: Medium     Elevated brain natriuretic peptide (BNP) level 10/29/2015     Priority: Medium     Hemolytic anemia associated with lymphoproliferative disorder (H) 10/19/2015     Priority: Medium     Anxiety 11/01/2010     Priority: Medium     Cognitive  dysfunction 11/01/2010     Priority: Medium     Overview:   IMO Update 10/11       Bipolar disorder, unspecified (H) 10/28/2004     Priority: Medium     Updated per 10/1/17 IMO import          Past Medical History:    Past Medical History:   Diagnosis Date     Asthma      Bipolar affective disorder (H)      Cannabis abuse      Chronic lymphatic leukemia (H)      DM (diabetes mellitus) (H)      Dyslipidemia      Hematemesis      HTN (hypertension)      Seizure disorder (H)      Seizures (H)      Status epilepticus (H)      TBI (traumatic brain injury) (H)        Past Surgical History:    Past Surgical History:   Procedure Laterality Date     FOREARM SURGERY      Left     TONSILLECTOMY       ZZC REPAIR SHOULDER CAPSULE,ANTERIOR      Left     ZZC TOTAL HIP ARTHROPLASTY      Right       Family History:    Family History   Problem Relation Age of Onset     Other Cancer Mother        Social History:  Marital Status:   [4]  Social History     Tobacco Use     Smoking status: Current Every Day Smoker     Packs/day: 1.00     Types: Cigarettes     Smokeless tobacco: Never Used   Substance Use Topics     Alcohol use: No     Alcohol/week: 0.0 standard drinks     Drug use: Yes     Types: Marijuana     Comment: marijuana        Medications:    No current outpatient medications on file.        Review of Systems  Patient slumped over not really answering questions well  Physical Exam   BP: (!) 73/50  Pulse: 114  Temp: 98.2  F (36.8  C)  Resp: 24  Height: 182.9 cm (6')  Weight: 61.2 kg (135 lb)  SpO2: 96 %      Physical Exam  Constitutional: Somnolent and ill-appearing  HENT: NCAT   Eyes: Normal pupils   Neck: supple   CV: Tachycardic rate, regular rhythm, no murmur   Pulmonary/Chest: Non-labored respirations, clear to auscultation bilaterally   Abdominal: Soft, non-tender, non-distended   MSK: MACIAS.   Neuro: Somnolent but arousable, will follow commands when you wake him up  Skin: Warm and dry. No diaphoresis.  Numerous dark  spots all over the skin  Psych: Appropriate mood and affect     ED Course              ED Course as of 07/25/22 1705   Mon Jul 25, 2022   1144 67-year-old male presenting here from clinic with concern for hypotension and weakness.  On arrival, patient barely able to keep his eyes open, initial blood pressure 73/50 rechecked in the room and consistently in the 90s over 60s.  We did get IV access and start fluids and his heart rate came down from the 1 teens to the 90s which is an improvement.  His oxygen's been fine throughout, but he is coughing.  Influenza and COVID studies are negative.  Laboratory evaluation concerning for some sort of infection with an elevated lactate of 5.0 a procalcitonin of 1.17, CRP of 180 and a white count of 18.6.  He does have a history of CLL, the white count is not necessarily easy to interpret but in the context of the remaining information this is most likely an infectious etiology.  Chest x-ray without very clear findings but in a dehydrated patient you can certainly miss pneumonias, will get a CT of his chest.  Urine is pending.   1147 Urine is back he has UTI this is pyelonephritis with sepsis, will admit.     Procedures            Results for orders placed or performed during the hospital encounter of 07/25/22 (from the past 24 hour(s))   CBC with platelets differential    Narrative    The following orders were created for panel order CBC with platelets differential.  Procedure                               Abnormality         Status                     ---------                               -----------         ------                     CBC with platelets and d...[[id number]]  Abnormal            Final result               Manual Differential[[id number]]          Abnormal            Final result                 Please view results for these tests on the individual orders.   Comprehensive metabolic panel   Result Value Ref Range    Sodium 131 (L) 133 - 144 mmol/L    Potassium 4.3  3.4 - 5.3 mmol/L    Chloride 98 94 - 109 mmol/L    Carbon Dioxide (CO2) 23 20 - 32 mmol/L    Anion Gap 10 3 - 14 mmol/L    Urea Nitrogen 39 (H) 7 - 30 mg/dL    Creatinine 1.35 (H) 0.66 - 1.25 mg/dL    Calcium 8.3 (L) 8.5 - 10.1 mg/dL    Glucose 199 (H) 70 - 99 mg/dL    Alkaline Phosphatase 192 (H) 40 - 150 U/L    AST 23 0 - 45 U/L    ALT 9 0 - 70 U/L    Protein Total 6.4 (L) 6.8 - 8.8 g/dL    Albumin 1.6 (L) 3.4 - 5.0 g/dL    Bilirubin Total 1.0 0.2 - 1.3 mg/dL    GFR Estimate 57 (L) >60 mL/min/1.73m2   Lipase   Result Value Ref Range    Lipase 25 (L) 73 - 393 U/L   Troponin I   Result Value Ref Range    Troponin I High Sensitivity 34 <79 ng/L   Blood gas venous and oxyhgb   Result Value Ref Range    pH Venous 7.47 (H) 7.32 - 7.43    pCO2 Venous 31 (L) 40 - 50 mm Hg    pO2 Venous 98 (H) 25 - 47 mm Hg    Bicarbonate Venous 22 21 - 28 mmol/L    FIO2 21     Oxyhemoglobin Venous 92 (H) 70 - 75 %    Base Excess/Deficit (+/-) -1.1 -7.7 - 1.9 mmol/L   TSH with free T4 reflex   Result Value Ref Range    TSH 3.06 0.40 - 4.00 mU/L   Ethyl Alcohol Level   Result Value Ref Range    Alcohol ethyl <0.01 <=0.01 g/dL   INR   Result Value Ref Range    INR 1.32 (H) 0.85 - 1.15   Lactic acid whole blood   Result Value Ref Range    Lactic Acid 5.0 (HH) 0.7 - 2.0 mmol/L   ABO/Rh type and screen    Narrative    The following orders were created for panel order ABO/Rh type and screen.  Procedure                               Abnormality         Status                     ---------                               -----------         ------                     Adult Type and Screen[[id number]]                            Final result                 Please view results for these tests on the individual orders.   CBC with platelets and differential   Result Value Ref Range    WBC Count 18.6 (H) 4.0 - 11.0 10e3/uL    RBC Count 2.51 (L) 4.40 - 5.90 10e6/uL    Hemoglobin 7.9 (L) 13.3 - 17.7 g/dL    Hematocrit 25.3 (L) 40.0 - 53.0 %     (H)  78 - 100 fL    MCH 31.5 26.5 - 33.0 pg    MCHC 31.2 (L) 31.5 - 36.5 g/dL    RDW 16.0 (H) 10.0 - 15.0 %    Platelet Count 162 150 - 450 10e3/uL   Adult Type and Screen   Result Value Ref Range    ABO/RH(D) O POS     Antibody Screen Negative Negative    SPECIMEN EXPIRATION DATE [id number]    Manual Differential   Result Value Ref Range    % Neutrophils 49 %    % Lymphocytes 49 %    % Monocytes 2 %    % Eosinophils 0 %    % Basophils 0 %    Absolute Neutrophils 9.1 (H) 1.6 - 8.3 10e3/uL    Absolute Lymphocytes 9.1 (H) 0.8 - 5.3 10e3/uL    Absolute Monocytes 0.4 0.0 - 1.3 10e3/uL    Absolute Eosinophils 0.0 0.0 - 0.7 10e3/uL    Absolute Basophils 0.0 0.0 - 0.2 10e3/uL    RBC Morphology Confirmed RBC Indices     Platelet Assessment  Automated Count Confirmed. Platelet morphology is normal.     Automated Count Confirmed. Platelet morphology is normal.   CRP inflammation   Result Value Ref Range    CRP Inflammation 180.0 (H) 0.0 - 8.0 mg/L   Procalcitonin   Result Value Ref Range    Procalcitonin 1.17 (H) <0.05 ng/mL   Asymptomatic Influenza A/B & SARS-CoV2 (COVID-19) Virus PCR Multiplex Nasopharyngeal    Specimen: Nasopharyngeal; Swab   Result Value Ref Range    Influenza A PCR Negative Negative    Influenza B PCR Negative Negative    RSV PCR Negative Negative    SARS CoV2 PCR Negative Negative    Narrative    Testing was performed using the Xpert Xpress CoV2/Flu/RSV Assay on the [employer] GeneXpert Instrument. This test should be ordered for the detection of SARS-CoV-2 and influenza viruses in individuals who meet clinical and/or epidemiological criteria. Test performance is unknown in asymptomatic patients. This test is for in vitro diagnostic use under the FDA EUA for laboratories certified under CLIA to perform high or moderate complexity testing. This test has not been FDA cleared or approved. A negative result does not rule out the presence of PCR inhibitors in the specimen or target RNA in concentration below the  limit of detection for the assay. If only one viral target is positive but coinfection with multiple targets is suspected, the sample should be re-tested with another FDA cleared, approved, or authorized test, if coinfection would change clinical management. This test was validated by the [facility]. These laboratories are certified under the Clinical  Laboratory Improvement Amendments of 1988 (CLIA-88) as qualified to perform high complexity laboratory testing.   XR Chest Port 1 View    Narrative    PROCEDURE:  XR CHEST PORT 1 VIEW    HISTORY:  Dyana evans.     COMPARISON:  July 5, 2001    FINDINGS:   The cardiac silhouette is normal in size. The pulmonary vasculature is  normal.  There is mild interstitial thickening in both lungs stable  from previous examination No pleural effusion or pneumothorax.      Impression    IMPRESSION:  Stable appearance of the chest from July 5, 2022      CECIL CASTRO MD         SYSTEM ID:  [id number]   Lactic acid whole blood   Result Value Ref Range    Lactic Acid 3.4 (H) 0.7 - 2.0 mmol/L   Extra Tube    Narrative    The following orders were created for panel order Extra Tube.  Procedure                               Abnormality         Status                     ---------                               -----------         ------                     Extra Purple Top Tube[[id number]]                            Final result               Extra Green Top (Lithium...[[id number]]                      Final result                 Please view results for these tests on the individual orders.   Extra Purple Top Tube   Result Value Ref Range    Hold Specimen JIC    Extra Green Top (Lithium Heparin) ON ICE   Result Value Ref Range    Hold Specimen JIC    UA with Microscopic reflex to Culture    Specimen: Urine, Catheter   Result Value Ref Range    Color Urine Yellow Colorless, Straw, Light Yellow, Yellow    Appearance Urine Slightly Cloudy (A) Clear    Glucose Urine  Negative Negative mg/dL    Bilirubin Urine Negative Negative    Ketones Urine Negative Negative mg/dL    Specific Gravity Urine 1.013 1.003 - 1.035    Blood Urine Small (A) Negative    pH Urine 5.5 4.7 - 8.0    Protein Albumin Urine 30  (A) Negative mg/dL    Urobilinogen Urine 2.0 Normal, 2.0 mg/dL    Nitrite Urine Negative Negative    Leukocyte Esterase Urine Large (A) Negative    WBC Clumps Urine Present (A) None Seen /HPF    Mucus Urine Present (A) None Seen /LPF    RBC Urine 3 (H) <=2 /HPF    WBC Urine 60 (H) <=5 /HPF    Squamous Epithelials Urine 0 <=1 /HPF    Hyaline Casts Urine 8 (H) <=2 /LPF    Narrative    Urine Culture ordered based on laboratory criteria   CTA Chest with Contrast    Narrative    CTA CHEST WITH CONTRAST    HISTORY: 67 years Male cancer, HoTN, shortness of breath, malaise.    TECHNIQUE: Axial CT imaging of the chest was performed with  intervenous contrast during arterial phase of enhancement. Multiplanar  reconstructions and 3-D MIP reconstructions were obtained on a 3-D  workstation.    COMPARISON: 12/16/2020    FINDINGS:  There is a small to moderate-sized pericardial effusion. Volume of  pericardial fluid is similar to that seen previously. There has been  interval resolution of a large loculated left pleural effusion.    There are no filling defects in the pulmonary arteries. There is no  evidence of pulmonary embolism. There is a peripherally calcified  structure below the arch of the aorta, above the left pulmonary artery  that does not  opacify with contrast.  Numerous mildly enlarged mediastinal lymph nodes are present, similar  to those seen previously.    There is been interval development of numerous pulmonary nodules or  nodular opacities. Ranging in size from a few millimeters to 1.2 cm in  diameter. The majority of these opacities have irregular margins, some  with groundglass halos. There is some peripheral interstitial opacity  as well.         No concerning osseous lesions  are identified    There is slight enlargement of the left adrenal gland with some  adjacent fat stranding. The adrenal measures 4.0 x 1.6 cm. Previously  it measured 3.5 x 1.2 cm.      Impression    IMPRESSION: No evidence of pulmonary embolism.    Moderate sized pericardial effusion. A similar volume of pericardial  fluid was present on the prior study. There is been interval  resolution of a large loculated left pleural effusion or    Interval of development of numerous multifocal nodular opacities  throughout both lungs. Differential would include an atypical  infectious/inflammatory process or metastatic disease. There is  unchanged borderline mediastinal lymphadenopathy. There is slight  enlargement of the left adrenal gland.    Interstitial opacities suggestive of pulmonary edema.    EVIE SALAZAR MD         SYSTEM ID:  [id number]   Ammonia   Result Value Ref Range    Ammonia <10 (L) 10 - 50 umol/L   Lactic acid whole blood   Result Value Ref Range    Lactic Acid 2.5 (H) 0.7 - 2.0 mmol/L   Hemoglobin   Result Value Ref Range    Hemoglobin 7.0 (L) 13.3 - 17.7 g/dL       Medications   albuterol (PROVENTIL) neb solution 2.5 mg (has no administration in time range)   aspirin EC tablet 81 mg (0 mg Oral Hold 7/25/22 1604)   citalopram (celeXA) tablet 40 mg (has no administration in time range)   divalproex sodium extended-release (DEPAKOTE ER) 24 hr tablet 1,000 mg (has no administration in time range)   ibrutinib (IMBRUVICA) tablet 420 mg (0 mg Oral Hold 7/25/22 1623)   levETIRAcetam (KEPPRA) tablet 500 mg (has no administration in time range)   QUEtiapine (SEROquel) tablet 100 mg (0 mg Oral Hold 7/25/22 1604)   lidocaine 1 % 0.1-1 mL (has no administration in time range)   lidocaine (LMX4) cream (has no administration in time range)   sodium chloride (PF) 0.9% PF flush 3 mL (3 mLs Intracatheter Not Given 7/25/22 1506)   sodium chloride (PF) 0.9% PF flush 3 mL (has no administration in time range)   melatonin  tablet 1 mg (has no administration in time range)   glucose gel 15-30 g (has no administration in time range)     Or   dextrose 50 % injection 25-50 mL (has no administration in time range)     Or   glucagon injection 1 mg (has no administration in time range)   ondansetron (ZOFRAN ODT) ODT tab 4 mg (has no administration in time range)     Or   ondansetron (ZOFRAN) injection 4 mg (has no administration in time range)   insulin aspart (NovoLOG) injection (RAPID ACTING) (has no administration in time range)   insulin aspart (NovoLOG) injection (RAPID ACTING) (has no administration in time range)   cefTRIAXone in d5w (ROCEPHIN) intermittent infusion 1 g (1 g Intravenous New Bag 7/25/22 1546)   doxycycline (VIBRAMYCIN) 100 mg in sodium chloride 0.9 % 100 mL intermittent infusion (100 mg Intravenous New Bag 7/25/22 1626)   0.9% sodium chloride BOLUS (0 mLs Intravenous Stopped 7/25/22 1142)   ceFEPIme (MAXIPIME) 2-5 GM-%(50ML) intermittent infusion 2 g (2 g Intravenous Given 7/25/22 1135)   iopamidol (ISOVUE-370) solution 55 mL (55 mLs Intravenous Given 7/25/22 1148)   sodium chloride (PF) 0.9% PF flush 100 mL (50 mLs Intravenous Given 7/25/22 1147)   0.9% sodium chloride BOLUS (1,000 mLs Intravenous New Bag 7/25/22 1143)       Assessments & Plan (with Medical Decision Making)     I have reviewed the nursing notes.    I have reviewed the findings, diagnosis, plan and need for follow up with the patient.      Current Discharge Medication List          Final diagnoses:   Sepsis, due to unspecified organism, unspecified whether acute organ dysfunction present (H)       7/25/2022   [facility]     Jose Simental MD  07/25/22 8588

## 2022-07-25 NOTE — ED TRIAGE NOTES
"Patient presents with daughter; states that patient was suppose to have a procedure today but wasn't feeling well.  Reports that he had bronchitis a few weeks ago and has never really gotten better. Had an at home covid test that was \"inconclusive\"  Patient's daughter reports that he has not been eating, not eating and talking \"funny\" for weeks now.  Patient slumped over in chair.      "

## 2022-07-25 NOTE — ED NOTES
Patient is admitting general skin assessment shows abrasions, hematomas, general bruising. all over back and lower extremities

## 2022-07-25 NOTE — PLAN OF CARE
Face to face report given with opportunity to observe patient.    Report given to Zoila Lim RN   7/25/2022  6:59 PM

## 2022-07-25 NOTE — PHARMACY-VANCOMYCIN DOSING SERVICE
Pharmacy received consult to dose vancomycin for ED for one time dosing. Indication(s): Sepsis. Dosed at 20 mg/kg using weight of 61.2 kg which gave a calculated dose of 1250 mg. Please order another pharmacy to dose vancomycin consult if admitted and vancomycin is needed. Thank you.    Carla Gan [facility]

## 2022-07-25 NOTE — DISCHARGE INSTRUCTIONS

## 2022-07-26 NOTE — PROGRESS NOTES
[facility]    Hospitalist Progress Note      Assessment & Plan   Cuco Cooper is a 67 year old male who was admitted on 7/25/2022.    The patient is a 67-year-old gentleman who has a significant history of chronic lymphocytic leukemia, small lymphocytic lymphoma.  This has been present since at least 2012.  He has currently been on ibrutinib.  He has history of diabetes, seizure disorder, underlying COPD also.  He apparently presented to the ER back on 07/05/2022 with some complaints of generalized weakness then, he did have a cough.  No real shortness of breath at all.  Workup at that time showed that he had normal room air sats, was hemodynamically stable, afebrile.  Labs were significant.  CRP was up at 90.2.  White count 14,000.  COVID is negative.  Urine was clear.  Chest x-ray showed questionable multifocal pneumonia.  He was started on a Z-Car and given a short course of prednisone.  Apparently, he was brought back to the ER today as he was supposed to have an eye procedure done; however, he just felt poor.  He has not been eating or drinking well, has been more lethargic, has worsening cough, really seemingly has not gotten better since that trip to the ER back on 07/05.  He did see Dr. Unger for a preop on 07/19/2022.  Not much is said about his recent issues.  His sats were 95% on room air, pressure was 114/62 and he was basically cleared for surgery.  He came in today, brought by his daughter just because he was not feeling well at all, and was brought to the ER for evaluation.  His vital signs when he showed up in the ER were temperature of 98.2, blood pressure was 73/50, pulse 114.  Saturations are 96% on room air.  He was given some IV fluids.  He had a lot of workup done in the ER, which showed CRP was up to 180.  Ammonia was negative.  Procalcitonin is 1.17.  Lactic acid was 2.5.  White count was 18,000, hemoglobin was 7.  Urinalysis did have a 60 white cells and 3 red cells, white  blood cell clumps, bacteria were noted.  His COVID was negative.  Alcohol was negative.  CT scan of his chest, which showed multifocal nodular opacities throughout both lungs.  Whether it is an atypical infectious/inflammatory process or metastatic disease is unclear.  He had blood cultures done.  Urine culture was done.  He was given a dose of cefepime based on his urinalysis.  The plan was to be admitted here to our hospital for further evaluation and workup.     As I am seeing the patient down in the ER, he was very lethargic.  Difficult to understand him, but he did answer questions seemingly appropriately.  He just feels very fatigued and cold.  He has had the cough.  No real shortness of breath or chest pain.  Not much of an appetite.  No nausea, vomiting.  No diarrhea, no abdominal pain.  Normally, he is able to get up and move around on his own pretty well, just feels pretty wiped out, however.  He does have a very congested sounding cough currently.  His most recent vital signs were temperature of 96.6, blood pressure 105/67, pulse is 87, sats are 95% on room air.  Looking at the notes, I believe he got roughly 2 liters of saline IV, did get 1 dose of Maxipime, cefepime 2 grams in the ER also.    1.  Presumed community-acquired pneumonia: Patient does have a lot of upper airway rhonchi seemingly better today.  CT did show some scattered nodular findings.  Did have an elevated procalcitonin level mildly so.  He continues to be on room air.  Treating him currently with Rocephin and doxycycline.  Unable to get a sputum sample.  Still with a fever today up to 101.  Oxygenation is okay still procalcitonin did improve down to 0.87.  For now continue with her current regimen.    2.  Question acute cystitis without hematuria: There is a fair amount of white cells.  So far his urine culture is negative for growth.  At this point presuming the Rocephin will cover things for now.  Unless cultures grow something  differently.    3.  Anemia: Patient presented with hemoglobin in the 7 range.  Looking back in May his hemoglobin actually was in the 12 range.  Has drifted down it was 7.9 on July 19 as he was having a preop eval for some potential eye surgery.  It has drifted down to 6.5 today.  No obvious bleeding that we are aware of for that he admits although he is not the greatest historian.  Would like to give him a transfusion.  The patient is very adamant and need to speak with his hematologist Dr. Sarah regarding transfusion as he did have a reaction previously.  Did have history of warm antibodies.  Currently IVAN is negative.  No bleeding that we can see anywhere.  He has had a history of hemolytic anemia in the past.  LDH is normal.  Haptoglobin is pending.  We will await Dr. Sarah's return call but would anticipate transfusing him unless he has some other thoughts.    4.  Relative hypotension: Patient was pressure usually 110.  He has been running in the 90 systolic range she has been arterial pressures greater than 65 however.  Has been voiding although incontinent.  Did give another fluid bolus this morning 500 cc.  Continue to watch him closely.  His lactic acid was 1.3 this morning.    5.  CLL: White count was 17,000 today platelet count is 146,000.  He does take his ibrutinib.    6.  Diabetes mellitus type 2: Patient is on Actos only.  Will cover with sliding scale currently.    7.  Lethargy.  Patient is very slow to answer questions.  I am not sure what his baseline is.  I anticipate is better than what he is now.  Is appropriate in his answers and such but very slow lethargic.          Diet: Combination Diet Regular Diet Adult  Fluids: Saline 100 cc an hour    DVT Prophylaxis: Pneumatic Compression Devices  Code Status: No CPR- Do NOT Intubate    Disposition: Expected discharge in 2-5 days once afebrile hemodynamically stable.    Anselmo Gonzalez MD    Interval History   Patient's morning continues to be a  little lethargic.  Do wake him up and he answers questions although very slowly.  Fever today of 101.  Pressure still right around 90 systolic range.  Has been incontinent of urine on several occasions.  Cultures all negative to this point.  Remains on Rocephin and doxycycline.  Lungs do sound a bit clear today.  He continues on room air hemoglobin is down to 6.5.  He wishes to have he discuss it with Dr. Sarah and have a phone call into him about transfusion.  No antibodies noted on his current screen.  His he has dropped significantly since back in May he was in the 12 range when he saw Dr. Sarah and now down to 6.5.  The patient denies any evidence of bleeding at all he had a little bit of hematuria on admission after they straight cath him for a urine.  I will get his opinion regarding this I do not think there is any evidence of hemolysis currently.    -Data reviewed today: I reviewed all new labs and imaging results over the last 24 hours. I personally reviewed no images or EKG's today.    Physical Exam   Temp: 99.6  F (37.6  C) Temp src: Tympanic BP: 92/55 Pulse: 95   Resp: 20 SpO2: (!) 91 % O2 Device: None (Room air)    Vitals:    07/25/22 1124 07/25/22 1352 07/26/22 0500   Weight: 61.2 kg (135 lb) 72.4 kg (159 lb 9.8 oz) 72.1 kg (158 lb 15.2 oz)     Vital Signs with Ranges  Temp:  [96.6  F (35.9  C)-101.9  F (38.8  C)] 99.6  F (37.6  C)  Pulse:  [] 95  Resp:  [18-23] 20  BP: ()/(48-67) 92/55  SpO2:  [87 %-97 %] 91 %  I/O last 3 completed shifts:  In: 353 [P.O.:240; I.V.:113]  Out: 25 [Urine:25]    Peripheral IV 07/26/22 Anterior;Left Lower forearm (Active)   Site Assessment WDL 07/26/22 1127   Line Status Infusing 07/26/22 1127   Phlebitis Scale 0-->no symptoms 07/26/22 1127   Infiltration Scale 0 07/26/22 1127   Number of days: 0       Wound Other (Comment) Other (comment) (Active)   Number of days: 587     No line/device    Constitutional: Patient is a bit lethargic.  Does open eyes and  answers questions seemingly appropriate.  No real distress  HEENT: Normocephalic/atraumatic, PERRL, EOMI, mouth  Dry , neck supple, no LN.     Cardiovascular: RRR.  No murmur, no  rubs, or gallops.  JVD appears flat.  Bruits no.  Pulses 2+    Respiratory: Clear today decreased scattered rhonchi less than yesterday rare expiratory wheeze.  Clear to auscultation bilaterally    Abdomen: Soft, nontender, nondistended, no organomegaly. Bowel sounds present    Extremities: Warm/dry. No edema    Neuro:   Non focal, cranial nerves intact, Moves all extremities.  Medications       aspirin  81 mg Oral Daily     cefTRIAXone  1 g Intravenous Q24H     citalopram  40 mg Oral Daily     divalproex sodium extended-release  1,000 mg Oral BID     doxycycline (VIBRAMYCIN) IV  100 mg Intravenous Q12H     ibrutinib  420 mg Oral Daily     insulin aspart  1-7 Units Subcutaneous TID AC     insulin aspart  1-5 Units Subcutaneous At Bedtime     levETIRAcetam  500 mg Oral BID     QUEtiapine  100 mg Oral Daily     sodium chloride (PF)  3 mL Intracatheter Q8H       Data   Recent Labs   Lab 07/26/22  0645 07/26/22  0556 07/26/22  0555 07/26/22  0321 07/25/22  1721 07/25/22  1342 07/25/22  1013   WBC  --  17.3* 17.3*  --   --   --  18.6*   HGB  --  6.5* 6.5*  --   --  7.0* 7.9*   MCV  --  100 100  --   --   --  101*   PLT  --  146* 148*  --   --   --  162   INR  --   --   --   --   --   --  1.32*   NA  --  137  --   --   --   --  131*   POTASSIUM  --  4.0  --   --   --   --  4.3   CHLORIDE  --  106  --   --   --   --  98   CO2  --  26  --   --   --   --  23   BUN  --  38*  --   --   --   --  39*   CR  --  1.08  --   --   --   --  1.35*   ANIONGAP  --  5  --   --   --   --  10   IVON  --  7.8*  --   --   --   --  8.3*   * 117*  --  122*   < >  --  199*   ALBUMIN  --  1.2*  --   --   --   --  1.6*   PROTTOTAL  --  5.0*  --   --   --   --  6.4*   BILITOTAL  --  0.6  --   --   --   --  1.0   ALKPHOS  --  131  --   --   --   --  192*   ALT  --  7   --   --   --   --  9   AST  --  17  --   --   --   --  23   LIPASE  --   --   --   --   --   --  25*    < > = values in this interval not displayed.       No results found for this or any previous visit (from the past 24 hour(s)).

## 2022-07-26 NOTE — PLAN OF CARE
Goal Outcome Evaluation:  Reason for hospital stay:  Sepsis  Living situation PTA: Home with daughter/son-in-law?  Most recent vitals: BP (!) 92/48 (BP Location: Right arm, Patient Position: Left side, Cuff Size: Adult Regular)   Pulse 96   Temp (!) 101.9  F (38.8  C) (Tympanic)   Resp 18   Ht 1.829 m (6')   Wt 72.1 kg (158 lb 15.2 oz)   SpO2 92%   BMI 21.56 kg/m      Comments:  Fever 101.9, Soft BP's (see flowsheets), tachycardic @ times.  Labile, confused, lethargic,slow to respond, but cooperative.  Denied pain last night, this AM no response to questions on pain, grimace and moaning during cares.Wet, congested cough, with secretion (patient swallows), Garbled speech, diminished lowerr lobes of the lungs, remains on RA with O2 sats dipping slightly and rebounding with cough deep breathing.    Patient incontinent of dark leatha/red colored urine w/clots (provider aware).  Mucous membranse remain soft. Abx (see mar) IV over night.    No difficulty swallowing medications.     Face to face report given with opportunity to observe patient.    Report given to Renetta Ramirez RN   7/26/2022  7:15 AM

## 2022-07-26 NOTE — CARE PLAN
Reviewed PTA medications with daughter Lashell.     Per Lashell, Gagan (unknown relation) [phone] lives with patient full time and reminds him to take his medications. Unable to get through to Gagan at this time.    Per Lashell, she thinks patient took all of his medications yesterday morning prior to coming in. She confirmed all meds listed.    Changes made:     Thinks pt takes seroquel at HS. She confirmed recent decrease in dose.    Removed zanaflex, believes pt completed therapy and no longer has in home.       Bhavna Jacobs on 7/26/2022 at 1:08 PM

## 2022-07-26 NOTE — PROGRESS NOTES
Left VM for daughter Lashell to return my call. Was not able to complete Medicare Letter with Cuco or complete an assessment.    Mary Orr CM

## 2022-07-26 NOTE — PROGRESS NOTES
07/26/22 1100   Appointment Canceled   Appointment Canceled Medical status;Hold (see Cancel Comments row)   Cancel Comments Okay to hold PT evaluation today per Dr. Gonzalez. Will attempt tomorrow if appropriate   Signing Clinician's Name / Credentials   Signing clinician's name / credentials Heidi Jimenez DPT   Quick Adds   Rehab Discipline PT

## 2022-07-26 NOTE — PLAN OF CARE
Reason for hospital stay: Sepsis   Living situation PTA: Home with family   Most recent vitals: BP (!) 89/50 (BP Location: Right arm, Patient Position: Supine, Cuff Size: Adult Regular)   Pulse 88   Temp 98.3  F (36.8  C) (Tympanic)   Resp 19   Ht 1.829 m (6')   Wt 72.1 kg (158 lb 15.2 oz)   SpO2 94%   BMI 21.56 kg/m      Pain Management: Denies pain on this shift   LOC:  Lethargic, arouses to voice and gentle touch   Cardiac: 80-90's over 50's. Has received 2 500 ml bolus - pressures remain unchanged   Respiratory: Lungs remain clear, low to mid 90's on RA  GI: Not awake enough to safely swallow - did drink half carton of milk and some water on this shift.   : Incontinent of urine   Skin Issues: Scattered bruising/scabs     IVF:  100mL/HR continuous   ABX:  Rocephin and doxycycline     Nutrition: regular diet   ADL's: Bedbath, linens changed   Ambulation: Bedrest   Safety: Call light in reach, bed in low position and pt remained free from injury on this shift     Glucose was 143 and 120 - no insulin coverage given       Face to face report given with opportunity to observe patient.    Report given to Kevon Montesinos RN   7/26/2022  3:48 PM
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
## 2022-07-26 NOTE — PHARMACY-MEDICATION REGIMEN REVIEW
Pharmacy Antimicrobial Stewardship Assessment     Current Antimicrobial Therapy:  Anti-infectives (From now, onward)    Start     Dose/Rate Route Frequency Ordered Stop    22 1600  cefTRIAXone in d5w (ROCEPHIN) intermittent infusion 1 g         1 g  over 30 Minutes Intravenous EVERY 24 HOURS 22 1503      22 1600  doxycycline (VIBRAMYCIN) 100 mg in sodium chloride 0.9 % 100 mL intermittent infusion         100 mg  over 1-2 Hours Intravenous EVERY 12 HOURS 22 1503            Indication: CAP, UTI    Days of Therapy: 2     Pertinent Labs:  Recent Labs   Lab Test 22  0556 22  0555 22  1013   WBC 17.3* 17.3* 18.6*     Lab Test 22  0556 22  1320 22  1110 22  1013 22  0150   LACT  --  2.5* 3.4* 5.0* 1.9   .0*  --   --  180.0* 90.2*   SED  --   --   --   --   --    PCAL 0.87*  --   --  1.17*  --         Temperature:  Temp (24hrs), Av.3  F (37.4  C), Min:96.6  F (35.9  C), Max:101.9  F (38.8  C)    Culture Results:       Recommendations/Interventions:  1. No recommendations are required at the moment, will continue to monitor.    Neo Torres, Pharmacy Intern  2022

## 2022-07-26 NOTE — PROGRESS NOTES
Assessment completed with daughter Lashell, patient has been sleeping.    LOC: sleeping    Dx: sepsis  Chronic Disease Management: seizure disorder, PNA, anemia, DM2, lymphoproliferative disorder, anxiety, cognitive disorder, bipolar disorder    Lives with: daughters ex Gagan  Living at:  Home in [place]  Transportation: YES, daughter Lashell and Gagan provide    Primary PCP: Alexi Unger  Insurance:  Medicare and BCBS  Medicare: Inpatient letter reviewed with Lashell.    Support System:  family  Homecare/PCA: no  /County Services:   no  [place]: NO      How was the [facility] notification completed: n/a    Health Care Directive: no  Guardian: no  POA: no    Pharmacy: [facility]  Meds management: Gagan manages    Adequate Resources for needs (housing, utilities, food/med): YES  Household chores: Gagan does  Work/community/social activity: NO     ADLs: needs assistance  Ambulation:uses a cane and 1 person  Falls: has had falls in the last two weeks, bruises very easily  Nutrition: very thin  Sleep: sleeps well    Equipment used: cane      Oxygen supplier: no      Does the supplier have valid oxygen orders: n/a    Mental health: denies  Substance abuse: 1/2 to 1 ppd of cigarettes and marijuana  Exposure to violence/abuse: not asked  Stressors: not asked    Able to Return to Prior Living Arrangements: unknown    Choice of Vendor: n/a    Barriers: unknown    AYO: low    Plan: unknown.    Mary Orr CM

## 2022-07-26 NOTE — PLAN OF CARE
Spoke to pt sister this morning, her  name is Jacki Garcia now, previously Wil[place]te, it is listed as Wick on the face sheet. Provided pt sister with brief update, she would like to be kept in the loop regarding her brothers care.

## 2022-07-26 NOTE — PROGRESS NOTES
07/26/22 1200   Appointment Canceled   Appointment Canceled Medical status;Hold (see Cancel Comments row)   Cancel Comments Okay to hold OT evaluation today, per Dr. Gonzalez. Will reassess tomorrow and complete evaluation if appropriate.   Signing Clinician's Name / Credentials   Signing clinician's name / credentials Marlen Carson OTR/L   Quick Adds   Rehab Discipline OT

## 2022-07-26 NOTE — PHARMACY
[facility]    Pharmacy      Antimicrobial Stewardship Note     Current antimicrobial therapy:  Anti-infectives (From now, onward)    Start     Dose/Rate Route Frequency Ordered Stop    07/25/22 1600  cefTRIAXone in d5w (ROCEPHIN) intermittent infusion 1 g         1 g  over 30 Minutes Intravenous EVERY 24 HOURS 07/25/22 1503      07/25/22 1600  doxycycline (VIBRAMYCIN) 100 mg in sodium chloride 0.9 % 100 mL intermittent infusion         100 mg  over 1-2 Hours Intravenous EVERY 12 HOURS 07/25/22 1503            Indication: CAP, UTI          Culture Results:         Recommendations/Interventions:  1. Await cultures.      Mary Spear [facility]  July 26, 2022

## 2022-07-27 NOTE — PLAN OF CARE
Writer was able to get a set of vitals taken on patient, and checked his BG and he did not qualify for needing sliding scale insulin this am. PT assisted patient to chair with A x 2. Patient was orientated to self and place, but disorientated to time and situation. Patient told writer that he lives at home alone. Writer had to hand off patient shortly after beginning morning assessment, so writer completed what she could.    Face to face report given with opportunity to observe patient.    Report given to Kameron Love RN   7/27/2022  9:36 AM

## 2022-07-27 NOTE — PLAN OF CARE
Writer assumed care of this pt at 0845 this AM. Pt disoriented to time, situation, and place. BP this AM was 98/58, most recent BP at 1430 was 118/68. Pt is lethargic and slow to respond. Pt denied pain during shift. Up to bedside commode with assistance of 2. Meplex dressing placed to back d/t broken scab and bleeding. Pt has 2 IV sites in L arm. L upper forearm saline locked, L lower forearm infusing NS at 100 mL/hr. Daughter at bedside. Call light within reach and alarms in place.     Face to face report given with opportunity to observe patient.    Report given to ARIAS Lobo RN   7/27/2022  3:30 PM

## 2022-07-27 NOTE — PROGRESS NOTES
07/27/22 1100   Quick Adds   Type of Visit Initial Occupational Therapy Evaluation   Living Environment   Living Environment Comments Pt confused and unsure about the reliability of his answers. Pt stated he lived alone in a house but per chart review he lives with his daughter's ex boyfriend. Pt states 4 steps to enter and 4 steps inside?   Self-Care   Equipment Currently Used at Home other (see comments)  (pt reports having grab bars and shower chair in bathroom- unsure if this is accurate or not. he did not answer/respond when asked about the toilet.)   Fall history within last six months yes   Number of times patient has fallen within last six months   (pt did not answer when asked this)   Activity/Exercise/Self-Care Comment Pt reports he was independent with ADLs. He did not respond when asked about an AD.   Instrumental Activities of Daily Living (IADL)   IADL Comments Unknown - pt did not respond when asked   General Information   Onset of Illness/Injury or Date of Surgery 07/25/22   Referring Physician Dr. Gonzalez   Patient/Family Therapy Goal Statement (OT) unable to verbalize a meaningful goal at this time   Additional Occupational Profile Info/Pertinent History of Current Problem Pt admitted with sepsis and acute cystitis.   Existing Precautions/Restrictions fall   Limitations/Impairments safety/cognitive   General Observations and Info Pt sitting up in the chair, talking to OT but appeared to be confused and a poor historian. Will have to obtain info from family to clarify pt's PLOF.   Cognitive Status Examination   Orientation Status person;place   Affect/Mental Status (Cognitive) confused   Follows Commands 25-49% accuracy;delayed response/completion;increased processing time needed;initiation impaired;repetition of directions required;verbal cues/prompting required   Safety Deficit unable/difficult to assess   Memory Deficit severe deficit   Attention Deficit moderate deficit   Executive Function  Deficit information processing;initiation   Visual Perception   Impact of Vision Impairment on Function (Vision) asked pt if he wore glasses and he denied. asked pt to count how many fingers OT was holding up and he did not answer   Pain Assessment   Patient Currently in Pain No   Range of Motion Comprehensive   Comment, General Range of Motion Pt did not follow commands for 25% of ROM assessment. Elbows and fingers WFL   Strength Comprehensive (MMT)   Comment, General Manual Muscle Testing (MMT) Assessment Elbows grossly 4-5/5, B gross grasp fair. Unable to complete MMT on shoulders due to not following commands   Coordination   Coordination Comments pt did not respond to cues to assess   Bed Mobility   Comment (Bed Mobility) pt up in the chair already, he required modA earlier today per chart review   Transfers   Transfer Comments Enio x2   Activities of Daily Living   BADL Assessment/Intervention lower body dressing;grooming;feeding   Lower Body Dressing Assessment/Training   Comment, (Lower Body Dressing) did not follow commands to assess   Grooming Assessment/Training   Position (Grooming) unsupported sitting   [place] Level (Grooming) verbal cues;set up   Comment, (Grooming) pt washed his face with multiple verbal cues   Eating/Self Feeding   Comment, (Feeding) pt took a sip from a cup as OT was entering his room   Clinical Impression   Criteria for Skilled Therapeutic Interventions Met (OT) Yes, treatment indicated   OT Diagnosis Impaired ADLs   OT Problem List-Impairments impacting ADL cognition;strength;activity tolerance impaired;mobility   Assessment of Occupational Performance 3-5 Performance Deficits   Identified Performance Deficits dressing, hygiene, functional mobility, bathing, toileting   Planned Therapy Interventions (OT) ADL retraining;cognition;strengthening;progressive activity/exercise;risk factor education   Clinical Decision Making Complexity (OT) moderate complexity   Risk & Benefits  of therapy have been explained evaluation/treatment results reviewed;patient;participants included;care plan/treatment goals reviewed   Clinical Impression Comments OT evaluation completed. Pt is a poor historian and unsure about the reliability of the answers he did provide today. Pt presents with decreased cognition, unsure what his baseline is. Pt is requiring assistance for ADLs/functional mobility at this time and would not be safe to return home. Recommend STR. Plan to treat pt during his acute care stay and will progress ADLs and strength.   OT Discharge Planning   OT Discharge Recommendation (DC Rec) Transitional Care Facility   OT Rationale for DC Rec OT evaluation completed. Pt is a poor historian and unsure about the reliability of the answers he did provide today. Pt presents with decreased cognition, unsure what his baseline is. Pt is requiring assistance for ADLs/functional mobility at this time and would not be safe to return home. Recommend STR. Plan to treat pt during his acute care stay and will progress ADLs and strength.   OT Brief overview of current status pt did not follow commands to assess LB dressing, set up/multiple verbal cues for hygiene, set up for drinking, modA bed mob, Enio x2 transfers   Total Evaluation Time (Minutes)   Total Evaluation Time (Minutes) 11   OT Goals   Therapy Frequency (OT) 5 times/wk   OT Predicted Duration/Target Date for Goal Attainment 08/03/22   OT Goals Lower Body Dressing;Toilet Transfer/Toileting;Hygiene/Grooming   OT: Hygiene/Grooming modified independent   OT: Lower Body Dressing Modified independent   OT: Toilet Transfer/Toileting Modified independent

## 2022-07-27 NOTE — PROGRESS NOTES
[facility]    Hospitalist Progress Note      Assessment & Plan        Cuco Cooper is a 67 year old male who was admitted on 7/25/2022.    The patient is a 67-year-old gentleman who has a significant history of chronic lymphocytic leukemia, small lymphocytic lymphoma.  This has been present since at least 2012.  He has currently been on ibrutinib.  He has history of diabetes, seizure disorder, underlying COPD also.  He apparently presented to the ER back on 07/05/2022 with some complaints of generalized weakness then, he did have a cough.  No real shortness of breath at all.  Workup at that time showed that he had normal room air sats, was hemodynamically stable, afebrile.  Labs were significant.  CRP was up at 90.2.  White count 14,000.  COVID is negative.  Urine was clear.  Chest x-ray showed questionable multifocal pneumonia.  He was started on a Z-Car and given a short course of prednisone.  Apparently, he was brought back to the ER today as he was supposed to have an eye procedure done; however, he just felt poor.  He has not been eating or drinking well, has been more lethargic, has worsening cough, really seemingly has not gotten better since that trip to the ER back on 07/05.  He did see Dr. Unger for a preop on 07/19/2022.  Not much is said about his recent issues.  His sats were 95% on room air, pressure was 114/62 and he was basically cleared for surgery.  He came in today, brought by his daughter just because he was not feeling well at all, and was brought to the ER for evaluation.  His vital signs when he showed up in the ER were temperature of 98.2, blood pressure was 73/50, pulse 114.  Saturations are 96% on room air.  He was given some IV fluids.  He had a lot of workup done in the ER, which showed CRP was up to 180.  Ammonia was negative.  Procalcitonin is 1.17.  Lactic acid was 2.5.  White count was 18,000, hemoglobin was 7.  Urinalysis did have a 60 white cells and 3 red cells, white  blood cell clumps, bacteria were noted.  His COVID was negative.  Alcohol was negative.  CT scan of his chest, which showed multifocal nodular opacities throughout both lungs.  Whether it is an atypical infectious/inflammatory process or metastatic disease is unclear.  He had blood cultures done.  Urine culture was done.  He was given a dose of cefepime based on his urinalysis.  The plan was to be admitted here to our hospital for further evaluation and workup.     As I am seeing the patient down in the ER, he was very lethargic.  Difficult to understand him, but he did answer questions seemingly appropriately.  He just feels very fatigued and cold.  He has had the cough.  No real shortness of breath or chest pain.  Not much of an appetite.  No nausea, vomiting.  No diarrhea, no abdominal pain.  Normally, he is able to get up and move around on his own pretty well, just feels pretty wiped out, however.  He does have a very congested sounding cough currently.  His most recent vital signs were temperature of 96.6, blood pressure 105/67, pulse is 87, sats are 95% on room air.  Looking at the notes, I believe he got roughly 2 liters of saline IV, did get 1 dose of Maxipime, cefepime 2 grams in the ER also.     1.  Presumed community-acquired pneumonia: Patient does have a lot of upper airway rhonchi seemingly better today.  CT did show some scattered nodular findings.  Did have an elevated procalcitonin level mildly so.  He continues to be on room air.  Treating him currently with Rocephin and doxycycline.  Unable to get a sputum sample.  Still with a fever today up to 101.  Oxygenation is okay still procalcitonin did improve down to 0.87.  For now continue with her current regimen.  -7/27: No fevers in last 24 hours.  White count is down somewhat 17,000.  Procalcitonin is decreased 0.57 CRP is down 133.  Lungs seemed clear.  I think he is doing better regarding this and we will just continue this current antibiotics  for least another day consider de-escalating to orals.     2.  Question acute cystitis without hematuria: There is a fair amount of white cells.  So far his urine culture is negative for growth.  At this point presuming the Rocephin will cover things for now.  Unless cultures grow something differently.  -7/27: Urine has no growth.     3.  Anemia: Patient presented with hemoglobin in the 7 range.  Looking back in May his hemoglobin actually was in the 12 range.  Has drifted down it was 7.9 on July 19 as he was having a preop eval for some potential eye surgery.  It has drifted down to 6.5 today.  No obvious bleeding that we are aware of for that he admits although he is not the greatest historian.  Would like to give him a transfusion.  The patient is very adamant and need to speak with his hematologist Dr. Sarah regarding transfusion as he did have a reaction previously.  Did have history of warm antibodies.  Currently IVAN is negative.  No bleeding that we can see anywhere.  He has had a history of hemolytic anemia in the past.  LDH is normal.  Haptoglobin is pending.  We will await Dr. Sarah's return call but would anticipate transfusing him unless he has some other thoughts.  -7/27: I did speak with Dr. Sarah yesterday.  His initial thought was that this is going to be hemolysis due to his CLL as he has had that in the past however his IVAN was negative.  Still awaiting his haptoglobin.  He was down to 6.5 for his hemoglobin did transfuse 2 units he is up to 9.5.  His blood pressures are better he actually looks better also.  We will follow his hemoglobin closely.  He has had no evidence of any acute bleeding while here.     4.  Relative hypotension: Patient was pressure usually 110.  He has been running in the 90 systolic range she has been arterial pressures greater than 65 however.  Has been voiding although incontinent.  Did give another fluid bolus this morning 500 cc.  Continue to watch him closely.   His lactic acid was 1.3 this morning.  -7/27: Pressures been better he has been right around  systolic range.  This is was after the transfusion.  Continue to monitor closely.  He is getting some continuous IV fluid..  Creatinine are 35 and 0.88 today.  Sodium potassium are fine.     5.  CLL: White count was 17,000 today platelet count is 146,000.  He does take his ibrutinib.  -7/27: White counts okay hemoglobin improved after transfusion.  We will hold his ibrutinib at this point.     6.  Diabetes mellitus type 2: Patient is on Actos only.  Will cover with sliding scale currently.  -7/27: Sugars are okay 120-140 range.  Is eating a bit better.  Sliding scale coverage     7.  Lethargy.  Patient is very slow to answer questions.  I am not sure what his baseline is.  I anticipate is better than what he is now.  Is appropriate in his answers and such but very slow lethargic.  -7/27: He is seemingly a bit more alert.  Still very slow to answer fatigued he has been going downhill per his daughters for some time.  I do not have a clear answer for the reason for this.  But its not an overnight issue.  Current plan is to treat him for an infection he has been transfused see how he does.  His ability to get up and move is extremely poor.  Based on PT evaluation he would require placement in a structured nursing facility.  We will see how he does over the next 1 to 2 days.           Diet: Combination Diet Regular Diet Adult  Fluids: 100 cc saline    DVT Prophylaxis: Pneumatic Compression Devices  Code Status: No CPR- Do NOT Intubate    Disposition: Expected discharge in 2-3 days once either improves or facility is found.    Anselmo Gonzalez MD    Interval History   Patient is a little bit more bright and alert little pinker today.  Still very slow to respond.  Hemodynamically is more stable pressures around 100 systolic.  No fevers.  Labs are improving hemoglobin is up to 9.5 after 2 units of packed cells.  Still no  evidence of bleeding.  We will continue the antibiotics has had no fevers and seemingly getting better no evidence of UTI but suspect probable pneumonia.  We will see how he does PT will see him likely will need placement.    -Data reviewed today: I reviewed all new labs and imaging results over the last 24 hours. I personally reviewed no images or EKG's today.    Physical Exam   Temp: 96.9  F (36.1  C) Temp src: Tympanic BP: 104/56 Pulse: 66   Resp: 16 SpO2: 96 % O2 Device: None (Room air) Oxygen Delivery: 2 LPM  Vitals:    07/25/22 1352 07/26/22 0500 07/27/22 0600   Weight: 72.4 kg (159 lb 9.8 oz) 72.1 kg (158 lb 15.2 oz) 74.8 kg (164 lb 14.5 oz)     Vital Signs with Ranges  Temp:  [96.9  F (36.1  C)-99.3  F (37.4  C)] 96.9  F (36.1  C)  Pulse:  [66-99] 66  Resp:  [14-20] 16  BP: ()/(49-79) 104/56  Cuff Mean (mmHg):  [77] 77  SpO2:  [86 %-98 %] 96 %  I/O last 3 completed shifts:  In: 3855 [P.O.:960; I.V.:1725; IV Piggyback:500]  Out: -     Peripheral IV 07/26/22 Anterior;Left Lower forearm (Active)   Site Assessment WDL 07/27/22 0600   Line Status Saline locked 07/27/22 0600   Phlebitis Scale 0-->no symptoms 07/27/22 0600   Infiltration Scale 0 07/27/22 0600   Number of days: 1       Peripheral IV 07/26/22 Left Upper arm (Active)   Site Assessment WDL except;Ecchymotic 07/27/22 0600   Line Status Infusing 07/27/22 0600   Phlebitis Scale 0-->no symptoms 07/27/22 0600   Infiltration Scale 0 07/27/22 0600   Number of days: 1       Wound Other (Comment) Other (comment) (Active)   Number of days: 588     No line/device    Constitutional: Alert very slow to respond.  Does not appear in any distress    HEENT: Normocephalic/atraumatic, PERRL, EOMI, mouth moist, neck supple, no LN.     Cardiovascular: RRR.  No murmur, no  rubs, or gallops.  JVD flat.  Bruits no.  Pulses 2+    Respiratory: Seems clear today.  Still occasional rhonchi no obvious areas of consolidation.      Abdomen: Soft, nontender, nondistended, no  organomegaly. Bowel sounds present    Extremities: Warm/dry.  No edema    Neuro:   Non focal, cranial nerves intact, Moves all extremities.  Medications     sodium chloride 100 mL/hr at 07/27/22 0427       aspirin  81 mg Oral Daily     cefTRIAXone  1 g Intravenous Q24H     citalopram  40 mg Oral Daily     divalproex sodium extended-release  1,000 mg Oral BID     doxycycline (VIBRAMYCIN) IV  100 mg Intravenous Q12H     [Held by provider] ibrutinib  420 mg Oral Daily     insulin aspart  1-7 Units Subcutaneous TID AC     insulin aspart  1-5 Units Subcutaneous At Bedtime     levETIRAcetam  500 mg Oral BID     QUEtiapine  100 mg Oral At Bedtime     sodium chloride (PF)  3 mL Intracatheter Q8H       Data   Recent Labs   Lab 07/27/22  0839 07/27/22  0514 07/27/22  0204 07/26/22  0645 07/26/22  0556 07/26/22  0555 07/25/22  1342 07/25/22  1013   WBC  --  17.6*  --   --  17.3* 17.3*  --  18.6*   HGB  --  9.0*  --   --  6.5* 6.5*   < > 7.9*   MCV  --  97  --   --  100 100  --  101*   PLT  --  125*  --   --  146* 148*  --  162   INR  --   --   --   --   --   --   --  1.32*   NA  --  142  --   --  137  --   --  131*   POTASSIUM  --  3.7  --   --  4.0  --   --  4.3   CHLORIDE  --  112*  --   --  106  --   --  98   CO2  --  25  --   --  26  --   --  23   BUN  --  35*  --   --  38*  --   --  39*   CR  --  0.88  --   --  1.08  --   --  1.35*   ANIONGAP  --  5  --   --  5  --   --  10   IVON  --  7.9*  --   --  7.8*  --   --  8.3*   * 142* 124*   < > 117*  --    < > 199*   ALBUMIN  --  1.2*  --   --  1.2*  --   --  1.6*   PROTTOTAL  --  5.1*  --   --  5.0*  --   --  6.4*   BILITOTAL  --  0.6  --   --  0.6  --   --  1.0   ALKPHOS  --  134  --   --  131  --   --  192*   ALT  --  6  --   --  7  --   --  9   AST  --  16  --   --  17  --   --  23   LIPASE  --   --   --   --   --   --   --  25*    < > = values in this interval not displayed.       No results found for this or any previous visit (from the past 24 hour(s)).

## 2022-07-27 NOTE — PLAN OF CARE
Goal Outcome Evaluation:        Pt oriented to self, intermittently lethargic. BP soft but MAPS >65, afebrile. Frequent cough. Received 2 units PRBCs for hgb of 6.5, am recheck 9.0. NS at 100ml/hr, scheduled antibx. Turn and repo every 2 hours. Inc B&B. Scattered abrasions/bruises/scabs.        Face to face report given with opportunity to observe patient.    Report given to Cindy Lora, Genaro Durand, RN   7/27/2022  7:13 AM

## 2022-07-27 NOTE — PROGRESS NOTES
07/27/22 0921   Quick Adds   Type of Visit Initial PT Evaluation   Living Environment   Living Environment Comments Pt unable to provide any meaningful history.  Per case management pt lives with his daughter's ex boyfriend.  No additional details available   Self-Care   Activity/Exercise/Self-Care Comment Unknown prior level of function, pt unable to provide.  When asked does not respond, very slow processing   General Information   Onset of Illness/Injury or Date of Surgery 07/25/22   Referring Physician Dr. Gonzalez   Patient/Family Therapy Goals Statement (PT) pt unable to verbalize goal   Pertinent History of Current Problem (include personal factors and/or comorbidities that impact the POC) Pt admitted with community acquired pneumonia, anemia, hypotension.   General Observations Pt alert in bed but slow processing and slow to respond   Cognition   Affect/Mental Status (Cognition) low arousal/lethargic;flat/blunted affect   Orientation Status (Cognition) disoriented to;time   Follows Commands (Cognition) increased processing time needed;initiation impaired;repetition of directions required;25-49% accuracy   Pain Assessment   Patient Currently in Pain   (does not answer when asked)   Posture    Posture Forward head position;Protracted shoulders;Kyphosis   Range of Motion (ROM)   Range of Motion ROM is WFL   Strength (Manual Muscle Testing)   Strength (Manual Muscle Testing) Deficits observed during functional mobility   Strength Comments unable to MMT due to inability to process and follow commands   Bed Mobility   Bed Mobility supine-sit   Supine-Sit [place] (Bed Mobility) moderate assist (50% patient effort)   Bed Mobility Limitations decreased ability to use arms for pushing/pulling;decreased ability to use legs for bridging/pushing;impaired ability to control trunk for mobility;cognitive deficits   Impairments Contributing to Impaired Bed Mobility impaired balance;decreased strength   Transfers    Transfers sit-stand transfer   Transfer Safety Concerns Noted decreased balance during turns;losing balance backward   Impairments Contributing to Impaired Transfers decreased strength;impaired balance   Sit-Stand Transfer   Sit-Stand [place] (Transfers) minimum assist (75% patient effort);2 person assist   Assistive Device (Sit-Stand Transfers) walker, front-wheeled   Gait/Stairs (Locomotion)   [place] Level (Gait) minimum assist (75% patient effort);2 person assist   Assistive Device (Gait) walker, front-wheeled   Distance in Feet (Required for LE Total Joints) 5'   Pattern (Gait) step-to   Comment, (Gait/Stairs) repeated verbal cues to keep walker closer to body, unsteady on feet   Balance   Balance Comments fair with support from walker   Clinical Impression   Criteria for Skilled Therapeutic Intervention Yes, treatment indicated   PT Diagnosis (PT) gait disturbance   Influenced by the following impairments weakness, impaired cognition, impaired balance, decreased activity tolerance   Functional limitations due to impairments decreased tolerance and safety with all functional mobility and ADLs   Clinical Presentation (PT Evaluation Complexity) Evolving/Changing   Clinical Presentation Rationale clinical judgement   Clinical Decision Making (Complexity) moderate complexity   Planned Therapy Interventions (PT) balance training;gait training;patient/family education;stair training;strengthening;transfer training;bed mobility training;progressive activity/exercise;risk factor education   Anticipated Equipment Needs at Discharge (PT)   (TBD based on determining what he has at home and progress during hospitalization)   Risk & Benefits of therapy have been explained evaluation/treatment results reviewed;care plan/treatment goals reviewed;risks/benefits reviewed;participants included;patient   Clinical Impression Comments PT evaluation completed.  Pt unable to provide history in regards to living  arrangements or prior level of function.  Pt slow to progress and follow commands at this time.  Pt requiring mod A for bed mobility and min A of 2 for short distance ambulation and unsteady on feet.  Pt at this time is not safe to return home and will require short term rehab upon discharge.  Will see during acute care stay to progress strength and mobility.   PT Discharge Planning   PT Discharge Recommendation (DC Rec) Transitional Care Facility   PT Rationale for DC Rec PT evaluation completed.  Pt unable to provide history in regards to living arrangements or prior level of function.  Pt slow to progress and follow commands at this time.  Pt requiring mod A for bed mobility and min A of 2 for short distance ambulation and unsteady on feet.  Pt at this time is not safe to return home and will require short term rehab upon discharge.  Will see during acute care stay to progress strength and mobility.   PT Brief overview of current status sup>sit mod A, sit>stand min A of 2, ambulated 5' from bed>chair with FWW min A of 2 with frequent cues to initiate and progress, slow processing   Total Evaluation Time   Total Evaluation Time (Minutes) 18   Physical Therapy Goals   PT Frequency 6x/week   PT Predicted Duration/Target Date for Goal Attainment 08/03/22   PT Goals Bed Mobility;Transfers;Gait;Stairs   PT: Bed Mobility Modified independent;Supine to/from sit   PT: Transfers Modified independent;Sit to/from stand;Bed to/from chair;Assistive device   PT: Gait Modified independent;Rolling walker;150 feet   PT: Stairs Minimal assist;4 stairs;Rail on both sides

## 2022-07-27 NOTE — PROGRESS NOTES
PAS-RR    Per DHS regulation, CTS team completed and submitted PAS-RR to [place] [employer] via the [employer]. CTS team advised SNF and they are aware a PAS-RR has been submitted.     CTS team reviewed with pt or health care agent that they may be contacted for a follow up appointment within 10 days of hospital discharge if SNF stay is less than 30 days. Contact information for [employer] was also provided.     Pt or health care agent verbalized understanding.     PAS-RR # [id number]    Mary Orr CM

## 2022-07-27 NOTE — CARE PLAN
Blood pressure 91/56, pulse 87, temperature 99.2  F (37.3  C), temperature source Tympanic, resp. rate 18, height 1.829 m (6'), weight 72.1 kg (158 lb 15.2 oz), SpO2 98 %.    IV Rocephin and Doxy. Cnts to be lethargic, but rouses to voice/gentle shaking. Awake to answer questions, but doses off easily. Oriented to self. Able to deny pain. Blood pressures remain soft with MAPs adequate above 65. NS infusing at 100 mL/hr. 1 unit of blood infusing for Hg of 6.5. Will receive 2nd unit on nights with Hg recheck with AM labs. LSs are diminished bilaterally. Initially needed 2 LPM for sats in the mid 80s on RA while sleeping. Able to titrate to RA once pt showed more alertness at dinnertime. Did not eat much and only drank some grapejuice. Cnts to be incontinent of urine with brief changes provided. Foam dressing placed to left hip and sacrum. Scattered bruising/scabbing. Q2 hr turns. Bed in lowest position. Call light in reach. ID band in place. Bed alarms on. Daughters at bedside this evening.     PLAN: Cnt IV abx and infuse RBCs     Face to face report given with opportunity to observe patient.    Report given to ARIAS Torres RN   7/26/2022  7:05 PM

## 2022-07-27 NOTE — PROGRESS NOTES
Contacted SAMEER Chacon, she will call daughter Panfilo regarding LTC application.    Referral sent to Dickson Orr CM

## 2022-07-28 PROBLEM — G93.40 ACUTE ENCEPHALOPATHY: Status: ACTIVE | Noted: 2022-01-01

## 2022-07-28 PROBLEM — R78.81 BACTEREMIA: Status: ACTIVE | Noted: 2022-01-01

## 2022-07-28 NOTE — PLAN OF CARE
Goal Outcome Evaluation:      Patient transferred to ICU at 1130 for closer monitoring due to new abx therapy. VSS, afebrile. Patient is intermittently disoriented to all but self. Lethargic, falling asleep in the middle of assessment or conversations. Remains on RA, O2 sats high 90's. LS clear. Pt with frequent congested cough. HRR. Tele SR. BS active. Scattered bruising and scabbing throughout, foam dressings to coccyx, left hip, and back clean dry intact. Seizure precautions maintained. Incontinent of urine. Turned and repositioned q2H. Frequent monitoring continued.     Face to face report given with opportunity to observe patient.    Report given to ARIAS Brar RN   7/28/2022  7:07 PM

## 2022-07-28 NOTE — PROGRESS NOTES
07/28/22 0900   Signing Clinician's Name / Credentials   Signing clinician's name / credentials Heidi Jimenez DPT   Quick Adds   Rehab Discipline PT   Quick Adds Interventions Therapeutic Activity   Therapeutic Activity   Minutes of Treatment 15 minutes   Symptoms Noted During/After Treatment   (Confusion; difficulty tollowing directions)   Treatment Detail Pt seated in recliner at start of session. Reported that he was urinating in brief. Encouraged pt to get to commode but he declined. Pt needed lots of cueing and min A x2 for sit to stand trasnfer using FWW. Pt's brief was changed and self cares completed c total A. Attempted to ambulate and do seated exercises however pt unable to follow commands for participation   PT Discharge Planning   PT Discharge Recommendation (DC Rec) Transitional Care Facility   PT Rationale for DC Rec Pt slow to progress and follow commands at this time.  Pt requiring mod A for bed mobility and min A of 2 for short distance ambulation and unsteady on feet.  Pt at this time is not safe to return home and will require short term rehab upon discharge.  Will see during acute care stay to progress strength and mobility.   PT Brief overview of current status Pt seated in recliner at start of session. Reported that he was urinating in brief. Encouraged pt to get to commode but he declined. Pt needed lots of cueing and min A x2 for sit to stand trasnfer using FWW. Pt's brief was changed and self cares completed c total A. Attempted to ambulate and do seated exercises however pt unable to follow commands for participation   Additional Documentation   PT Plan Progress mobility as tolerated   Total Session Time   Total Session Time (minutes) 15 minutes

## 2022-07-28 NOTE — PROGRESS NOTES
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 Will continue doxycycline but stop rocephin and treat for nocardia.       Bacteremia,nocardia: Aerobic bottle x2 sets from admission cultures positive overnight. Sensitivities are pending. Spoke with Dr. Moncada, preference is for transfer however there are no beds in the state available. Move to ICU. Due to unknown sulfa allergy will do 10% test dose then start 15mg/kg/day every 6 hours. Suspect both pulmonary and CNS involvement with new encephalopathy that family noted started rather abruptly prior to arrival. Will need imipenem-seizure precautions with rescue meds at bedside. Also starting amikacin. Get another set of BC today. CT head to look for obvious signs of infection.       Acute encephalopathy: Suspect CNS infection with nocardia. Treat as above. He is stable from presentation, fatigued and sleeps unless aroused. CT today, if negative may also need MRI.       CLL (chronic lymphocytic leukemia) with leukocytosis(H): Chronic, followed by Dr. Sarah. As an outpatient has been on ibrutinib.      Bipolar disorder, unspecified (H): Neuro changes more difficult to discern, currenty only on celexa and seroquel at home.       Hemolytic anemia associated with lymphoproliferative disorder (H): Hemoglobin < 7, after speaking with Dr. Sarah he did get 2 untis PRBC's transfused 7/27 and now running >9 hgb.       Sepsis, due to unspecified organism, unspecified whether acute organ dysfunction present (H): Due to nocardia bacteremia, possibly separate bacterial pneumonia and UTI with culture still pending. Treating with antibiotics as above, continue IVF as he is taking minimal oral fluids.       Acute cystitis without hematuria: Gram positive colonies on cultures, ID  still pending. Hopefully will be covered with bactrim and doxycycline.       Essential hypertension: Blood pressures have been stable since arrival, no pressors needed.       Seizure disorder (H): Imipenem required for life threatening nocardia infection, however it will potentially bind his valproic acid so he has increased risk of seizures. He is on seizure precautions and will have emergency intervention at bedside. If he does develop seizures will bolus keppra.       Diabetes mellitus, type II (H): QID glucose with sliding scale. His sugars have been pretty stable and he has not required any insulin coverage.       DVT Prophylaxis: Heparin SQ  Code Status: No CPR- Do NOT Intubate    Disposition: Expected discharge when bed available for transfer.    Astrid Vin, CNP    Interval History   Remains fatigued, does wake to voice but falls asleep again quickly. Denies chest pain, dyspnea, abdominal pain or nausea.     -Data reviewed today: I reviewed all new labs and imaging results over the last 24 hours.     Physical Exam   Temp: 97.6  F (36.4  C) Temp src: Tympanic BP: 108/72 Pulse: 75   Resp: 19 SpO2: 96 % O2 Device: None (Room air)    Vitals:    07/26/22 0500 07/27/22 0600 07/28/22 0445   Weight: 72.1 kg (158 lb 15.2 oz) 74.8 kg (164 lb 14.5 oz) 75.9 kg (167 lb 5.3 oz)     Vital Signs with Ranges  Temp:  [96.8  F (36  C)-98.7  F (37.1  C)] 97.6  F (36.4  C)  Pulse:  [72-91] 75  Resp:  [15-22] 19  BP: ()/(57-79) 108/72  SpO2:  [89 %-97 %] 96 %  I/O last 3 completed shifts:  In: 3864 [P.O.:1740; I.V.:9904]  Out: -     Peripheral IV 07/26/22 Anterior;Left Lower forearm (Active)   Site Assessment WDL 07/28/22 1100   Line Status Saline locked 07/28/22 1100   Phlebitis Scale 0-->no symptoms 07/28/22 1100   Infiltration Scale 0 07/28/22 1100   Infiltration Site Treatment Method  None 07/27/22 1500   If infiltrated, was a vesicant infusing? No 07/27/22 1500   Number of days: 2       Peripheral IV 07/26/22 Left  Upper arm (Active)   Site Assessment WDL 07/28/22 1100   Line Status Infusing 07/28/22 1100   Phlebitis Scale 0-->no symptoms 07/28/22 1100   Infiltration Scale 0 07/28/22 1100   Infiltration Site Treatment Method  None 07/27/22 1500   If infiltrated, was a vesicant infusing? No 07/27/22 1500   Number of days: 2       Wound Other (Comment) Other (comment) (Active)   Number of days: 589       Wound Buttocks Pressure injury suspected hospital acquired (Active)   Wound Bed Erythema, non-blanchable, skin intact 07/28/22 0400   Anne-wound Assessment Erythema 07/28/22 0400   Estimated Circumference ( if not measured quarter sized 07/28/22 0400   Drainage Amount None 07/28/22 0400   Dressing Foam 07/28/22 1020   Dressing Status Clean, dry, intact 07/28/22 1020   Number of days: 0     Line/device assessment(s) completed for medical necessity    Constitutional: Sleeps unless aroused, pale ill appearing.  Respiratory: Diminished bilaterally with poor effort, no crackles, rhonchi or wheezing noted.   Cardiovascular: HRR, no murmurs,rubs,thrills. Heart tones are distant.   GI: Soft,nontender, bowel sound are positive .  Skin/Integumen: No unusual rashes, open areas or bruising noted.   Other:      Medications     - MEDICATION INSTRUCTIONS -       - MEDICATION INSTRUCTIONS -       sodium chloride 100 mL/hr at 07/28/22 0255       amikacin (AMIKIN) intermittent infusion 250 mL  7.5 mg/kg Intravenous Q12H     aspirin  81 mg Oral Daily     citalopram  40 mg Oral Daily     divalproex sodium extended-release  1,000 mg Oral BID     doxycycline (VIBRAMYCIN) IV  100 mg Intravenous Q12H     heparin ANTICOAGULANT  5,000 Units Subcutaneous Q12H     imipenem-cilastatin (PRIMAXIN) IV  500 mg Intravenous Q6H     insulin aspart  1-7 Units Subcutaneous TID AC     insulin aspart  1-5 Units Subcutaneous At Bedtime     levETIRAcetam  500 mg Oral BID     QUEtiapine  100 mg Oral At Bedtime     sodium chloride (PF)  3 mL Intracatheter Q8H      sulfamethoxazole-trimethoprim  250 mg Intravenous Once     sulfamethoxazole-trimethoprim  280 mg Intravenous Q6H       Data   Recent Labs   Lab 07/28/22  0909 07/28/22  0526 07/28/22  0206 07/27/22  0839 07/27/22  0514 07/26/22  0645 07/26/22  0556 07/25/22  1342 07/25/22  1013   WBC  --  17.4*  --   --  17.6*  --  17.3*   < > 18.6*   HGB  --  9.4*  --   --  9.0*  --  6.5*   < > 7.9*   MCV  --  96  --   --  97  --  100   < > 101*   PLT  --  113*  --   --  125*  --  146*   < > 162   INR  --   --   --   --   --   --   --   --  1.32*   NA  --  138  --   --  142  --  137  --  131*   POTASSIUM  --  3.9  --   --  3.7  --  4.0  --  4.3   CHLORIDE  --  110*  --   --  112*  --  106  --  98   CO2  --  24  --   --  25  --  26  --  23   BUN  --  29  --   --  35*  --  38*  --  39*   CR  --  0.84  --   --  0.88  --  1.08  --  1.35*   ANIONGAP  --  4  --   --  5  --  5  --  10   IVON  --  7.8*  --   --  7.9*  --  7.8*  --  8.3*   * 119* 106*   < > 142*   < > 117*   < > 199*   ALBUMIN  --  1.1*  --   --  1.2*  --  1.2*  --  1.6*   PROTTOTAL  --  4.9*  --   --  5.1*  --  5.0*  --  6.4*   BILITOTAL  --  0.7  --   --  0.6  --  0.6  --  1.0   ALKPHOS  --  183*  --   --  134  --  131  --  192*   ALT  --  7  --   --  6  --  7  --  9   AST  --  19  --   --  16  --  17  --  23   LIPASE  --   --   --   --   --   --   --   --  25*    < > = values in this interval not displayed.       No results found for this or any previous visit (from the past 24 hour(s)).

## 2022-07-28 NOTE — PLAN OF CARE
Most recent vitals: /69 (BP Location: Right arm, Patient Position: Semi-Flowers's)   Pulse 90   Temp 98.6  F (37  C) (Tympanic)   Resp 16   Ht 1.829 m (6')   Wt 75.9 kg (167 lb 5.3 oz)   SpO2 (!) 90%   BMI 22.69 kg/m      Pain Management:  denies pain this shift, states no, rflacc 0  LOC:  Alert to self,  Garbled speech, only one word answers this shift when asked a question  Cardiac:  WNL  Respiratory:  Lungs rhonchi, on RA w/ O2 sats 90-94%, congested non prod cough  GI:  WNL, bowel sounds audible  :  Incontinent of urine  Skin Issues:  Scattered bruising/scabing, redness blanchable to coccyx/buttocks, sacral foam in place CDI, foam placed this shift on R outer buttocks/hip,dime size redness., scrotal redness barrier applied, repo q 2    IVF:  NS 100ml/hr  ABX:  IV rochephin, IV doxy    Nutrition: reg diet  at 0200  ADL's:  Assist 2  Ambulation: Not OOB this shift  Safety:  Seizure pads in place, room door open, bed alarm on and audible,call light within reach, room by nurses station    Critical lab this shift, MD notified:  Gram pos beaded branching filaments 1 of 2 bottles    Face to face report given with opportunity to observe patient.    Report given to ARIAS Ingram RN   7/28/2022  7:19 AM

## 2022-07-28 NOTE — PLAN OF CARE
Pt is Alert to self, slow to respond. VS as charted afebrile, denies pain.     Transferred to ICU Report given to Emily BIANCHI.    Face to face report given with opportunity to observe patient.    Report given to Emily and ARIAS Olmos RN   7/28/2022  11:57 AM

## 2022-07-28 NOTE — PROGRESS NOTES
07/28/22 1400   Appointment Canceled   Appointment Canceled Medical status;Hold (see Cancel Comments row)   Cancel Comments Pt was moved to ICU waiting new orders if indicated.   Signing Clinician's Name / Credentials   Signing clinician's name / credentials Rosa Nguyen OTR/L,CLT   Quick Adds   Rehab Discipline OT

## 2022-07-28 NOTE — PHARMACY
Pharmacy Antimicrobial Stewardship Assessment     Current Antimicrobial Therapy:  Anti-infectives (From now, onward)    Start     Dose/Rate Route Frequency Ordered Stop    22 2000  sulfamethoxazole-trimethoprim (BACTRIM) 280 mg in D5W 567.5 mL intermittent infusion         280 mg  567.5 mL/hr over 60 Minutes Intravenous EVERY 6 HOURS 22 1112      22 1430  amikacin (AMIKIN) 550 mg in D5W 250 mL intermittent infusion         7.5 mg/kg × 75.9 kg  250 mL/hr over 60 Minutes Intravenous EVERY 12 HOURS 22 1230      22 1400  imipenem-cilastatin (PRIMAXIN) 500 mg in sodium chloride 0.9 % 100 mL intermittent infusion         500 mg  over 30 Minutes Intravenous EVERY 6 HOURS 22 1117      22 1600  doxycycline (VIBRAMYCIN) 100 mg in sodium chloride 0.9 % 100 mL intermittent infusion         100 mg  over 1-2 Hours Intravenous EVERY 12 HOURS 22 1503          Indication: Nocardia bacteremia with CNS component, CAP    Days of Therapy:   Doxycycline: day 4  Amikacin: day 1  Imipenem-cilastatin: day 1  Bactrim: day 1     Pertinent Labs:  Recent Labs   Lab Test 22  0526 22  0514 22  0556   WBC 17.4* 17.6* 17.3*     Recent Labs   Lab Test 22  0526 22  0514 22  0816 22  0556 22  1320 22  1110 22  1013 22  0150 22  0150 20  1205 17  1340 10/31/15  0605 10/29/15  2030 08/31/15  1420 14  0535 14  1625   LACT  --   --  1.3  --  2.5*   < > 5.0*  --  1.9   < >  --  1.1   < >  --    < >  --    CRP 92.0* 133.0*  --  141.0*  --   --  180.0*  --  90.2*  --  5.4 5.8  --  <2.9   < > 2.1   SED  --   --   --   --   --   --   --   --   --   --   --   --   --   --   --  9   PCAL 0.44* 0.57*  --  0.87*  --   --  1.17*   < >  --    < >  --   --   --   --   --   --     < > = values in this interval not displayed.        Temperature:  Temp (24hrs), Av.8  F (36.6  C), Min:97.2  F (36.2  C), Max:98.7  F (37.1   C)    Culture Results:       Recommendations/Interventions:  1. Bactrim, Primaxin and Amikacin all started today. Pt tolerated Bactrim (will continue). Primaxin has major severe interaction with Depakote (seizure precautions in place).   2. Will monitor amikacin levels.  3. Gram positive bacilli in urine culture should be covered by current antibiotics, but will continue to follow closely as patient is critically ill   4. Consider drawing valproic acid level if patient continues on Primaxin.    Deepali Brown, [facility]  July 28, 2022

## 2022-07-28 NOTE — PLAN OF CARE
Goal Outcome Evaluation: Unchanged   Reason for hospital stay:  Seizure disorder   Living situation PTA: Lives by himself.   Most recent vitals: /64 (BP Location: Right arm, Patient Position: Supine, Cuff Size: Adult Regular)   Pulse 78   Temp 97.5  F (36.4  C) (Tympanic)   Resp 18   Ht 1.829 m (6')   Wt 74.8 kg (164 lb 14.5 oz)   SpO2 97%   BMI 22.37 kg/m      Pain Management:  Pt denies having pain.   LOC:  Alert and orientated to self. Not orientated to place, time, and situation.   Cardiac:  Apical pulse regular.   Respiratory:  All fields clear,diminished   GI:  All quadrants audible and normoactive.   :  Incontinence of urine and stool   Skin Issues:  Skin is warm, dry, and pale. Scattered bruises, scabs, and abrasions. Redness blanchable buttock/coccyx.     IVF:  NS at 100 mL/hr.   ABX: IV Rocephin, IV Doxycycline     Nutrition: Adequate. Combo/Regular diet.   Ambulation: Assist x 2 with gait belt and walker.   Safety:  bed in the low position, call light within reach, ID band in place, personal items within reach, free from falls, use of call light appropriately, and makes needs known.     Comments: VSS. Afebrile. Seizure pads in place. Face to face report given with opportunity to observe patient.    Report given to Karen AHMADI RN.     Lashell Sutton RN   7/27/2022  11:28 PM

## 2022-07-28 NOTE — PROVIDER NOTIFICATION
Checked with CNP about no order to recheck hgb- morning lab check is ok as only on 1st bag of RBCs.  
DATE:  7/26/2022   TIME PROVIDER NOTIFIED:  0615  NOTIFICATION INFORMATION:  Fever 101.9, BP's 83/51 & 92/48.  NOTIFICATION GIVEN WITH READ-BACK TO (PROVIDER):  Anselmo Gonzalez MD Cassie M. Gosser, RN on 7/26/2022 at 6:22 AM  
DATE:  7/27/2022   TIME OF RECEIPT FROM LAB:  5858  LAB TEST:  Blood Culture  LAB VALUE:  1 bottle gram positive beaded branching filaments  RESULTS GIVEN WITH READ-BACK TO (PROVIDER):  Anselmo Gonzalez MD  TIME LAB VALUE REPORTED TO PROVIDER:   2818  
DATE:  7/28/2022   TIME OF RECEIPT FROM LAB:  0223  LAB TEST:  Blood culture 1 of two bottles   LAB VALUE:  Gram positive branching beaded filaments   RESULTS GIVEN WITH READ-BACK TO (PROVIDER): Dr. Perales  TIME LAB VALUE REPORTED TO PROVIDER:   0228    
Notified of bp of 80/50   
no fever and no chills.

## 2022-07-29 NOTE — PROGRESS NOTES
07/29/22 1500   Appointment Canceled   Appointment Canceled Fatigue;Medical status   Cancel Comments Pt was moved to ICU yesterday and is fatigued. Will hold OT until new orders indicated for continued therapy.   Signing Clinician's Name / Credentials   Signing clinician's name / credentials Rosa Nguyen OTR/DAIANA,CLT

## 2022-07-29 NOTE — PROGRESS NOTES
"CLINICAL NUTRITION SERVICES  -  ASSESSMENT NOTE    Cuco Cooper : LOS    67 yom admitted for nocardia bacteremia. Pt has a medical hx including CLL, HTN, dyslipidemia, type 2 diabetes, seizure disorder, TBI, bipolar affective disorder. Unable to find any recent A1C. BG around 100-160. Pt is very sleepy. Poor intake this admission. Poor intake before admission. First measured weight this admission would indicate a weight loss of 24lbs in the last 2.5 months. Weight gain of 15lbs this admission (4 days). Net I/Os +14.5L. Edema in lower extremities.     Diet Order: NPO- started today. Was on a regular diet  Intake: 7 meals with 0-75% intake    Height: 6' 0\"  Weight: 174 lbs 9.67 oz  Body mass index is 23.68 kg/m .  Weight Status:  Normal BMI  Weight History: first measured weight this admission 72.4kg (159lb 9.8 oz). 13% weight loss in about 2.5 months if admit weight is correct.   Wt Readings from Last 10 Encounters:   07/29/22 79.2 kg (174 lb 9.7 oz)   05/10/22 83.3 kg (183 lb 10.3 oz)*   02/08/22 84.2 kg (185 lb 10 oz)*   12/16/20 83.1 kg (183 lb 3.2 oz)   06/14/19 95.3 kg (210 lb)   06/11/17 95.3 kg (210 lb)   11/02/15 95.9 kg (211 lb 6.7 oz)   08/31/15 86.2 kg (190 lb)   08/17/15 89.6 kg (197 lb 8.5 oz)   07/27/15 90.8 kg (200 lb 2.8 oz)   11/13/14 107 kg (235 lb 14.3 oz)   06/03/14 92.6 kg (204 lb 2.3 oz)        Weight used to calculate needs: 79.2kg- current weight   Estimated Energy Needs: 2532-1914 kcals (20-25 Kcal/Kg)   Estimated Protein Needs:  grams protein (1-1.5 g pro/Kg)  Estimated Fluid Needs: 3771-8228  mL (1 mL/Kcal)    MALNUTRITION:  % Weight Loss:  > 7.5% in 3 months (severe malnutrition)  % Intake:  </= 75% for >/= 1 month (severe malnutrition)    Malnutrition Diagnosis: Severe malnutrition. Likely some degree of malnutrition even if admit weight is inaccurate.  In Context of:  Chronic illness or disease    NUTRITION DIAGNOSIS:  Malnutrition related to inadequate energy/protein itnake as " evidenced by 13% weight loss in 2.5 months    NUTRITION RECOMMENDATIONS  - Advance diet as able. Add Ensure with/between meals  - May need to consider nutrition support if oral intake remains inadquate    MONITORING AND EVALUATION:  RD will monitor diet order, intake, weight, labs

## 2022-07-29 NOTE — PLAN OF CARE
Patient lethargic, falling asleep in the middle of questions, during assessments. Disoriented to time and situation. Made NPO this AM due to patient coughing with water and pill intake. Patient did clear and become more alert this afternoon, only disoriented to situation, able to hold a longer conversation. However, patient kept NPO as bedside swallow eval still failed as patient coughing with water intake. VSS, afebrile. Denies pain, rFLACCs 0. LS with scattered fine crackles and rhonci throughout. Lungs clearer this afternoon since NPO status. Remains on RA, O2 sats high 90's. HRR. Tele: Sinus dysrhythmia. BS active to hyperactive. Generalized edema from feet to flank to arms. Severe edema to bilateral ankles. Heels floated throughout the day. Foam dressing to coccyx and scattered foams to abrasions. Scattered petechiae throughout.Turned and repositioned q2H. Incontinent of urine. IV abx continue. Patient free of allergic reactions or seizures this shift. IV NS 25 mL/hr. Emergency equipment remains set up at bedside.     Face to face report given with opportunity to observe patient.    Report given to ARIAS Vu RN   7/29/2022  7:16 PM

## 2022-07-29 NOTE — PLAN OF CARE
"Pt alert to self and place. Flat affect, cooperative with cares, slow to respond. Neuro's intact. VS and assessments as charted. T max 100.0, c/o pain and when asked to describe he states \" brain\" but unable to describe any further into detail so rFlacc scale utilized and scoring 4- MD updated and order rec'd for Tylenol- administered per MAR. LE's were initially cool and mottled- have since warmed up and mottling improved- noted mottling only to knees at 0400 assessment. Lungs coarse, occasionally with insp/ exp wheezing, SP02 adequate on RA. Congested, non productive cough. HRR- SR 90's. Encouraging oral hydration. Incontinent of urine. Turn and repositioned every 2 hours. Pt has received IV antibiotics per MAR and has remained free of s/s of allergic reaction or any seizure activity. Pt does have a petechiae/ red rash to back and L thigh/hip region as well as armpit which was present prior to 2000 abx administration and has remained unchanged in appearance.Seizure precautions in place and emergency supplies remain at bedside.  Free from falls/ injury, call light within reach and alarms activated.       Face to face report given with opportunity to observe patient.    Report given to ARIAS Diaz and ARIAS Napoles RN   7/29/2022  6:59 AM               "

## 2022-07-29 NOTE — CARE PLAN
Pt tolerated 2000 Primaxin IV, Bactrim IV currently infusing and no S/S of reaction or seizure activity. RN at bedside.

## 2022-07-29 NOTE — PLAN OF CARE
Another round of bactrim and primaxin completed, no signs or symptoms of allergic reaction, no seizures.

## 2022-07-29 NOTE — PHARMACY
Pharmacy Antimicrobial Stewardship Assessment     Current Antimicrobial Therapy:  Anti-infectives (From now, onward)    Start     Dose/Rate Route Frequency Ordered Stop    07/29/22 0830  sulfamethoxazole-trimethoprim (BACTRIM) 280 mg in D5W 567.5 mL intermittent infusion         280 mg Intravenous EVERY 6 HOURS 07/29/22 0814      07/28/22 2000  sulfamethoxazole-trimethoprim (BACTRIM) 280 mg in D5W 567.5 mL intermittent infusion         280 mg  567.5 mL/hr over 60 Minutes Intravenous EVERY 6 HOURS 07/28/22 1112      07/28/22 1430  amikacin (AMIKIN) 550 mg in D5W 250 mL intermittent infusion         7.5 mg/kg × 75.9 kg  250 mL/hr over 60 Minutes Intravenous EVERY 12 HOURS 07/28/22 1230      07/28/22 1400  imipenem-cilastatin (PRIMAXIN) 500 mg in sodium chloride 0.9 % 100 mL intermittent infusion         500 mg  over 30 Minutes Intravenous EVERY 6 HOURS 07/28/22 1117      07/25/22 1600  doxycycline (VIBRAMYCIN) 100 mg in sodium chloride 0.9 % 100 mL intermittent infusion         100 mg  over 1-2 Hours Intravenous EVERY 12 HOURS 07/25/22 1503          Indication: Meningitis (Bacteremia), Amikacin & Primaxin (nocardia bacteremia), CAP    Days of Therapy:   Amikacin: day 2  Primaxin: day 2  Bactrim: day 2  Doxycyline: 5     Pertinent Labs:  Recent Labs   Lab Test 07/29/22  0439 07/28/22  0526 07/27/22  0514   WBC 15.0* 17.4* 17.6*     Recent Labs   Lab Test 07/29/22  0439 07/28/22  0526 07/27/22  0514 07/26/22  0816 07/26/22  0556 07/25/22  1320 07/25/22  1110 07/25/22  1013 07/05/22  0150 07/05/22  0150 12/13/20  1205 06/11/17  1340 10/31/15  0605 06/05/14  0535 06/03/14  1625   LACT  --   --   --  1.3  --  2.5*   < > 5.0*  --  1.9   < >  --  1.1   < >  --    CRP 84.8* 92.0* 133.0*  --  141.0*  --   --  180.0*  --  90.2*  --  5.4 5.8   < > 2.1   SED  --   --   --   --   --   --   --   --   --   --   --   --   --   --  9   PCAL 0.50* 0.44* 0.57*  --  0.87*  --   --  1.17*   < >  --    < >  --   --   --   --     < > =  values in this interval not displayed.        Temperature:  Temp (24hrs), Av.2  F (36.8  C), Min:96.7  F (35.9  C), Max:100  F (37.8  C)    Culture Results:       Recommendations/Interventions:  1. Will get amikacin trough and peak before/after this afternoon's dose.   2. Pt tolerating Bactrim infusion with no s/s reaction.  3. Serum Creatinine has been stable.     No recommendations at this time.    Deepali Brown, [facility]  2022

## 2022-07-29 NOTE — PROGRESS NOTES
[facility]    Hospitalist Progress Note    Date of Service (when I saw the patient): 07/29/2022    Assessment & Plan       Pneumonia, community acquired: Probable nocardia though can not rule out multiple organisims with his immunosuppression. Will continue doxycycline but stop rocephin and treat for nocardia.       Bacteremia,nocardia: Aerobic bottle x2 sets from admission cultures positive overnight. Sensitivities are pending. Spoke with Dr. Moncada, preference is for transfer however there are no beds in the state available. Move to ICU. Due to unknown sulfa allergy will do 10% test dose then start 15mg/kg/day every 6 hours. Suspect both pulmonary and CNS involvement with new encephalopathy that family noted started rather abruptly prior to arrival. Will need imipenem-seizure precautions with rescue meds at bedside. Also starting amikacin. Get another set of BC today. CT head to look for obvious signs of infection.   -7/29: No brain abscess on CT scan, tolerating Bactrim without any reactions, no seizure activity overnight. Continue above plan.       Acute encephalopathy: Suspect CNS infection with nocardia. Treat as above. He is stable from presentation, fatigued and sleeps unless aroused. CT today, if negative may also need MRI.   -7/29: No change, still very sleepy but arousable.       CLL (chronic lymphocytic leukemia) with leukocytosis(H): Chronic, followed by Dr. Sarah. As an outpatient has been on ibrutinib.      Bipolar disorder, unspecified (H): Neuro changes more difficult to discern, currenty only on celexa and seroquel at home.       Hemolytic anemia associated with lymphoproliferative disorder (H): Hemoglobin < 7, after speaking with Dr. Sarah he did get 2 untis PRBC's transfused 7/27 and now running >9 hgb.       Sepsis, due to unspecified organism, unspecified whether acute organ dysfunction present (H): Due to nocardia bacteremia, possibly separate bacterial pneumonia and UTI with  culture still pending. Treating with antibiotics as above, continue IVF as he is taking minimal oral fluids.       Acute cystitis without hematuria: Gram positive colonies on cultures, ID still pending. Hopefully will be covered with bactrim and doxycycline.  -7/29: Nocardia growing in urine as well.        Essential hypertension: Blood pressures have been stable since arrival, no pressors needed.       Seizure disorder (H): Imipenem required for life threatening nocardia infection, however it will potentially bind his valproic acid so he has increased risk of seizures. He is on seizure precautions and will have emergency intervention at bedside. If he does develop seizures will bolus keppra.       Diabetes mellitus, type II (H): QID glucose with sliding scale. His sugars have been pretty stable and he has not required any insulin coverage.       DVT Prophylaxis: Heparin SQ  Code Status: No CPR- Do NOT Intubate    Disposition: Expected discharge when bed available for transfer.    Astrid Banuelos, CNP    Interval History   Remains fatigued, does wake to voice but falls asleep again quickly. Denies chest pain, dyspnea, abdominal pain or nausea.     -Data reviewed today: I reviewed all new labs and imaging results over the last 24 hours.     Physical Exam   Temp: 100  F (37.8  C) Temp src: Tympanic BP: 94/61 Pulse: 92   Resp: 23 SpO2: 95 % O2 Device: None (Room air)    Vitals:    07/27/22 0600 07/28/22 0445 07/29/22 0405   Weight: 74.8 kg (164 lb 14.5 oz) 75.9 kg (167 lb 5.3 oz) 79.2 kg (174 lb 9.7 oz)     Vital Signs with Ranges  Temp:  [97  F (36.1  C)-100  F (37.8  C)] 100  F (37.8  C)  Pulse:  [70-96] 92  Resp:  [15-24] 23  BP: ()/(52-84) 94/61  SpO2:  [87 %-100 %] 95 %  I/O last 3 completed shifts:  In: 6472 [P.O.:1320; I.V.:5152]  Out: -     Peripheral IV 07/26/22 Anterior;Left Lower forearm (Active)   Site Assessment WDL 07/29/22 0600   Line Status Infusing;Checked every 1-2 hour 07/29/22 0600    Phlebitis Scale 0-->no symptoms 07/29/22 0600   Infiltration Scale 0 07/28/22 1800   Infiltration Site Treatment Method  None 07/27/22 1500   If infiltrated, was a vesicant infusing? No 07/27/22 1500   Number of days: 3       Peripheral IV 07/26/22 Left Upper arm (Active)   Site Assessment WDL except;Edematous 07/29/22 0600   Line Status Saline locked 07/29/22 0600   Phlebitis Scale 0-->no symptoms 07/29/22 0600   Infiltration Scale 0 07/28/22 1800   Infiltration Site Treatment Method  None 07/27/22 1500   If infiltrated, was a vesicant infusing? No 07/27/22 1500   Number of days: 3       Wound Other (Comment) Other (comment) (Active)   Number of days: 590       Wound Buttocks Pressure injury suspected hospital acquired (Active)   Wound Bed Other (Comment) 07/29/22 0357   Anne-wound Assessment Erythema 07/28/22 0400   Estimated Circumference ( if not measured quarter sized 07/28/22 0400   Drainage Amount UTV 07/28/22 1615   Dressing Foam 07/29/22 0357   Dressing Status Clean, dry, intact 07/29/22 0357   Number of days: 1     Line/device assessment(s) completed for medical necessity    Constitutional: Sleeps unless aroused, pale ill appearing.  Respiratory: Diminished bilaterally with poor effort, no crackles, rhonchi or wheezing noted.   Cardiovascular: HRR, no murmurs,rubs,thrills. Heart tones are distant.   GI: Soft,nontender, bowel sound are positive .  Skin/Integumen: No unusual rashes, open areas or bruising noted.   Other:      Medications     - MEDICATION INSTRUCTIONS -       - MEDICATION INSTRUCTIONS -       sodium chloride 100 mL/hr at 07/28/22 2047       amikacin (AMIKIN) intermittent infusion 250 mL  7.5 mg/kg Intravenous Q12H     aspirin  81 mg Oral Daily     citalopram  40 mg Oral Daily     divalproex sodium extended-release  1,000 mg Oral BID     doxycycline (VIBRAMYCIN) IV  100 mg Intravenous Q12H     heparin ANTICOAGULANT  5,000 Units Subcutaneous Q12H     imipenem-cilastatin (PRIMAXIN) IV  500 mg  Intravenous Q6H     insulin aspart  1-7 Units Subcutaneous TID AC     insulin aspart  1-5 Units Subcutaneous At Bedtime     levETIRAcetam  500 mg Oral BID     QUEtiapine  100 mg Oral At Bedtime     sodium chloride (PF)  3 mL Intracatheter Q8H     sulfamethoxazole-trimethoprim  280 mg Intravenous Q6H       Data   Recent Labs   Lab 07/29/22  0439 07/28/22  2110 07/28/22  1746 07/28/22  0909 07/28/22  0526 07/27/22  0839 07/27/22  0514 07/25/22  1342 07/25/22  1013   WBC 15.0*  --   --   --  17.4*  --  17.6*   < > 18.6*   HGB 8.6*  --   --   --  9.4*  --  9.0*   < > 7.9*   MCV 96  --   --   --  96  --  97   < > 101*   *  --   --   --  113*  --  125*   < > 162   INR  --   --   --   --   --   --   --   --  1.32*     --   --   --  138  --  142   < > 131*   POTASSIUM 3.7  --   --   --  3.9  --  3.7   < > 4.3   CHLORIDE 108  --   --   --  110*  --  112*   < > 98   CO2 20  --   --   --  24  --  25   < > 23   BUN 23  --   --   --  29  --  35*   < > 39*   CR 0.80  --   --   --  0.84  --  0.88   < > 1.35*   ANIONGAP 7  --   --   --  4  --  5   < > 10   IVON 7.4*  --   --   --  7.8*  --  7.9*   < > 8.3*   * 93 118*   < > 119*   < > 142*   < > 199*   ALBUMIN 1.0*  --   --   --  1.1*  --  1.2*   < > 1.6*   PROTTOTAL 4.5*  --   --   --  4.9*  --  5.1*   < > 6.4*   BILITOTAL 0.4  --   --   --  0.7  --  0.6   < > 1.0   ALKPHOS 163*  --   --   --  183*  --  134   < > 192*   ALT 7  --   --   --  7  --  6   < > 9   AST 14  --   --   --  19  --  16   < > 23   LIPASE  --   --   --   --   --   --   --   --  25*    < > = values in this interval not displayed.       Recent Results (from the past 24 hour(s))   CT Head w/o & w Contrast    Narrative    Exam: CT HEAD W/O & W CONTRAST     Exam reason: nocardia bacteremia- ?abscess    Technique:   Axial images of the head obtained without and with the administration  of IV contrast. Coronal and sagittal reformations were generated on  the scanner.    Comparison: 11/13/2014        Findings:      Parenchyma: No evidence of intraparenchymal hemorrhage, mass, or acute  cortical infarct.  No abnormal enhancement or vasogenic edema.     There is a focus of encephalomalacia in the high posterior right  frontal lobe which is unchanged, compatible with a remote infarct.  There is patchy periventricular and deep white matter hypoattenuation  which is nonspecific but most likely due to chronic microvascular  ischemic change.    No midline shift. The basilar cisterns are patent.    Extra-axial spaces: No extra-axial fluid collection or hemorrhage.     Ventricles: Normal.  Paranasal sinuses: There is a small mucous retention cyst in the left  maxillary sinus. Minimal mucosal thickening in the ethmoid sinuses.   Mastoid air cells: Clear.    Osseous: No acute findings.  Orbits: Normal.    Soft tissues: Unremarkable.       Impression    Impression:  No acute intracranial abnormality. No evidence of an intracranial  abscess.        JAYME ADAMS MD         SYSTEM ID:  [id number]

## 2022-07-29 NOTE — PLAN OF CARE
Round of bactrim and primaxin completed, no allergic reaction signs or symptoms, no seizures.

## 2022-07-30 NOTE — PHARMACY-AMINOGLYCOSIDE DOSING SERVICE
Pharmacy Aminoglycoside Follow-Up Note  Date of Service 2022  Patient's  1955   67 year old, male    Weight (Adjusted): 75.9 kg; total body weight of 80.4 kg should have been used Per Traditional Dosing-Aminoglycoside Protocol    Indication: Nocardia Bacteremia  Current Amikacin regimen:  550 mg IV q12h  Day of therapy: 3    Target goals based on conventional dosing  Goal Peak level: 25-30 mg/L  Goal Trough level: <8 mg/L    Current estimated CrCl: Estimated Creatinine Clearance: 86.5 mL/min (based on SCr of 0.93 mg/dL).    Creatinine for last 3 days  2022:  5:26 AM Creatinine 0.84 mg/dL  2022:  4:39 AM Creatinine 0.80 mg/dL  2022: 12:19 AM Creatinine 0.93 mg/dL    Nephrotoxins and other renal medications (From now, onward)    Start     Dose/Rate Route Frequency Ordered Stop    22 0500  norepinephrine (LEVOPHED) 4 mg in  mL infusion PREMIX         0.01-0.3 mcg/kg/min × 79.2 kg  3-89.1 mL/hr  Intravenous CONTINUOUS 22 0449      22 1430  amikacin (AMIKIN) 550 mg in D5W 250 mL intermittent infusion         7.5 mg/kg × 75.9 kg  250 mL/hr over 60 Minutes Intravenous EVERY 12 HOURS 22 1230            Contrast Orders - past 72 hours (72h ago, onward)    Start     Dose/Rate Route Frequency Stop    22 1730  iopamidol (ISOVUE-370) solution 50 mL         50 mL Intravenous ONCE 22 1717          Aminoglycoside Levels - past 2 days  No results found for requested labs within last 48 hours.    Aminoglycosides IV Administrations (past 72 hours)                   amikacin (AMIKIN) 550 mg in D5W 250 mL intermittent infusion (mg) 550 mg New Bag 22 0325     550 mg New Bag 22 1618     550 mg New Bag  0355     550 mg New Bag 22 1551              Pharmacokinetic Analysis  Trough level drawn 22@1530: 8.5 mg/L [goal <8]  Dose hung 22@3237-4035  Peak level drawn 22@1850: 20.2 mg/L [goal 25-30]  Volume of distribution: 0.5  L/kg  Half-life: 2.7 hours        Interpretation of levels and current regimen:  Aminoglycoside levels are slightly outside of goal range    Has serum creatinine changed greater than 50% in the last 72 hours: No    Urine output:  unable to determine    Renal function: Stable    Plan  1. Patient transferred to [facility]; would recommend adjusting dose slightly to 500 mg IV Q12H    2.  Method of evaluation: peak and trough    3. Pharmacy will continue to follow and check levels  as appropriate in 1-3 Days    Henrry Oliver, RPH

## 2022-07-30 NOTE — PROGRESS NOTES
Page sent to Dr. Perales for elevated lactic acid.  Discussed via phone call.  Primary RN to call Dr. Perales back when available.   Critical lab result: Lactic acid 2.6.

## 2022-07-30 NOTE — PHARMACY-MEDICATION REGIMEN REVIEW
Pharmacy Antimicrobial Stewardship Assessment     Current Antimicrobial Therapy:  Anti-infectives (From now, onward)    Start     Dose/Rate Route Frequency Ordered Stop    22 2000  sulfamethoxazole-trimethoprim (BACTRIM) 280 mg in D5W 567.5 mL intermittent infusion         280 mg  567.5 mL/hr over 60 Minutes Intravenous EVERY 6 HOURS 22 1112      22 1430  amikacin (AMIKIN) 550 mg in D5W 250 mL intermittent infusion         7.5 mg/kg × 75.9 kg  250 mL/hr over 60 Minutes Intravenous EVERY 12 HOURS 22 1230      22 1400  imipenem-cilastatin (PRIMAXIN) 500 mg in sodium chloride 0.9 % 100 mL intermittent infusion         500 mg  over 30 Minutes Intravenous EVERY 6 HOURS 22 1117      22 1600  doxycycline (VIBRAMYCIN) 100 mg in sodium chloride 0.9 % 100 mL intermittent infusion         100 mg  over 1-2 Hours Intravenous EVERY 12 HOURS 22 1503            Indication: Nocardia Bacteremia + CAP + Cystitis    Days of Therapy: Day 3 Primaxin + Bactrim, Day 6 Doxycycline     Pertinent Labs:  Recent Labs   Lab Test 22  0426 22  0019 22  0439   WBC 14.7* 15.6* 15.0*     Recent Labs   Lab Test 22  0426 22  0019 22  0439 22  0526 22  0514 22  0816 22  0556 22  1110 22  1013 LACT 2.8* 2.6*  --   --   --    < >  --    < > 5.0* CRP  --  85.2* 84.8* 92.0* 133.0*  --  141.0*  --  180.0* SED  --   --   --   --   --   --   --   --   --  PCAL  --  0.24* 0.50* 0.44* 0.57*  --  0.87*  --  1.17*  < > = values in this interval not displayed.        Temperature:  Temp (24hrs), Av.9  F (36.6  C), Min:97  F (36.1  C), Max:99.1  F (37.3  C)    Culture Results:   (22) Blood  (22) Blood  (22) Urine =     (22) Blood = Nocardia cyriacigeorgica- Sent to [facility] for identification and susceptibility testing. Identification is preliminary, confirmation in progress.  (22) COVID/Influenza/RSV =  negative    Recommendations/Interventions:  1. Following ID recommendations for antibiotic therapy for Nocardia  2. VPA level with AM labs d/t interaction with Primaxin    Henrry Oliver, [facility]  July 30, 2022

## 2022-07-30 NOTE — DISCHARGE SUMMARY
[facility]    Discharge/Transfer  Summary  Hospitalist    Date of Admission:  7/25/2022  Date of Discharge:  7/30/2022  Discharging Provider: Astrid Banuelos CNP  Date of Service (when I saw the patient): 07/30/22    Discharge Diagnoses     Principal Problem:    Nocardia Bacteremia  Active Problems:    Pneumonia, community acquired    Acute encephalopathy    CLL (chronic lymphocytic leukemia) (H)    Bipolar disorder, unspecified (H)    Hemolytic anemia associated with lymphoproliferative disorder (H)    Sepsis, due to unspecified organism, unspecified whether acute organ dysfunction present (H)    Acute cystitis without hematuria    Essential hypertension    Seizure disorder (H)    Diabetes mellitus, type II (H)      History of Present Illness   From admission: The patient is a 67-year-old gentleman who has a significant history of chronic lymphocytic leukemia, small lymphocytic lymphoma.  This has been present since at least 2012.  He has currently been on ibrutinib.  He has history of diabetes, seizure disorder, underlying COPD also.  He apparently presented to the ER back on 07/05/2022 with some complaints of generalized weakness then, he did have a cough.  No real shortness of breath at all.  Workup at that time showed that he had normal room air sats, was hemodynamically stable, afebrile.  Labs were significant.  CRP was up at 90.2.  White count 14,000.  COVID is negative.  Urine was clear.  Chest x-ray showed questionable multifocal pneumonia.  He was started on a Z-Car and given a short course of prednisone.  Apparently, he was brought back to the ER today as he was supposed to have an eye procedure done; however, he just felt poor.  He has not been eating or drinking well, has been more lethargic, has worsening cough, really seemingly has not gotten better since that trip to the ER back on 07/05.  He did see Dr. Unger for a preop on 07/19/2022.  Not much is said about his recent issues.  His  sats were 95% on room air, pressure was 114/62 and he was basically cleared for surgery.  He came in today, brought by his daughter just because he was not feeling well at all, and was brought to the ER for evaluation.  His vital signs when he showed up in the ER were temperature of 98.2, blood pressure was 73/50, pulse 114.  Saturations are 96% on room air.  He was given some IV fluids.  He had a lot of workup done in the ER, which showed CRP was up to 180.  Ammonia was negative.  Procalcitonin is 1.17.  Lactic acid was 2.5.  White count was 18,000, hemoglobin was 7.  Urinalysis did have a 60 white cells and 3 red cells, white blood cell clumps, bacteria were noted.  His COVID was negative.  Alcohol was negative.  CT scan of his chest, which showed multifocal nodular opacities throughout both lungs.  Whether it is an atypical infectious/inflammatory process or metastatic disease is unclear.  He had blood cultures done.  Urine culture was done.  He was given a dose of cefepime based on his urinalysis.  The plan was to be admitted here to our hospital for further evaluation and workup.     As I am seeing the patient down in the ER, he was very lethargic.  Difficult to understand him, but he did answer questions seemingly appropriately.  He just feels very fatigued and cold.  He has had the cough.  No real shortness of breath or chest pain.  Not much of an appetite.  No nausea, vomiting.  No diarrhea, no abdominal pain.  Normally, he is able to get up and move around on his own pretty well, just feels pretty wiped out, however.  He does have a very congested sounding cough currently.  His most recent vital signs were temperature of 96.6, blood pressure 105/67, pulse is 87, sats are 95% on room air.  Looking at the notes, I believe he got roughly 2 liters of saline IV, did get 1 dose of Maxipime, cefepime 2 grams in the ER also.    Hospital Course   Pneumonia, community acquired: Probable nocardia though can not rule  out multiple organisims with his immunosuppression. Will continue doxycycline but stop rocephin and treat for nocardia.        Bacteremia,nocardia: Aerobic bottle x2 sets from admission cultures positive overnight. Sensitivities are pending. Spoke with Dr. Moncada, preference is for transfer however there are no beds in the state available. Move to ICU. Due to unknown sulfa allergy will do 10% test dose then start 15mg/kg/day every 6 hours. Suspect both pulmonary and CNS involvement with new encephalopathy that family noted started rather abruptly prior to arrival. Will need imipenem-seizure precautions with rescue meds at bedside. Also starting amikacin. Get another set of BC today. CT head to look for obvious signs of infection.   -7/29: No brain abscess on CT scan, tolerating Bactrim without any reactions, no seizure activity overnight. Continue above plan.   -7/30: Bed availabe at [facility] and patient is accepted by Dr. Cota.        Acute encephalopathy: Suspect CNS infection with nocardia. Treat as above. He is stable from presentation, fatigued and sleeps unless aroused. CT today, if negative may also need MRI.   -7/29: No change, still very sleepy but arousable.   -7/30: Some improvement in overall somnolence, remains oriented to self only.        CLL (chronic lymphocytic leukemia) with leukocytosis(H): Chronic, followed by Dr. Sarah. As an outpatient has been on ibrutinib.       Bipolar disorder, unspecified (H): Neuro changes more difficult to discern, currenty only on celexa and seroquel at home.        Hemolytic anemia associated with lymphoproliferative disorder (H): Hemoglobin < 7, after speaking with Dr. Sarah he did get 2 untis PRBC's transfused 7/27 and now running >9 hgb.        Sepsis, due to unspecified organism, unspecified whether acute organ dysfunction present (H): Due to nocardia bacteremia, possibly separate bacterial pneumonia and UTI with culture still pending. Treating with  antibiotics as above, continue IVF as he is taking minimal oral fluids.   -7/30: Soft blood pressures overnight-drifting into 80/60's with one reading 76/52. However. Blood pressures improved after essentially 500cc bolus of IVF with bactrim dose and levophed was not required. He does have elevated lactic acid. Give 500ml bolus then return IVF to 125ml/hr.       Acute cystitis without hematuria: Gram positive colonies on cultures, ID still pending. Hopefully will be covered with bactrim and doxycycline.  -7/29: Nocardia growing in urine as well.         Essential hypertension: Blood pressures have been stable since arrival, no pressors needed.        Seizure disorder (H): Imipenem required for life threatening nocardia infection, however it will potentially bind his valproic acid so he has increased risk of seizures. He is on seizure precautions and will have emergency intervention at bedside. If he does develop seizures will bolus keppra.        Diabetes mellitus, type II (H): QID glucose with sliding scale. His sugars have been pretty stable and he has not required any insulin coverage.       Astrid Banuelos CNP      Pending Results     Unresulted Labs Ordered in the Past 30 Days of this Admission     Date and Time Order Name Status Description    7/30/2022  4:49 AM Blood Culture Arm, Right In process     7/30/2022  4:49 AM Blood Culture Arm, Right In process     7/29/2022  5:33 PM Amikacin level In process     7/29/2022 12:31 PM Amikacin level In process     7/28/2022  7:31 AM Blood Culture Hand, Left Preliminary     7/28/2022  7:31 AM Blood Culture Arm, Right Preliminary     7/25/2022 10:58 AM Blood Culture Arm, Left Preliminary           Code Status   DNR / DNI       Primary Care Physician   Alexi Unger     Discharge Disposition   Transferred to [facility] at discharge: Stable    Consultations This Hospital Stay   [facility]  PHYSICAL THERAPY ADULT IP CONSULT  OCCUPATIONAL THERAPY  ADULT IP CONSULT  PHARMACY IP CONSULT    Time Spent on this Encounter   Astrid MCNAMARA NP, personally saw the patient today and spent greater than 30 minutes discharging this patient.    No medications prior to admission.     No current facility-administered medications for this encounter.     Current Outpatient Medications   Medication     albuterol (PROVENTIL) (2.5 MG/3ML) 0.083% neb solution     aspirin (ASA) 81 MG EC tablet     citalopram (CELEXA) 40 MG tablet     divalproex sodium extended-release (DEPAKOTE ER) 500 MG 24 hr tablet     ibrutinib (IMBRUVICA) 420 MG tablet     levETIRAcetam (KEPPRA) 500 MG tablet     Multiple Vitamins-Minerals (PRESERVISION AREDS PO)     pioglitazone (ACTOS) 15 MG tablet     prochlorperazine (COMPAZINE) 10 MG tablet     QUEtiapine (SEROQUEL) 100 MG tablet     simvastatin (ZOCOR) 20 MG tablet             Allergies   Allergies   Allergen Reactions     Blood Transfusion Related (Informational Only) Other (See Comments)     Patient has a history of a warm autoantibody.  A delay in compatible RBCs may occur.     Sulfa Drugs      Tolerated IV Bactrim (7/2022)     Data   Most Recent 3 CBC's:Recent Labs   Lab Test 07/30/22  0426 07/30/22  0019 07/29/22  0439   WBC 14.7* 15.6* 15.0*   HGB 8.1* 8.5* 8.6*   MCV 96 96 96   PLT 85* 84* 102*      Most Recent 3 BMP's:  Recent Labs   Lab Test 07/30/22  0822 07/30/22  0019 07/29/22  2155 07/29/22  1210 07/29/22  0439 07/28/22  0909 07/28/22  0526   NA  --  138  --   --  135  --  138   POTASSIUM  --  3.6  --   --  3.7  --  3.9   CHLORIDE  --  110*  --   --  108  --  110*   CO2  --  20  --   --  20  --  24   BUN  --  18  --   --  23  --  29   CR  --  0.93  --   --  0.80  --  0.84   ANIONGAP  --  8  --   --  7  --  4   IVON  --  7.5*  --   --  7.4*  --  7.8*   GLC 73 110* 103*   < > 169*   < > 119*    < > = values in this interval not displayed.     Most Recent 2 LFT's:  Recent Labs   Lab Test 07/30/22  0019 07/29/22  0439   AST 15 14   ALT <6  7   ALKPHOS 151* 163*   BILITOTAL 0.4 0.4     Most Recent INR's and Anticoagulation Dosing History:  Anticoagulation Dose History     Recent Dosing and Labs Latest Ref Rng & Units 11/13/2014 8/16/2015 9/7/2015 10/29/2015 12/17/2020 7/25/2022    INR 0.85 - 1.15 1.04 1.19 1.20 1.00 1.27(H) 1.32(H)        Most Recent 3 Troponin's:  Recent Labs   Lab Test 10/29/15  2030 09/07/15  2315 08/31/15  1443   TROPI 0.028 <0.015  The 99th percentile for upper reference range is 0.045 ug/L.  Troponin values in   the range of 0.045 - 0.120 ug/L may be associated with risks of adverse   clinical events.   <0.015  The 99th percentile for upper reference range is 0.045 ug/L.  Troponin values in   the range of 0.045 - 0.120 ug/L may be associated with risks of adverse   clinical events.       Most Recent Cholesterol Panel:No lab results found.  Most Recent 6 Bacteria Isolates From Any Culture (See EPIC Reports for Culture Details):  Recent Labs   Lab Test 12/17/20  1252 12/16/20  0745 12/13/20  1250 12/13/20  1245 10/29/15  2150 10/29/15  2125   CULT No anaerobes isolated  No growth Light growth  Normal skin kamari  No further identification or sensitivity done   No growth after 6 days No growth after 6 days No growth after 6 days No growth after 6 days     Most Recent TSH, T4 and A1c Labs:  Recent Labs   Lab Test 07/25/22  1013 09/07/15  2315 06/04/14  0545   TSH 3.06   < >  --    A1C  --   --  6.1    < > = values in this interval not displayed.     Results for orders placed or performed during the hospital encounter of 07/25/22   XR Chest Port 1 View    Narrative    PROCEDURE:  XR CHEST PORT 1 VIEW    HISTORY:  Dyana evans.     COMPARISON:  July 5, 2001    FINDINGS:   The cardiac silhouette is normal in size. The pulmonary vasculature is  normal.  There is mild interstitial thickening in both lungs stable  from previous examination No pleural effusion or pneumothorax.      Impression    IMPRESSION:  Stable appearance of the chest  from July 5, 2022      CECIL CASTRO MD         SYSTEM ID:  [id number]   CTA Chest with Contrast    Narrative    CTA CHEST WITH CONTRAST    HISTORY: 67 years Male cancer, HoTN, shortness of breath, malaise.    TECHNIQUE: Axial CT imaging of the chest was performed with  intervenous contrast during arterial phase of enhancement. Multiplanar  reconstructions and 3-D MIP reconstructions were obtained on a 3-D  workstation.    COMPARISON: 12/16/2020    FINDINGS:  There is a small to moderate-sized pericardial effusion. Volume of  pericardial fluid is similar to that seen previously. There has been  interval resolution of a large loculated left pleural effusion.    There are no filling defects in the pulmonary arteries. There is no  evidence of pulmonary embolism. There is a peripherally calcified  structure below the arch of the aorta, above the left pulmonary artery  that does not  opacify with contrast.  Numerous mildly enlarged mediastinal lymph nodes are present, similar  to those seen previously.    There is been interval development of numerous pulmonary nodules or  nodular opacities. Ranging in size from a few millimeters to 1.2 cm in  diameter. The majority of these opacities have irregular margins, some  with groundglass halos. There is some peripheral interstitial opacity  as well.         No concerning osseous lesions are identified    There is slight enlargement of the left adrenal gland with some  adjacent fat stranding. The adrenal measures 4.0 x 1.6 cm. Previously  it measured 3.5 x 1.2 cm.      Impression    IMPRESSION: No evidence of pulmonary embolism.    Moderate sized pericardial effusion. A similar volume of pericardial  fluid was present on the prior study. There is been interval  resolution of a large loculated left pleural effusion or    Interval of development of numerous multifocal nodular opacities  throughout both lungs. Differential would include an atypical  infectious/inflammatory process  or metastatic disease. There is  unchanged borderline mediastinal lymphadenopathy. There is slight  enlargement of the left adrenal gland.    Interstitial opacities suggestive of pulmonary edema.    EVIE SALAZAR MD         SYSTEM ID:  [id number]   CT Head w/o & w Contrast    Narrative    Exam: CT HEAD W/O & W CONTRAST     Exam reason: nocardia bacteremia- ?abscess    Technique:   Axial images of the head obtained without and with the administration  of IV contrast. Coronal and sagittal reformations were generated on  the scanner.    Comparison: 11/13/2014       Findings:      Parenchyma: No evidence of intraparenchymal hemorrhage, mass, or acute  cortical infarct.  No abnormal enhancement or vasogenic edema.     There is a focus of encephalomalacia in the high posterior right  frontal lobe which is unchanged, compatible with a remote infarct.  There is patchy periventricular and deep white matter hypoattenuation  which is nonspecific but most likely due to chronic microvascular  ischemic change.    No midline shift. The basilar cisterns are patent.    Extra-axial spaces: No extra-axial fluid collection or hemorrhage.     Ventricles: Normal.  Paranasal sinuses: There is a small mucous retention cyst in the left  maxillary sinus. Minimal mucosal thickening in the ethmoid sinuses.   Mastoid air cells: Clear.    Osseous: No acute findings.  Orbits: Normal.    Soft tissues: Unremarkable.       Impression    Impression:  No acute intracranial abnormality. No evidence of an intracranial  abscess.        JAYME ADAMS MD         SYSTEM ID:  [id number]

## 2022-07-30 NOTE — PLAN OF CARE
Pt is alert but does become lethargic. Denies pain but does grimace and moan with repositioning. HR has been SR. BP have been soft but maintaining a MAP above 65. Maintaining oxygen saturation on RA. Incontinent and continent at times requesting urinal. No BM this shift. IV in right lower forearm is infusing. Left upper arm IV is saline locked. Seizure precautions in place. Received all schedule medications before discharge. Nurse to Nurse reports given to [facility] ICU nurse. Two daughters here at time of transfer.

## 2022-07-30 NOTE — PLAN OF CARE
Patient is alert and oriented to self. Resting on and off throughout shift. Patients BP remain low, levophed drip ordered but holding off starting until central line placement. Patient very edematous throughout limbs. No reaction noted to antibiotics, no seizure activity noted. Patient given PO medications in pudding, some coughing noted following. Patient incontinent, changed Q2H. Assessments as charted.    Face to face report given with opportunity to observe patient.    Report given to ARIAS Huggins RN   7/30/2022  7:04 AM

## 2022-07-30 NOTE — PLAN OF CARE
Notified pt daughter Hipolito of pt being transferred to [facility]

## 2022-07-30 NOTE — SIGNIFICANT EVENT
Significant Event Note    Time of event: 12:37 AM July 30, 2022    Description of event:  Patient was noted with hypotension and sepsis warning fired.  Lactic acid was checked and it was elevated at 2.6.    Plan:  Will check inflammatory markers, procalcitonin and CRP to evaluate for worsening infection status.  Patient's a BNP was checked and it was highly elevated suggesting that patient may have a component of a congestive heart failure.  Patient indeed has diffuse peripheral edema.  We will hold off on giving him extra fluid at this time.  If patient's lactic acid does not improve, we will consider starting pressor IV drip.  Patient is on Bactrim, imipenem and doxycycline; will not change antibiotics at this time as these antibiotics should provide broad-spectrum coverage  Patient has thrombocytopenia is also worsening; will hold heparin subcu for now.    Discussed with: bedside nurse    Gianni Perales MD

## 2022-07-30 NOTE — PLAN OF CARE
Physical Therapy Discharge Summary    Reason for therapy discharge:    Discharged to [facility]  Change in medical status.    Progress towards therapy goal(s). See goals on Care Plan in Epic electronic health record for goal details.  Goals not met.  Barriers to achieving goals:   discharge from facility.    Therapy recommendation(s):    TBD based on accepting facility    Goal Outcome Evaluation:

## 2022-08-02 NOTE — PLAN OF CARE
Accessed patient chart to review personal belongings that were left at facility.  Daughter called and will come  pt belongings.

## 2022-09-04 ENCOUNTER — HEALTH MAINTENANCE LETTER (OUTPATIENT)
Age: 67
End: 2022-09-04